# Patient Record
Sex: MALE | Race: ASIAN | NOT HISPANIC OR LATINO | Employment: FULL TIME | URBAN - METROPOLITAN AREA
[De-identification: names, ages, dates, MRNs, and addresses within clinical notes are randomized per-mention and may not be internally consistent; named-entity substitution may affect disease eponyms.]

---

## 2019-02-21 ENCOUNTER — OFFICE VISIT (OUTPATIENT)
Dept: INTERNAL MEDICINE | Facility: CLINIC | Age: 54
End: 2019-02-21
Payer: COMMERCIAL

## 2019-02-21 VITALS
DIASTOLIC BLOOD PRESSURE: 73 MMHG | HEIGHT: 66 IN | WEIGHT: 177.94 LBS | BODY MASS INDEX: 28.6 KG/M2 | HEART RATE: 86 BPM | SYSTOLIC BLOOD PRESSURE: 107 MMHG | TEMPERATURE: 98 F

## 2019-02-21 DIAGNOSIS — Z12.5 PROSTATE CANCER SCREENING: ICD-10-CM

## 2019-02-21 DIAGNOSIS — L40.9 PSORIASIS: ICD-10-CM

## 2019-02-21 DIAGNOSIS — Z23 NEED FOR 23-POLYVALENT PNEUMOCOCCAL POLYSACCHARIDE VACCINE: ICD-10-CM

## 2019-02-21 DIAGNOSIS — Z12.11 COLON CANCER SCREENING: ICD-10-CM

## 2019-02-21 DIAGNOSIS — Z11.59 NEED FOR HEPATITIS C SCREENING TEST: ICD-10-CM

## 2019-02-21 DIAGNOSIS — Z23 NEED FOR IMMUNIZATION AGAINST VACCINIA VIRUS INFECTION: ICD-10-CM

## 2019-02-21 DIAGNOSIS — Z00.00 WELL ADULT EXAM: Primary | ICD-10-CM

## 2019-02-21 DIAGNOSIS — F17.200 SMOKER: ICD-10-CM

## 2019-02-21 DIAGNOSIS — Z83.3 FAMILY HISTORY OF DIABETES MELLITUS: ICD-10-CM

## 2019-02-21 PROCEDURE — 90732 PNEUMOCOCCAL POLYSACCHARIDE VACCINE 23-VALENT =>2YO SQ IM: ICD-10-PCS | Mod: S$GLB,,, | Performed by: FAMILY MEDICINE

## 2019-02-21 PROCEDURE — 99999 PR PBB SHADOW E&M-NEW PATIENT-LVL III: CPT | Mod: PBBFAC,,, | Performed by: FAMILY MEDICINE

## 2019-02-21 PROCEDURE — 90686 FLU VACCINE (QUAD) GREATER THAN OR EQUAL TO 3YO PRESERVATIVE FREE IM: ICD-10-PCS | Mod: S$GLB,,, | Performed by: FAMILY MEDICINE

## 2019-02-21 PROCEDURE — 90686 IIV4 VACC NO PRSV 0.5 ML IM: CPT | Mod: S$GLB,,, | Performed by: FAMILY MEDICINE

## 2019-02-21 PROCEDURE — 90472 IMMUNIZATION ADMIN EACH ADD: CPT | Mod: S$GLB,,, | Performed by: FAMILY MEDICINE

## 2019-02-21 PROCEDURE — 99386 PREV VISIT NEW AGE 40-64: CPT | Mod: 25,S$GLB,, | Performed by: FAMILY MEDICINE

## 2019-02-21 PROCEDURE — 90471 IMMUNIZATION ADMIN: CPT | Mod: S$GLB,,, | Performed by: FAMILY MEDICINE

## 2019-02-21 PROCEDURE — 99999 PR PBB SHADOW E&M-NEW PATIENT-LVL III: ICD-10-PCS | Mod: PBBFAC,,, | Performed by: FAMILY MEDICINE

## 2019-02-21 PROCEDURE — 90472 PNEUMOCOCCAL POLYSACCHARIDE VACCINE 23-VALENT =>2YO SQ IM: ICD-10-PCS | Mod: S$GLB,,, | Performed by: FAMILY MEDICINE

## 2019-02-21 PROCEDURE — 99386 PR PREVENTIVE VISIT,NEW,40-64: ICD-10-PCS | Mod: 25,S$GLB,, | Performed by: FAMILY MEDICINE

## 2019-02-21 PROCEDURE — 90471 FLU VACCINE (QUAD) GREATER THAN OR EQUAL TO 3YO PRESERVATIVE FREE IM: ICD-10-PCS | Mod: S$GLB,,, | Performed by: FAMILY MEDICINE

## 2019-02-21 PROCEDURE — 90732 PPSV23 VACC 2 YRS+ SUBQ/IM: CPT | Mod: S$GLB,,, | Performed by: FAMILY MEDICINE

## 2019-02-21 RX ORDER — IBUPROFEN 800 MG/1
TABLET ORAL
COMMUNITY
Start: 2019-02-20 | End: 2022-06-10

## 2019-02-21 RX ORDER — AMOXICILLIN 500 MG/1
TABLET, FILM COATED ORAL
COMMUNITY
Start: 2019-02-20 | End: 2020-04-08

## 2019-02-21 RX ORDER — HYDROCODONE BITARTRATE AND ACETAMINOPHEN 7.5; 325 MG/1; MG/1
TABLET ORAL
COMMUNITY
Start: 2019-02-20 | End: 2020-04-08

## 2019-02-21 NOTE — PROGRESS NOTES
Subjective:       Patient ID: Jerry Turner is a 53 y.o. male.    Chief Complaint: Annual Exam and Establish Care    HPI 53-year-old German male presents to clinic today to establish care.  He has been followed by Dermatology in the past but has been inconsistent with follow-up for treatment of psoriasis.  At this time he reports using over-the-counter moisturizers without relief.  He reports a past surgical history of dental bone graft.  He reports a family history of his mother having breast cancer and diabetes.  He is not up-to-date with pneumonia vaccine or flu vaccine.  Both have been discussed and will be given today.  Colonoscopy has been discussed and will be scheduled.  Review of Systems   Constitutional: Negative for appetite change, chills, fatigue and fever.   HENT: Negative for congestion, ear pain, hearing loss, postnasal drip, rhinorrhea, sinus pressure, sore throat and tinnitus.    Eyes: Negative for redness, itching and visual disturbance.   Respiratory: Negative for cough, chest tightness and shortness of breath.    Cardiovascular: Negative for chest pain and palpitations.   Gastrointestinal: Negative for abdominal pain, constipation, diarrhea, nausea and vomiting.   Genitourinary: Negative for decreased urine volume, difficulty urinating, dysuria, frequency, hematuria and urgency.   Musculoskeletal: Negative for back pain, myalgias, neck pain and neck stiffness.   Skin: Negative for rash.   Neurological: Negative for dizziness, light-headedness and headaches.   Psychiatric/Behavioral: Negative.        Objective:      Physical Exam   Constitutional: He is oriented to person, place, and time. He appears well-developed and well-nourished. No distress.   HENT:   Head: Normocephalic and atraumatic.   Right Ear: External ear normal.   Left Ear: External ear normal.   Nose: Nose normal.   Mouth/Throat: Oropharynx is clear and moist. No oropharyngeal exudate.   Eyes: Conjunctivae and EOM are normal.  Pupils are equal, round, and reactive to light. Right eye exhibits no discharge. Left eye exhibits no discharge. No scleral icterus.   Neck: Normal range of motion. Neck supple. No JVD present. No tracheal deviation present. No thyromegaly present.   Cardiovascular: Normal rate, regular rhythm, normal heart sounds and intact distal pulses. Exam reveals no gallop and no friction rub.   No murmur heard.  Pulmonary/Chest: Effort normal and breath sounds normal. No stridor. No respiratory distress. He has no wheezes. He has no rales.   Abdominal: Soft. Bowel sounds are normal. He exhibits no distension and no mass. There is no tenderness. There is no rebound and no guarding.   Musculoskeletal: Normal range of motion. He exhibits no edema or tenderness.   Lymphadenopathy:     He has no cervical adenopathy.   Neurological: He is alert and oriented to person, place, and time.   Skin: Skin is warm and dry. No rash noted. He is not diaphoretic. No erythema. No pallor.   Psoriatic plaques to the bilateral arms   Psychiatric: He has a normal mood and affect. His behavior is normal. Judgment and thought content normal.   Nursing note and vitals reviewed.      Assessment:       1. Well adult exam    2. Psoriasis    3. Family history of diabetes mellitus    4. Need for immunization against vaccinia virus infection    5. Need for 23-polyvalent pneumococcal polysaccharide vaccine    6. Colon cancer screening    7. Need for hepatitis C screening test    8. Prostate cancer screening    9. Smoker        Plan:     1.  CBC, CMP, UA, TSH, free T4, fasting lipids, vitamin-D level, hemoglobin A1c, PSA, and hepatitis-C antibody screen.  2.  Refer to Dermatology for further evaluation and treatment of psoriasis.  3.  Pneumococcal vaccine given.  4.  Flu vaccine given.  5.  Encourage smoking cessation.  6.  Return to clinic as needed or in 1 year for annual exam.

## 2019-02-26 ENCOUNTER — LAB VISIT (OUTPATIENT)
Dept: LAB | Facility: HOSPITAL | Age: 54
End: 2019-02-26
Attending: FAMILY MEDICINE
Payer: COMMERCIAL

## 2019-02-26 DIAGNOSIS — Z00.00 WELL ADULT EXAM: ICD-10-CM

## 2019-02-26 DIAGNOSIS — Z11.59 NEED FOR HEPATITIS C SCREENING TEST: ICD-10-CM

## 2019-02-26 DIAGNOSIS — Z12.5 PROSTATE CANCER SCREENING: ICD-10-CM

## 2019-02-26 LAB
25(OH)D3+25(OH)D2 SERPL-MCNC: 22 NG/ML
ALBUMIN SERPL BCP-MCNC: 3.8 G/DL
ALP SERPL-CCNC: 58 U/L
ALT SERPL W/O P-5'-P-CCNC: 21 U/L
ANION GAP SERPL CALC-SCNC: 7 MMOL/L
AST SERPL-CCNC: 23 U/L
BASOPHILS # BLD AUTO: 0.06 K/UL
BASOPHILS NFR BLD: 1 %
BILIRUB SERPL-MCNC: 0.5 MG/DL
BUN SERPL-MCNC: 16 MG/DL
CALCIUM SERPL-MCNC: 9.3 MG/DL
CHLORIDE SERPL-SCNC: 104 MMOL/L
CHOLEST SERPL-MCNC: 204 MG/DL
CHOLEST/HDLC SERPL: 4.5 {RATIO}
CO2 SERPL-SCNC: 29 MMOL/L
COMPLEXED PSA SERPL-MCNC: 0.5 NG/ML
CREAT SERPL-MCNC: 1 MG/DL
DIFFERENTIAL METHOD: ABNORMAL
EOSINOPHIL # BLD AUTO: 0.5 K/UL
EOSINOPHIL NFR BLD: 7.8 %
ERYTHROCYTE [DISTWIDTH] IN BLOOD BY AUTOMATED COUNT: 13.2 %
EST. GFR  (AFRICAN AMERICAN): >60 ML/MIN/1.73 M^2
EST. GFR  (NON AFRICAN AMERICAN): >60 ML/MIN/1.73 M^2
ESTIMATED AVG GLUCOSE: 123 MG/DL
GLUCOSE SERPL-MCNC: 103 MG/DL
HBA1C MFR BLD HPLC: 5.9 %
HCT VFR BLD AUTO: 42.5 %
HCV AB SERPL QL IA: NEGATIVE
HDLC SERPL-MCNC: 45 MG/DL
HDLC SERPL: 22.1 %
HGB BLD-MCNC: 13.8 G/DL
IMM GRANULOCYTES # BLD AUTO: 0.03 K/UL
IMM GRANULOCYTES NFR BLD AUTO: 0.5 %
LDLC SERPL CALC-MCNC: 128 MG/DL
LYMPHOCYTES # BLD AUTO: 2.1 K/UL
LYMPHOCYTES NFR BLD: 33.7 %
MCH RBC QN AUTO: 32.5 PG
MCHC RBC AUTO-ENTMCNC: 32.5 G/DL
MCV RBC AUTO: 100 FL
MONOCYTES # BLD AUTO: 0.6 K/UL
MONOCYTES NFR BLD: 10 %
NEUTROPHILS # BLD AUTO: 3 K/UL
NEUTROPHILS NFR BLD: 47 %
NONHDLC SERPL-MCNC: 159 MG/DL
NRBC BLD-RTO: 0 /100 WBC
PLATELET # BLD AUTO: 242 K/UL
PMV BLD AUTO: 11.1 FL
POTASSIUM SERPL-SCNC: 4.5 MMOL/L
PROT SERPL-MCNC: 6.5 G/DL
RBC # BLD AUTO: 4.25 M/UL
SODIUM SERPL-SCNC: 140 MMOL/L
T4 FREE SERPL-MCNC: 1.08 NG/DL
TRIGL SERPL-MCNC: 155 MG/DL
TSH SERPL DL<=0.005 MIU/L-ACNC: 2.03 UIU/ML
WBC # BLD AUTO: 6.3 K/UL

## 2019-02-26 PROCEDURE — 84443 ASSAY THYROID STIM HORMONE: CPT

## 2019-02-26 PROCEDURE — 80053 COMPREHEN METABOLIC PANEL: CPT

## 2019-02-26 PROCEDURE — 86803 HEPATITIS C AB TEST: CPT

## 2019-02-26 PROCEDURE — 80061 LIPID PANEL: CPT

## 2019-02-26 PROCEDURE — 36415 COLL VENOUS BLD VENIPUNCTURE: CPT | Mod: PO

## 2019-02-26 PROCEDURE — 84439 ASSAY OF FREE THYROXINE: CPT

## 2019-02-26 PROCEDURE — 85025 COMPLETE CBC W/AUTO DIFF WBC: CPT

## 2019-02-26 PROCEDURE — 83036 HEMOGLOBIN GLYCOSYLATED A1C: CPT

## 2019-02-26 PROCEDURE — 82306 VITAMIN D 25 HYDROXY: CPT

## 2019-02-26 PROCEDURE — 84153 ASSAY OF PSA TOTAL: CPT

## 2019-02-27 ENCOUNTER — TELEPHONE (OUTPATIENT)
Dept: INTERNAL MEDICINE | Facility: CLINIC | Age: 54
End: 2019-02-27

## 2019-02-28 ENCOUNTER — LAB VISIT (OUTPATIENT)
Dept: LAB | Facility: HOSPITAL | Age: 54
End: 2019-02-28
Attending: DERMATOLOGY
Payer: COMMERCIAL

## 2019-02-28 ENCOUNTER — INITIAL CONSULT (OUTPATIENT)
Dept: DERMATOLOGY | Facility: CLINIC | Age: 54
End: 2019-02-28
Payer: COMMERCIAL

## 2019-02-28 DIAGNOSIS — D17.21 BENIGN LIPOMATOUS NEOPLASM OF SKIN, SUBCU OF RIGHT ARM: ICD-10-CM

## 2019-02-28 DIAGNOSIS — L40.9 PSORIASIS: Primary | ICD-10-CM

## 2019-02-28 DIAGNOSIS — L40.9 PSORIASIS: ICD-10-CM

## 2019-02-28 DIAGNOSIS — L81.4 LENTIGO: ICD-10-CM

## 2019-02-28 PROCEDURE — 99203 OFFICE O/P NEW LOW 30 MIN: CPT | Mod: S$GLB,,, | Performed by: DERMATOLOGY

## 2019-02-28 PROCEDURE — 36415 COLL VENOUS BLD VENIPUNCTURE: CPT

## 2019-02-28 PROCEDURE — 87340 HEPATITIS B SURFACE AG IA: CPT

## 2019-02-28 PROCEDURE — 99999 PR PBB SHADOW E&M-EST. PATIENT-LVL II: ICD-10-PCS | Mod: PBBFAC,,, | Performed by: DERMATOLOGY

## 2019-02-28 PROCEDURE — 99999 PR PBB SHADOW E&M-EST. PATIENT-LVL II: CPT | Mod: PBBFAC,,, | Performed by: DERMATOLOGY

## 2019-02-28 PROCEDURE — 86706 HEP B SURFACE ANTIBODY: CPT

## 2019-02-28 PROCEDURE — 99203 PR OFFICE/OUTPT VISIT, NEW, LEVL III, 30-44 MIN: ICD-10-PCS | Mod: S$GLB,,, | Performed by: DERMATOLOGY

## 2019-02-28 NOTE — LETTER
February 28, 2019      Robert Babin MD  [address] [facility] [place] [address]           [facility]  [address]  [place] [address]  Phone: [phone]  Fax: [phone]          Patient: Jerry Turner   MR Number: [id number]   YOB: 1965   Date of Visit: 2/28/2019       Dear Dr. Robert Babin:    Thank you for referring Jerry Turner to me for evaluation. Attached you will find relevant portions of my assessment and plan of care.    If you have questions, please do not hesitate to call me. I look forward to following Jerry Turner along with you.    Sincerely,    Krystal Guerra MD    Enclosure  CC:  No Recipients    If you would like to receive this communication electronically, please contact [email or web address] or [phone] to request more information on [employer] Link access.    For providers and/or their staff who would like to refer a patient to [facility], please contact us through our one-stop-shop provider referral line, [facility], at [phone].    If you feel you have received this communication in error or would no longer like to receive these types of communications, please e-mail [email or web address]

## 2019-02-28 NOTE — PROGRESS NOTES
"  Subjective:       Patient ID:  Jerry Turner is a 53 y.o. male who presents for   Chief Complaint   Patient presents with    Psoriasis     Psoriasis  - Initial  Affected locations: diffuse  Duration: 10 years  Signs / symptoms: itching  Severity: moderate to severe  Timing: constant  Aggravated by: nothing  Treatments tried: seen by derm in [place] last year -- treated with "light tx" and pills in a pack. also used TAC cream/oint.  Improvement on treatment: no relief        Review of Systems   Constitutional: Negative for fever.   HENT: Negative for sore throat.    Gastrointestinal: Negative for diarrhea.   Musculoskeletal: Positive for arthralgias (fingers/hands).   Skin: Positive for itching and rash.        Objective:    Physical Exam   Constitutional: He appears well-developed and well-nourished. No distress.   Genitourinary:         Neurological: He is alert and oriented to person, place, and time. He is not disoriented.   Psychiatric: He has a normal mood and affect.   Skin:   Areas Examined (abnormalities noted in diagram):   Scalp / Hair Palpated and Inspected  Head / Face Inspection Performed  Neck Inspection Performed  Chest / Axilla Inspection Performed  Abdomen Inspection Performed  Genitals / Buttocks / Groin Inspection Performed  Back Inspection Performed  RUE Inspected  LUE Inspection Performed  RLE Inspected  LLE Inspection Performed  Nails and Digits Inspection Performed              Diagram Legend     Erythematous scaling macule/papule c/w actinic keratosis       Vascular papule c/w angioma      Pigmented verrucoid papule/plaque c/w seborrheic keratosis      Yellow umbilicated papule c/w sebaceous hyperplasia      Irregularly shaped tan macule c/w lentigo     1-2 mm smooth white papules consistent with Milia      Movable subcutaneous cyst with punctum c/w epidermal inclusion cyst      Subcutaneous movable cyst c/w pilar cyst      Firm pink to brown papule c/w dermatofibroma      Pedunculated fleshy " papule(s) c/w skin tag(s)      Evenly pigmented macule c/w junctional nevus     Mildly variegated pigmented, slightly irregular-bordered macule c/w mildly atypical nevus      Flesh colored to evenly pigmented papule c/w intradermal nevus       Pink pearly papule/plaque c/w basal cell carcinoma      Erythematous hyperkeratotic cursted plaque c/w SCC      Surgical scar with no sign of skin cancer recurrence      Open and closed comedones      Inflammatory papules and pustules      Verrucoid papule consistent consistent with wart     Erythematous eczematous patches and plaques     Dystrophic onycholytic nail with subungual debris c/w onychomycosis     Umbilicated papule    Erythematous-base heme-crusted tan verrucoid plaque consistent with inflamed seborrheic keratosis     Erythematous Silvery Scaling Plaque c/w Psoriasis     See annotation    Lab Results   Component Value Date    WBC 6.30 02/26/2019    HGB 13.8 (L) 02/26/2019    HCT 42.5 02/26/2019     (H) 02/26/2019     02/26/2019     CMP  Sodium   Date Value Ref Range Status   02/26/2019 140 136 - 145 mmol/L Final     Potassium   Date Value Ref Range Status   02/26/2019 4.5 3.5 - 5.1 mmol/L Final     Chloride   Date Value Ref Range Status   02/26/2019 104 95 - 110 mmol/L Final     CO2   Date Value Ref Range Status   02/26/2019 29 23 - 29 mmol/L Final     Glucose   Date Value Ref Range Status   02/26/2019 103 70 - 110 mg/dL Final     BUN, Bld   Date Value Ref Range Status   02/26/2019 16 6 - 20 mg/dL Final     Creatinine   Date Value Ref Range Status   02/26/2019 1.0 0.5 - 1.4 mg/dL Final     Calcium   Date Value Ref Range Status   02/26/2019 9.3 8.7 - 10.5 mg/dL Final     Total Protein   Date Value Ref Range Status   02/26/2019 6.5 6.0 - 8.4 g/dL Final     Albumin   Date Value Ref Range Status   02/26/2019 3.8 3.5 - 5.2 g/dL Final     Total Bilirubin   Date Value Ref Range Status   02/26/2019 0.5 0.1 - 1.0 mg/dL Final     Comment:     For infants and  newborns, interpretation of results should be based  on gestational age, weight and in agreement with clinical  observations.  Premature Infant recommended reference ranges:  Up to 24 hours.............<8.0 mg/dL  Up to 48 hours............<12.0 mg/dL  3-5 days..................<15.0 mg/dL  6-29 days.................<15.0 mg/dL       Alkaline Phosphatase   Date Value Ref Range Status   02/26/2019 58 55 - 135 U/L Final     AST   Date Value Ref Range Status   02/26/2019 23 10 - 40 U/L Final     ALT   Date Value Ref Range Status   02/26/2019 21 10 - 44 U/L Final     Anion Gap   Date Value Ref Range Status   02/26/2019 7 (L) 8 - 16 mmol/L Final     eGFR if    Date Value Ref Range Status   02/26/2019 >60.0 >60 mL/min/1.73 m^2 Final     eGFR if non    Date Value Ref Range Status   02/26/2019 >60.0 >60 mL/min/1.73 m^2 Final     Comment:     Calculation used to obtain the estimated glomerular filtration  rate (eGFR) is the CKD-EPI equation.        Lab Results   Component Value Date    HEPCAB Negative 02/26/2019       Assessment / Plan:        Psoriasis  15% BSA including scrotum  Discussed  benefits and risks of Taltz including but not limited to injection site reactions, increased risk of infection beverley candida/tinea, and decreased tumor surveillance. Patient informed to discontinue Taltz and notify our office if an infection develops.  Patient counseled to avoid live vaccines.  Need baseline quant gold and Hep B  Start Taltz at 160mg SQ on day 1 then 80mg SQ day 14 and qoweek to week 12 (# 8 vials total) then 80mg SQ qmonth    Will need CBC q 3 - 6 months. Will need Quantiferon gold annually.      Lentigo  This is a benign hyperpigmented sun induced lesion. Daily sun protection will reduce the number of new lesions. Treatment of these benign lesions are considered cosmetic.      Benign lipomatous neoplasm of skin, subcu of right arm  Reassurance.              Follow-up if symptoms worsen or  fail to improve.

## 2019-03-01 LAB
HBV SURFACE AB SER-ACNC: POSITIVE M[IU]/ML
HBV SURFACE AG SERPL QL IA: NEGATIVE

## 2019-03-26 ENCOUNTER — OFFICE VISIT (OUTPATIENT)
Dept: INFECTIOUS DISEASES | Facility: CLINIC | Age: 54
End: 2019-03-26
Payer: COMMERCIAL

## 2019-03-26 ENCOUNTER — CLINICAL SUPPORT (OUTPATIENT)
Dept: INFECTIOUS DISEASES | Facility: CLINIC | Age: 54
End: 2019-03-26
Payer: COMMERCIAL

## 2019-03-26 ENCOUNTER — HOSPITAL ENCOUNTER (OUTPATIENT)
Dept: RADIOLOGY | Facility: HOSPITAL | Age: 54
Discharge: HOME | End: 2019-03-26
Attending: INTERNAL MEDICINE
Payer: COMMERCIAL

## 2019-03-26 VITALS
WEIGHT: 171.31 LBS | BODY MASS INDEX: 27.53 KG/M2 | DIASTOLIC BLOOD PRESSURE: 85 MMHG | SYSTOLIC BLOOD PRESSURE: 137 MMHG | HEART RATE: 76 BPM | HEIGHT: 66 IN | TEMPERATURE: 98 F

## 2019-03-26 DIAGNOSIS — Z23 NEED FOR DIPHTHERIA-TETANUS-PERTUSSIS (TDAP) VACCINE: ICD-10-CM

## 2019-03-26 DIAGNOSIS — D84.9 IMMUNOSUPPRESSION: ICD-10-CM

## 2019-03-26 DIAGNOSIS — R76.12 POSITIVE QUANTIFERON-TB GOLD TEST: Primary | ICD-10-CM

## 2019-03-26 DIAGNOSIS — R76.12 POSITIVE QUANTIFERON-TB GOLD TEST: ICD-10-CM

## 2019-03-26 DIAGNOSIS — L40.9 PSORIASIS: ICD-10-CM

## 2019-03-26 DIAGNOSIS — Z71.85 VACCINE COUNSELING: ICD-10-CM

## 2019-03-26 PROCEDURE — 90715 TDAP VACCINE GREATER THAN OR EQUAL TO 7YO IM: ICD-10-PCS | Mod: S$GLB,,, | Performed by: INTERNAL MEDICINE

## 2019-03-26 PROCEDURE — 90715 TDAP VACCINE 7 YRS/> IM: CPT | Mod: S$GLB,,, | Performed by: INTERNAL MEDICINE

## 2019-03-26 PROCEDURE — 90471 TDAP VACCINE GREATER THAN OR EQUAL TO 7YO IM: ICD-10-PCS | Mod: S$GLB,,, | Performed by: INTERNAL MEDICINE

## 2019-03-26 PROCEDURE — 71046 X-RAY EXAM CHEST 2 VIEWS: CPT | Mod: 26,,, | Performed by: RADIOLOGY

## 2019-03-26 PROCEDURE — 90471 IMMUNIZATION ADMIN: CPT | Mod: S$GLB,,, | Performed by: INTERNAL MEDICINE

## 2019-03-26 PROCEDURE — 3008F PR BODY MASS INDEX (BMI) DOCUMENTED: ICD-10-PCS | Mod: CPTII,S$GLB,, | Performed by: INTERNAL MEDICINE

## 2019-03-26 PROCEDURE — 71046 XR CHEST PA AND LATERAL: ICD-10-PCS | Mod: 26,,, | Performed by: RADIOLOGY

## 2019-03-26 PROCEDURE — 71046 X-RAY EXAM CHEST 2 VIEWS: CPT | Mod: TC

## 2019-03-26 PROCEDURE — 99999 PR PBB SHADOW E&M-EST. PATIENT-LVL III: ICD-10-PCS | Mod: PBBFAC,,, | Performed by: INTERNAL MEDICINE

## 2019-03-26 PROCEDURE — 99204 OFFICE O/P NEW MOD 45 MIN: CPT | Mod: S$GLB,,, | Performed by: INTERNAL MEDICINE

## 2019-03-26 PROCEDURE — 99999 PR PBB SHADOW E&M-EST. PATIENT-LVL III: CPT | Mod: PBBFAC,,, | Performed by: INTERNAL MEDICINE

## 2019-03-26 PROCEDURE — 3008F BODY MASS INDEX DOCD: CPT | Mod: CPTII,S$GLB,, | Performed by: INTERNAL MEDICINE

## 2019-03-26 PROCEDURE — 99204 PR OFFICE/OUTPT VISIT, NEW, LEVL IV, 45-59 MIN: ICD-10-PCS | Mod: S$GLB,,, | Performed by: INTERNAL MEDICINE

## 2019-03-26 RX ORDER — RIFAMPIN 300 MG/1
600 CAPSULE ORAL DAILY
Qty: 60 CAPSULE | Refills: 3 | Status: SHIPPED | OUTPATIENT
Start: 2019-03-26 | End: 2020-04-08

## 2019-03-26 NOTE — LETTER
March 26, 2019      Krystal Guerra MD  [address]  [facility] [address]           [facility]  [address]  [facility] [address]  Phone: [phone]  Fax: [phone]          Patient: Jerry Turner   MR Number: [id number]   YOB: 1965   Date of Visit: 3/26/2019       Dear Dr. Krystal Guerra:    Thank you for referring Jerry Turner to me for evaluation. Attached you will find relevant portions of my assessment and plan of care.    If you have questions, please do not hesitate to call me. I look forward to following Jerry Turner along with you.    Sincerely,    Radha Chilel MD    Enclosure  CC:  No Recipients    If you would like to receive this communication electronically, please contact [email or web address] or [phone] to request more information on [employer] Link access.    For providers and/or their staff who would like to refer a patient to [facility], please contact us through our one-stop-shop provider referral line, [facility], at [phone].    If you feel you have received this communication in error or would no longer like to receive these types of communications, please e-mail [email or web address]

## 2019-03-27 ENCOUNTER — TELEPHONE (OUTPATIENT)
Dept: INFECTIOUS DISEASES | Facility: CLINIC | Age: 54
End: 2019-03-27

## 2019-03-27 NOTE — TELEPHONE ENCOUNTER
----- Message from Radha Chilel MD sent at 3/26/2019  5:41 PM CDT -----  Can you please call him to schedule labs - I put in HIV, RPR, FTA and HepB. Thanks

## 2019-03-27 NOTE — TELEPHONE ENCOUNTER
----- Message from Radha Chilel MD sent at 3/26/2019  5:01 PM CDT -----  Please let him know his Chest Xray was normal.

## 2019-04-29 ENCOUNTER — LAB VISIT (OUTPATIENT)
Dept: LAB | Facility: HOSPITAL | Age: 54
End: 2019-04-29
Attending: INTERNAL MEDICINE
Payer: COMMERCIAL

## 2019-04-29 ENCOUNTER — OFFICE VISIT (OUTPATIENT)
Dept: INFECTIOUS DISEASES | Facility: CLINIC | Age: 54
End: 2019-04-29
Payer: COMMERCIAL

## 2019-04-29 VITALS
WEIGHT: 174.38 LBS | HEART RATE: 87 BPM | HEIGHT: 66 IN | TEMPERATURE: 98 F | SYSTOLIC BLOOD PRESSURE: 138 MMHG | BODY MASS INDEX: 28.03 KG/M2 | DIASTOLIC BLOOD PRESSURE: 89 MMHG

## 2019-04-29 DIAGNOSIS — L40.9 PSORIASIS: ICD-10-CM

## 2019-04-29 DIAGNOSIS — D84.9 IMMUNOSUPPRESSION: ICD-10-CM

## 2019-04-29 DIAGNOSIS — Z79.2 LONG TERM (CURRENT) USE OF ANTIBIOTICS: ICD-10-CM

## 2019-04-29 DIAGNOSIS — R76.12 POSITIVE QUANTIFERON-TB GOLD TEST: Primary | ICD-10-CM

## 2019-04-29 DIAGNOSIS — R76.12 POSITIVE QUANTIFERON-TB GOLD TEST: ICD-10-CM

## 2019-04-29 LAB
ALBUMIN SERPL BCP-MCNC: 3.9 G/DL (ref 3.5–5.2)
ALP SERPL-CCNC: 67 U/L (ref 55–135)
ALT SERPL W/O P-5'-P-CCNC: 41 U/L (ref 10–44)
ANION GAP SERPL CALC-SCNC: 8 MMOL/L (ref 8–16)
AST SERPL-CCNC: 24 U/L (ref 10–40)
BASOPHILS # BLD AUTO: 0.03 K/UL (ref 0–0.2)
BASOPHILS NFR BLD: 0.4 % (ref 0–1.9)
BILIRUB SERPL-MCNC: 0.3 MG/DL (ref 0.1–1)
BUN SERPL-MCNC: 12 MG/DL (ref 6–20)
CALCIUM SERPL-MCNC: 9.5 MG/DL (ref 8.7–10.5)
CHLORIDE SERPL-SCNC: 110 MMOL/L (ref 95–110)
CO2 SERPL-SCNC: 24 MMOL/L (ref 23–29)
CREAT SERPL-MCNC: 0.9 MG/DL (ref 0.5–1.4)
DIFFERENTIAL METHOD: ABNORMAL
EOSINOPHIL # BLD AUTO: 0.2 K/UL (ref 0–0.5)
EOSINOPHIL NFR BLD: 3.1 % (ref 0–8)
ERYTHROCYTE [DISTWIDTH] IN BLOOD BY AUTOMATED COUNT: 12.9 % (ref 11.5–14.5)
EST. GFR  (AFRICAN AMERICAN): >60 ML/MIN/1.73 M^2
EST. GFR  (NON AFRICAN AMERICAN): >60 ML/MIN/1.73 M^2
GLUCOSE SERPL-MCNC: 136 MG/DL (ref 70–110)
HCT VFR BLD AUTO: 40.5 % (ref 40–54)
HGB BLD-MCNC: 13.4 G/DL (ref 14–18)
IMM GRANULOCYTES # BLD AUTO: 0.02 K/UL (ref 0–0.04)
IMM GRANULOCYTES NFR BLD AUTO: 0.3 % (ref 0–0.5)
LYMPHOCYTES # BLD AUTO: 1.8 K/UL (ref 1–4.8)
LYMPHOCYTES NFR BLD: 26 % (ref 18–48)
MCH RBC QN AUTO: 32.1 PG (ref 27–31)
MCHC RBC AUTO-ENTMCNC: 33.1 G/DL (ref 32–36)
MCV RBC AUTO: 97 FL (ref 82–98)
MONOCYTES # BLD AUTO: 0.8 K/UL (ref 0.3–1)
MONOCYTES NFR BLD: 10.7 % (ref 4–15)
NEUTROPHILS # BLD AUTO: 4.2 K/UL (ref 1.8–7.7)
NEUTROPHILS NFR BLD: 59.5 % (ref 38–73)
NRBC BLD-RTO: 0 /100 WBC
PLATELET # BLD AUTO: 225 K/UL (ref 150–350)
PMV BLD AUTO: 10.2 FL (ref 9.2–12.9)
POTASSIUM SERPL-SCNC: 3.7 MMOL/L (ref 3.5–5.1)
PROT SERPL-MCNC: 7 G/DL (ref 6–8.4)
RBC # BLD AUTO: 4.17 M/UL (ref 4.6–6.2)
SODIUM SERPL-SCNC: 142 MMOL/L (ref 136–145)
WBC # BLD AUTO: 7.07 K/UL (ref 3.9–12.7)

## 2019-04-29 PROCEDURE — 99999 PR PBB SHADOW E&M-EST. PATIENT-LVL III: CPT | Mod: PBBFAC,,, | Performed by: INTERNAL MEDICINE

## 2019-04-29 PROCEDURE — 80053 COMPREHEN METABOLIC PANEL: CPT

## 2019-04-29 PROCEDURE — 99999 PR PBB SHADOW E&M-EST. PATIENT-LVL III: ICD-10-PCS | Mod: PBBFAC,,, | Performed by: INTERNAL MEDICINE

## 2019-04-29 PROCEDURE — 86592 SYPHILIS TEST NON-TREP QUAL: CPT

## 2019-04-29 PROCEDURE — 86704 HEP B CORE ANTIBODY TOTAL: CPT

## 2019-04-29 PROCEDURE — 36415 COLL VENOUS BLD VENIPUNCTURE: CPT

## 2019-04-29 PROCEDURE — 3008F BODY MASS INDEX DOCD: CPT | Mod: CPTII,S$GLB,, | Performed by: INTERNAL MEDICINE

## 2019-04-29 PROCEDURE — 86703 HIV-1/HIV-2 1 RESULT ANTBDY: CPT

## 2019-04-29 PROCEDURE — 3008F PR BODY MASS INDEX (BMI) DOCUMENTED: ICD-10-PCS | Mod: CPTII,S$GLB,, | Performed by: INTERNAL MEDICINE

## 2019-04-29 PROCEDURE — 99213 PR OFFICE/OUTPT VISIT, EST, LEVL III, 20-29 MIN: ICD-10-PCS | Mod: S$GLB,,, | Performed by: INTERNAL MEDICINE

## 2019-04-29 PROCEDURE — 85025 COMPLETE CBC W/AUTO DIFF WBC: CPT

## 2019-04-29 PROCEDURE — 99213 OFFICE O/P EST LOW 20 MIN: CPT | Mod: S$GLB,,, | Performed by: INTERNAL MEDICINE

## 2019-04-29 NOTE — PROGRESS NOTES
Subjective:      Patient ID: Jerry Turner is a 53 y.o. male.    Chief Complaint: Positive quantiferon gold    History of Present Illness  53-year-old male with history of Psoriasis presents with positive quantiferon gold.      Summary of illness:  Patient seen by Dermatology for Psoriasis, plans to start ixeuzumab.  Pre-treatment labs notable for positive quantiferon gold and HepBsAb.  [place] moved to [place] from [place] in [place].  Patient works as an .  He is not aware of being exposed to anyone with TB in the past.  He denied any fevers, chills, SOB, CP, n/v/d, abdominal pain, night sweats, weight loss.  He does drink 1-2 beers per night, sometimes 6 per evening on the weekends.    Subjective:  Patient reports having pain in his shoulder that migrated to his leg, down to his ankle.  Reports never had this sensation before.  Denied any fevers, chills, sweats, n/v/d, abdominal pain, cough, SOB, CP.  Notes his psoriasis appears to be improving, still with salmon plaque but no overlying scale.    Review of Systems   Constitution: Positive for malaise/fatigue. Negative for chills, decreased appetite, fever, night sweats, weight gain and weight loss.   HENT: Negative for congestion, ear pain, hearing loss, hoarse voice, sore throat and tinnitus.    Eyes: Negative for blurred vision, redness and visual disturbance.   Cardiovascular: Negative for chest pain, leg swelling and palpitations.   Respiratory: Negative for cough, hemoptysis, shortness of breath and sputum production.    Hematologic/Lymphatic: Negative for adenopathy. Does not bruise/bleed easily.   Skin: Negative for dry skin, itching, rash and suspicious lesions.   Musculoskeletal: Positive for back pain, joint pain and myalgias. Negative for neck pain.   Gastrointestinal: Negative for abdominal pain, constipation, diarrhea, heartburn, nausea and vomiting.   Genitourinary: Negative for dysuria, flank pain, frequency, hematuria, hesitancy and urgency.    Neurological: Negative for dizziness, headaches, numbness, paresthesias and weakness.   Psychiatric/Behavioral: Negative for depression and memory loss. The patient does not have insomnia and is not nervous/anxious.      Objective:   Physical Exam   Constitutional: He is oriented to person, place, and time. He appears well-developed and well-nourished. No distress.   HENT:   Head: Normocephalic and atraumatic.   Eyes: Conjunctivae and EOM are normal.   Neck: Normal range of motion. Neck supple.   Pulmonary/Chest: Effort normal. No respiratory distress.   Abdominal: Soft. He exhibits no distension.   Musculoskeletal: Normal range of motion. He exhibits no edema.   Left heel with no redness, swelling, deformity   Neurological: He is alert and oriented to person, place, and time.   Skin: Skin is warm and dry. No rash noted. He is not diaphoretic. No erythema.   Multiple salmon plaques on bilateral arms   Psychiatric: He has a normal mood and affect. His behavior is normal.   Vitals reviewed.    Significant labs reviewed:  Quant gold positive  HepBsAb positive  HepBsAg negative    Assessment:   53-year-old male  - Psoriasis with plans to start ixekuzumab  - Latent tuberculosis    Plan:   - Continue rifampin 600 mg PO qdaily x 4 months, started 3/26/2019  - Advised patient not to drink alcohol while on rifampin  - HIV, HepBc Ab, RPR today  - CBC with diff, CMP today, qmonthly    RTC 3 months    Radha Chilel MD MPH  Infectious Diseases [facility]

## 2019-04-30 LAB
HBV CORE AB SERPL QL IA: POSITIVE
HIV 1+2 AB+HIV1 P24 AG SERPL QL IA: NEGATIVE
RPR SER QL: NORMAL

## 2019-05-01 ENCOUNTER — TELEPHONE (OUTPATIENT)
Dept: DERMATOLOGY | Facility: CLINIC | Age: 54
End: 2019-05-01

## 2019-05-01 NOTE — TELEPHONE ENCOUNTER
----- Message from Krystal Guerra MD sent at 4/30/2019  6:10 PM CDT -----  Need to send paperwork for starting Taltz as pt is cleared by ID. Ok for pt to come get samples (3) to get started while we wait for him to get mailed drug. JAM    ----- Message -----  From: Radha Chilel MD  Sent: 4/29/2019   2:15 PM  To: Krystal Guerra MD    Mr. Turner has started rifampin for latent TB treatment - he can start taltz whenever he needs to.  Thanks

## 2019-05-01 NOTE — TELEPHONE ENCOUNTER
Spoke to pt.pt will come in on 5/3 to sign consent forms and  samples (3 boxes TALTZ) per verbal consent-Dr. Guerra, pt has been clear from ID dept/MD.to start biologic/TALTZ.

## 2019-05-03 ENCOUNTER — DOCUMENTATION ONLY (OUTPATIENT)
Dept: DERMATOLOGY | Facility: CLINIC | Age: 54
End: 2019-05-03

## 2019-05-03 NOTE — PROGRESS NOTES
Patient given and instructed: TALTZ DOSING SHEET.  3 Boxes/TALTZ LOT# [id number] EX: 01/2020  -WEEK 0/ 160mg (2x 80mg inj SubQ)  -WEEK 2/  80mg SubQ every two weeks    Paperwork/consents signed by pt and MD.

## 2019-05-21 ENCOUNTER — TELEPHONE (OUTPATIENT)
Dept: DERMATOLOGY | Facility: CLINIC | Age: 54
End: 2019-05-21

## 2019-05-21 NOTE — TELEPHONE ENCOUNTER
----- Message from Krystal Guerra MD sent at 5/21/2019  4:08 PM CDT -----  Contact: [facility] pharmacy/Nick  Yes. ID is treating him and gave ok for him to start taltz. JAM    ----- Message -----  From: Leda Dubose LPN  Sent: 5/21/2019   3:51 PM  To: Krystal Guerra MD    Pharmacy called and wanted to make sure we knew that patient was being treated for latent TB before they ship the Taltz to him.  Is it ok to ship medication to patient?  ----- Message -----  From: Leda Higuera  Sent: 5/21/2019   3:34 PM  To: [facility] Staff    [phone]-[facility] has question about above patient medication waiting on a call back thanks

## 2019-05-21 NOTE — TELEPHONE ENCOUNTER
Spoke with pharmacist Nicole and notified her per Dr. Guerra that patient is ok to receive Taltz.

## 2019-06-03 ENCOUNTER — LAB VISIT (OUTPATIENT)
Dept: LAB | Facility: HOSPITAL | Age: 54
End: 2019-06-03
Attending: INTERNAL MEDICINE
Payer: COMMERCIAL

## 2019-06-03 DIAGNOSIS — R76.12 POSITIVE QUANTIFERON-TB GOLD TEST: ICD-10-CM

## 2019-06-03 LAB
BASOPHILS # BLD AUTO: 0.05 K/UL (ref 0–0.2)
BASOPHILS NFR BLD: 0.8 % (ref 0–1.9)
DIFFERENTIAL METHOD: ABNORMAL
EOSINOPHIL # BLD AUTO: 0.2 K/UL (ref 0–0.5)
EOSINOPHIL NFR BLD: 4 % (ref 0–8)
ERYTHROCYTE [DISTWIDTH] IN BLOOD BY AUTOMATED COUNT: 13.3 % (ref 11.5–14.5)
HCT VFR BLD AUTO: 41.1 % (ref 40–54)
HGB BLD-MCNC: 13.6 G/DL (ref 14–18)
IMM GRANULOCYTES # BLD AUTO: 0.02 K/UL (ref 0–0.04)
IMM GRANULOCYTES NFR BLD AUTO: 0.3 % (ref 0–0.5)
LYMPHOCYTES # BLD AUTO: 2.1 K/UL (ref 1–4.8)
LYMPHOCYTES NFR BLD: 35.4 % (ref 18–48)
MCH RBC QN AUTO: 32 PG (ref 27–31)
MCHC RBC AUTO-ENTMCNC: 33.1 G/DL (ref 32–36)
MCV RBC AUTO: 97 FL (ref 82–98)
MONOCYTES # BLD AUTO: 0.4 K/UL (ref 0.3–1)
MONOCYTES NFR BLD: 7 % (ref 4–15)
NEUTROPHILS # BLD AUTO: 3.1 K/UL (ref 1.8–7.7)
NEUTROPHILS NFR BLD: 52.5 % (ref 38–73)
NRBC BLD-RTO: 0 /100 WBC
PLATELET # BLD AUTO: 176 K/UL (ref 150–350)
PMV BLD AUTO: 10.8 FL (ref 9.2–12.9)
RBC # BLD AUTO: 4.25 M/UL (ref 4.6–6.2)
WBC # BLD AUTO: 5.96 K/UL (ref 3.9–12.7)

## 2019-06-03 PROCEDURE — 85025 COMPLETE CBC W/AUTO DIFF WBC: CPT

## 2019-06-03 PROCEDURE — 36415 COLL VENOUS BLD VENIPUNCTURE: CPT

## 2019-06-07 ENCOUNTER — TELEPHONE (OUTPATIENT)
Dept: INFECTIOUS DISEASES | Facility: CLINIC | Age: 54
End: 2019-06-07

## 2019-06-07 NOTE — TELEPHONE ENCOUNTER
----- Message from Radha Chilel MD sent at 6/3/2019  5:11 PM CDT -----  Let him know his blood cell counts are normal, but we need to check his CMP.

## 2019-06-10 ENCOUNTER — LAB VISIT (OUTPATIENT)
Dept: LAB | Facility: HOSPITAL | Age: 54
End: 2019-06-10
Attending: INTERNAL MEDICINE
Payer: COMMERCIAL

## 2019-06-10 DIAGNOSIS — R76.12 POSITIVE QUANTIFERON-TB GOLD TEST: ICD-10-CM

## 2019-06-10 LAB
ALBUMIN SERPL BCP-MCNC: 3.6 G/DL (ref 3.5–5.2)
ALP SERPL-CCNC: 68 U/L (ref 55–135)
ALT SERPL W/O P-5'-P-CCNC: 25 U/L (ref 10–44)
ANION GAP SERPL CALC-SCNC: 7 MMOL/L (ref 8–16)
AST SERPL-CCNC: 20 U/L (ref 10–40)
BASOPHILS # BLD AUTO: 0.04 K/UL (ref 0–0.2)
BASOPHILS NFR BLD: 0.7 % (ref 0–1.9)
BILIRUB SERPL-MCNC: 0.2 MG/DL (ref 0.1–1)
BUN SERPL-MCNC: 18 MG/DL (ref 6–20)
CALCIUM SERPL-MCNC: 9.1 MG/DL (ref 8.7–10.5)
CHLORIDE SERPL-SCNC: 108 MMOL/L (ref 95–110)
CO2 SERPL-SCNC: 25 MMOL/L (ref 23–29)
CREAT SERPL-MCNC: 1 MG/DL (ref 0.5–1.4)
DIFFERENTIAL METHOD: ABNORMAL
EOSINOPHIL # BLD AUTO: 0.3 K/UL (ref 0–0.5)
EOSINOPHIL NFR BLD: 4.7 % (ref 0–8)
ERYTHROCYTE [DISTWIDTH] IN BLOOD BY AUTOMATED COUNT: 13.3 % (ref 11.5–14.5)
EST. GFR  (AFRICAN AMERICAN): >60 ML/MIN/1.73 M^2
EST. GFR  (NON AFRICAN AMERICAN): >60 ML/MIN/1.73 M^2
GLUCOSE SERPL-MCNC: 93 MG/DL (ref 70–110)
HCT VFR BLD AUTO: 39.8 % (ref 40–54)
HGB BLD-MCNC: 13 G/DL (ref 14–18)
IMM GRANULOCYTES # BLD AUTO: 0.02 K/UL (ref 0–0.04)
IMM GRANULOCYTES NFR BLD AUTO: 0.4 % (ref 0–0.5)
LYMPHOCYTES # BLD AUTO: 2 K/UL (ref 1–4.8)
LYMPHOCYTES NFR BLD: 35.8 % (ref 18–48)
MCH RBC QN AUTO: 32.7 PG (ref 27–31)
MCHC RBC AUTO-ENTMCNC: 32.7 G/DL (ref 32–36)
MCV RBC AUTO: 100 FL (ref 82–98)
MONOCYTES # BLD AUTO: 0.5 K/UL (ref 0.3–1)
MONOCYTES NFR BLD: 8.2 % (ref 4–15)
NEUTROPHILS # BLD AUTO: 2.8 K/UL (ref 1.8–7.7)
NEUTROPHILS NFR BLD: 50.2 % (ref 38–73)
NRBC BLD-RTO: 0 /100 WBC
PLATELET # BLD AUTO: 169 K/UL (ref 150–350)
PMV BLD AUTO: 11 FL (ref 9.2–12.9)
POTASSIUM SERPL-SCNC: 3.8 MMOL/L (ref 3.5–5.1)
PROT SERPL-MCNC: 6.5 G/DL (ref 6–8.4)
RBC # BLD AUTO: 3.98 M/UL (ref 4.6–6.2)
SODIUM SERPL-SCNC: 140 MMOL/L (ref 136–145)
WBC # BLD AUTO: 5.59 K/UL (ref 3.9–12.7)

## 2019-06-10 PROCEDURE — 36415 COLL VENOUS BLD VENIPUNCTURE: CPT

## 2019-06-10 PROCEDURE — 80053 COMPREHEN METABOLIC PANEL: CPT

## 2019-06-10 PROCEDURE — 85025 COMPLETE CBC W/AUTO DIFF WBC: CPT

## 2019-07-18 ENCOUNTER — TELEPHONE (OUTPATIENT)
Dept: ENDOSCOPY | Facility: HOSPITAL | Age: 54
End: 2019-07-18

## 2019-07-25 DIAGNOSIS — R76.12 POSITIVE QUANTIFERON-TB GOLD TEST: ICD-10-CM

## 2019-07-25 RX ORDER — RIFAMPIN 300 MG/1
CAPSULE ORAL
Qty: 60 CAPSULE | Refills: 0 | OUTPATIENT
Start: 2019-07-25

## 2019-07-29 ENCOUNTER — LAB VISIT (OUTPATIENT)
Dept: LAB | Facility: HOSPITAL | Age: 54
End: 2019-07-29
Attending: INTERNAL MEDICINE
Payer: COMMERCIAL

## 2019-07-29 DIAGNOSIS — R76.12 POSITIVE QUANTIFERON-TB GOLD TEST: ICD-10-CM

## 2019-07-29 LAB
ALBUMIN SERPL BCP-MCNC: 4 G/DL (ref 3.5–5.2)
ALP SERPL-CCNC: 73 U/L (ref 55–135)
ALT SERPL W/O P-5'-P-CCNC: 34 U/L (ref 10–44)
ANION GAP SERPL CALC-SCNC: 9 MMOL/L (ref 8–16)
AST SERPL-CCNC: 19 U/L (ref 10–40)
BILIRUB SERPL-MCNC: 0.3 MG/DL (ref 0.1–1)
BUN SERPL-MCNC: 12 MG/DL (ref 6–20)
CALCIUM SERPL-MCNC: 9.8 MG/DL (ref 8.7–10.5)
CHLORIDE SERPL-SCNC: 105 MMOL/L (ref 95–110)
CO2 SERPL-SCNC: 30 MMOL/L (ref 23–29)
CREAT SERPL-MCNC: 1.1 MG/DL (ref 0.5–1.4)
EST. GFR  (AFRICAN AMERICAN): >60 ML/MIN/1.73 M^2
EST. GFR  (NON AFRICAN AMERICAN): >60 ML/MIN/1.73 M^2
GLUCOSE SERPL-MCNC: 169 MG/DL (ref 70–110)
POTASSIUM SERPL-SCNC: 4 MMOL/L (ref 3.5–5.1)
PROT SERPL-MCNC: 7 G/DL (ref 6–8.4)
SODIUM SERPL-SCNC: 144 MMOL/L (ref 136–145)

## 2019-07-29 PROCEDURE — 80053 COMPREHEN METABOLIC PANEL: CPT

## 2019-07-29 PROCEDURE — 36415 COLL VENOUS BLD VENIPUNCTURE: CPT

## 2019-08-21 ENCOUNTER — TELEPHONE (OUTPATIENT)
Dept: DERMATOLOGY | Facility: CLINIC | Age: 54
End: 2019-08-21

## 2019-08-21 NOTE — TELEPHONE ENCOUNTER
LVM for pt to return call in ref to scheduling f/u appt for NIDIA Graff.(important needs f/u appt)

## 2019-09-23 DIAGNOSIS — L40.9 PSORIASIS: Primary | ICD-10-CM

## 2019-09-23 RX ORDER — IXEKIZUMAB 80 MG/ML
INJECTION, SOLUTION SUBCUTANEOUS
Qty: 1 SYRINGE | Refills: 0 | Status: SHIPPED | OUTPATIENT
Start: 2019-09-23 | End: 2019-10-25 | Stop reason: SDUPTHER

## 2019-09-27 DIAGNOSIS — Z79.899 ENCOUNTER FOR LONG-TERM (CURRENT) USE OF HIGH-RISK MEDICATION: Primary | ICD-10-CM

## 2019-10-24 ENCOUNTER — LAB VISIT (OUTPATIENT)
Dept: LAB | Facility: HOSPITAL | Age: 54
End: 2019-10-24
Attending: DERMATOLOGY
Payer: COMMERCIAL

## 2019-10-24 DIAGNOSIS — Z79.899 ENCOUNTER FOR LONG-TERM (CURRENT) USE OF HIGH-RISK MEDICATION: ICD-10-CM

## 2019-10-24 LAB
BASOPHILS # BLD AUTO: 0.04 K/UL (ref 0–0.2)
BASOPHILS NFR BLD: 0.6 % (ref 0–1.9)
DIFFERENTIAL METHOD: ABNORMAL
EOSINOPHIL # BLD AUTO: 0.2 K/UL (ref 0–0.5)
EOSINOPHIL NFR BLD: 2.4 % (ref 0–8)
ERYTHROCYTE [DISTWIDTH] IN BLOOD BY AUTOMATED COUNT: 13.9 % (ref 11.5–14.5)
HCT VFR BLD AUTO: 43.7 % (ref 40–54)
HGB BLD-MCNC: 14.8 G/DL (ref 14–18)
IMM GRANULOCYTES # BLD AUTO: 0.02 K/UL (ref 0–0.04)
IMM GRANULOCYTES NFR BLD AUTO: 0.3 % (ref 0–0.5)
LYMPHOCYTES # BLD AUTO: 1.4 K/UL (ref 1–4.8)
LYMPHOCYTES NFR BLD: 22.1 % (ref 18–48)
MCH RBC QN AUTO: 32.6 PG (ref 27–31)
MCHC RBC AUTO-ENTMCNC: 33.9 G/DL (ref 32–36)
MCV RBC AUTO: 96 FL (ref 82–98)
MONOCYTES # BLD AUTO: 0.5 K/UL (ref 0.3–1)
MONOCYTES NFR BLD: 7.8 % (ref 4–15)
NEUTROPHILS # BLD AUTO: 4.3 K/UL (ref 1.8–7.7)
NEUTROPHILS NFR BLD: 66.8 % (ref 38–73)
NRBC BLD-RTO: 0 /100 WBC
PLATELET # BLD AUTO: 167 K/UL (ref 150–350)
PMV BLD AUTO: 11.3 FL (ref 9.2–12.9)
RBC # BLD AUTO: 4.54 M/UL (ref 4.6–6.2)
WBC # BLD AUTO: 6.38 K/UL (ref 3.9–12.7)

## 2019-10-24 PROCEDURE — 85025 COMPLETE CBC W/AUTO DIFF WBC: CPT

## 2019-10-24 PROCEDURE — 36415 COLL VENOUS BLD VENIPUNCTURE: CPT

## 2019-10-25 ENCOUNTER — OFFICE VISIT (OUTPATIENT)
Dept: DERMATOLOGY | Facility: CLINIC | Age: 54
End: 2019-10-25
Payer: COMMERCIAL

## 2019-10-25 DIAGNOSIS — L40.9 PSORIASIS: ICD-10-CM

## 2019-10-25 DIAGNOSIS — Z79.899 ENCOUNTER FOR LONG-TERM (CURRENT) USE OF HIGH-RISK MEDICATION: ICD-10-CM

## 2019-10-25 DIAGNOSIS — L40.9 PSORIASIS: Primary | ICD-10-CM

## 2019-10-25 PROCEDURE — 99999 PR PBB SHADOW E&M-EST. PATIENT-LVL II: CPT | Mod: PBBFAC,,, | Performed by: DERMATOLOGY

## 2019-10-25 PROCEDURE — 99214 OFFICE O/P EST MOD 30 MIN: CPT | Mod: S$GLB,,, | Performed by: DERMATOLOGY

## 2019-10-25 PROCEDURE — 99214 PR OFFICE/OUTPT VISIT, EST, LEVL IV, 30-39 MIN: ICD-10-PCS | Mod: S$GLB,,, | Performed by: DERMATOLOGY

## 2019-10-25 PROCEDURE — 99999 PR PBB SHADOW E&M-EST. PATIENT-LVL II: ICD-10-PCS | Mod: PBBFAC,,, | Performed by: DERMATOLOGY

## 2019-10-25 NOTE — PROGRESS NOTES
Subjective:       Patient ID:  Jerry Turner is a 54 y.o. male who presents for   Chief Complaint   Patient presents with    Psoriasis     currently on TALTZ      Psoriasis  - Follow-up  Symptom course: resolved  Currently using: taltz 80mg qmonth - started may 2019.  Affected locations: currently clear  Signs / symptoms: asymptomatic        Review of Systems   Constitutional: Negative for weight loss and weight gain.   HENT: Negative for mouth sores (no tongue whiteness).    Respiratory: Negative for shortness of breath.    Gastrointestinal: Negative for nausea, vomiting, abdominal pain and diarrhea.   Musculoskeletal: Negative for arthralgias.   Skin: Negative for itching and rash ( ).        Injection site reaction - no     Psychiatric/Behavioral: Negative for depressed mood.        Objective:    Physical Exam   Constitutional: He appears well-developed and well-nourished. No distress.   Neurological: He is alert and oriented to person, place, and time. He is not disoriented.   Psychiatric: He has a normal mood and affect.   Skin:   Areas Examined (abnormalities noted in diagram):   Scalp / Hair Palpated and Inspected  Head / Face Inspection Performed  Neck Inspection Performed  Chest / Axilla Inspection Performed  Abdomen Inspection Performed  Genitals / Buttocks / Groin Inspection Performed  Back Inspection Performed  RUE Inspected  LUE Inspection Performed  RLE Inspected  LLE Inspection Performed  Nails and Digits Inspection Performed              Diagram Legend     Erythematous scaling macule/papule c/w actinic keratosis       Vascular papule c/w angioma      Pigmented verrucoid papule/plaque c/w seborrheic keratosis      Yellow umbilicated papule c/w sebaceous hyperplasia      Irregularly shaped tan macule c/w lentigo     1-2 mm smooth white papules consistent with Milia      Movable subcutaneous cyst with punctum c/w epidermal inclusion cyst      Subcutaneous movable cyst c/w pilar cyst      Firm pink to  brown papule c/w dermatofibroma      Pedunculated fleshy papule(s) c/w skin tag(s)      Evenly pigmented macule c/w junctional nevus     Mildly variegated pigmented, slightly irregular-bordered macule c/w mildly atypical nevus      Flesh colored to evenly pigmented papule c/w intradermal nevus       Pink pearly papule/plaque c/w basal cell carcinoma      Erythematous hyperkeratotic cursted plaque c/w SCC      Surgical scar with no sign of skin cancer recurrence      Open and closed comedones      Inflammatory papules and pustules      Verrucoid papule consistent consistent with wart     Erythematous eczematous patches and plaques     Dystrophic onycholytic nail with subungual debris c/w onychomycosis     Umbilicated papule    Erythematous-base heme-crusted tan verrucoid plaque consistent with inflamed seborrheic keratosis     Erythematous Silvery Scaling Plaque c/w Psoriasis     See annotation    Lab Results   Component Value Date    WBC 6.38 10/24/2019    HGB 14.8 10/24/2019    HCT 43.7 10/24/2019    MCV 96 10/24/2019     10/24/2019         Assessment / Plan:        Psoriasis    Encounter for long-term (current) use of high-risk medication  -     CBC auto differential; Future; Expected date: 10/25/2019  -     X-Ray Chest PA And Lateral; Future; Expected date: 10/25/2019      Psoriasis  Today's Plan:      Cont taltz at 80mg SQ q month    Not using topicals  D/c smoking    F/u with me in 6 months with cbc and cxr.      Follow up in about 6 months (around 4/25/2020) for with labs.

## 2019-10-25 NOTE — ASSESSMENT & PLAN NOTE
Today's Plan:      Cont taltz at 80mg SQ q month    Not using topicals  D/c smoking    F/u with me in 6 months with cbc and cxr.

## 2020-01-29 ENCOUNTER — OFFICE VISIT (OUTPATIENT)
Dept: INTERNAL MEDICINE | Facility: CLINIC | Age: 55
End: 2020-01-29
Payer: COMMERCIAL

## 2020-01-29 VITALS
HEIGHT: 66 IN | TEMPERATURE: 98 F | RESPIRATION RATE: 16 BRPM | BODY MASS INDEX: 28.38 KG/M2 | DIASTOLIC BLOOD PRESSURE: 80 MMHG | HEART RATE: 76 BPM | WEIGHT: 176.56 LBS | SYSTOLIC BLOOD PRESSURE: 120 MMHG

## 2020-01-29 DIAGNOSIS — Z83.3 FAMILY HISTORY OF DIABETES MELLITUS: ICD-10-CM

## 2020-01-29 DIAGNOSIS — Z00.00 WELL ADULT EXAM: Primary | ICD-10-CM

## 2020-01-29 DIAGNOSIS — Z12.11 COLON CANCER SCREENING: ICD-10-CM

## 2020-01-29 DIAGNOSIS — Z12.5 PROSTATE CANCER SCREENING: ICD-10-CM

## 2020-01-29 DIAGNOSIS — L40.9 PSORIASIS: ICD-10-CM

## 2020-01-29 DIAGNOSIS — Z23 NEED FOR IMMUNIZATION AGAINST VACCINIA VIRUS INFECTION: ICD-10-CM

## 2020-01-29 DIAGNOSIS — F17.200 SMOKER: ICD-10-CM

## 2020-01-29 DIAGNOSIS — E78.5 HYPERLIPIDEMIA, UNSPECIFIED HYPERLIPIDEMIA TYPE: Primary | ICD-10-CM

## 2020-01-29 PROCEDURE — 99213 PR OFFICE/OUTPT VISIT, EST, LEVL III, 20-29 MIN: ICD-10-PCS | Mod: 25,S$GLB,, | Performed by: FAMILY MEDICINE

## 2020-01-29 PROCEDURE — 90471 IMMUNIZATION ADMIN: CPT | Mod: S$GLB,,, | Performed by: FAMILY MEDICINE

## 2020-01-29 PROCEDURE — 3008F BODY MASS INDEX DOCD: CPT | Mod: CPTII,S$GLB,, | Performed by: FAMILY MEDICINE

## 2020-01-29 PROCEDURE — 99213 OFFICE O/P EST LOW 20 MIN: CPT | Mod: 25,S$GLB,, | Performed by: FAMILY MEDICINE

## 2020-01-29 PROCEDURE — 99999 PR PBB SHADOW E&M-EST. PATIENT-LVL III: ICD-10-PCS | Mod: PBBFAC,,, | Performed by: FAMILY MEDICINE

## 2020-01-29 PROCEDURE — 3008F PR BODY MASS INDEX (BMI) DOCUMENTED: ICD-10-PCS | Mod: CPTII,S$GLB,, | Performed by: FAMILY MEDICINE

## 2020-01-29 PROCEDURE — 90471 FLU VACCINE (QUAD) GREATER THAN OR EQUAL TO 3YO PRESERVATIVE FREE IM: ICD-10-PCS | Mod: S$GLB,,, | Performed by: FAMILY MEDICINE

## 2020-01-29 PROCEDURE — 99999 PR PBB SHADOW E&M-EST. PATIENT-LVL III: CPT | Mod: PBBFAC,,, | Performed by: FAMILY MEDICINE

## 2020-01-29 PROCEDURE — 90686 FLU VACCINE (QUAD) GREATER THAN OR EQUAL TO 3YO PRESERVATIVE FREE IM: ICD-10-PCS | Mod: S$GLB,,, | Performed by: FAMILY MEDICINE

## 2020-01-29 PROCEDURE — 90686 IIV4 VACC NO PRSV 0.5 ML IM: CPT | Mod: S$GLB,,, | Performed by: FAMILY MEDICINE

## 2020-01-29 NOTE — PROGRESS NOTES
Subjective:       Patient ID: Jerry Turner is a 54 y.o. male.    Chief Complaint: Follow-up (cholesterol)    HPI 54-year-old Indonesian male presents to clinic today secondary to follow-up of a lipid panel performed by his work on Saturday.  He reports that he was fasting for the blood test but had celebrated the Chinese new year the night prior and had drank excessively.  Total cholesterol level returned extremely elevated as did LDL level.  Levels are falsely elevated secondary to alcohol intake.  The patient is due for physical exam next month at which time I recommend repeat labs.  I have informed the patient to fast for 10 hr and no alcohol intake for 24 hr.  Secondarily, the patient is due for a colonoscopy and influenza vaccine.  Review of Systems   Constitutional: Negative for appetite change, chills, fatigue and fever.   HENT: Negative for congestion, ear pain, hearing loss, postnasal drip, rhinorrhea, sinus pressure, sore throat and tinnitus.    Eyes: Negative for redness, itching and visual disturbance.   Respiratory: Negative for cough, chest tightness and shortness of breath.    Cardiovascular: Negative for chest pain and palpitations.   Gastrointestinal: Negative for abdominal pain, constipation, diarrhea, nausea and vomiting.   Genitourinary: Negative for decreased urine volume, difficulty urinating, dysuria, frequency, hematuria and urgency.   Musculoskeletal: Negative for back pain, myalgias, neck pain and neck stiffness.   Skin: Negative for rash.   Neurological: Negative for dizziness, light-headedness and headaches.   Psychiatric/Behavioral: Negative.        Objective:      Physical Exam   Constitutional: He is oriented to person, place, and time. He appears well-developed and well-nourished. No distress.   HENT:   Head: Normocephalic and atraumatic.   Right Ear: External ear normal.   Left Ear: External ear normal.   Nose: Nose normal.   Mouth/Throat: Oropharynx is clear and moist. No oropharyngeal  exudate.   Eyes: Pupils are equal, round, and reactive to light. Conjunctivae and EOM are normal. Right eye exhibits no discharge. Left eye exhibits no discharge. No scleral icterus.   Neck: Normal range of motion. Neck supple. No JVD present. No tracheal deviation present. No thyromegaly present.   Cardiovascular: Normal rate, regular rhythm, normal heart sounds and intact distal pulses. Exam reveals no gallop and no friction rub.   No murmur heard.  Pulmonary/Chest: Effort normal and breath sounds normal. No stridor. No respiratory distress. He has no wheezes. He has no rales.   Abdominal: Soft. Bowel sounds are normal. He exhibits no distension and no mass. There is no tenderness. There is no rebound and no guarding.   Musculoskeletal: Normal range of motion. He exhibits no edema or tenderness.   Lymphadenopathy:     He has no cervical adenopathy.   Neurological: He is alert and oriented to person, place, and time.   Skin: Skin is warm and dry. No rash noted. He is not diaphoretic. No erythema. No pallor.   Psychiatric: He has a normal mood and affect. His behavior is normal. Judgment and thought content normal.   Nursing note and vitals reviewed.      Assessment:       1. Hyperlipidemia, unspecified hyperlipidemia type    2. Colon cancer screening    3. Need for immunization against vaccinia virus infection        Plan:       1.  Lipid panel is falsely elevated secondary to the patient having alcohol the night prior.  Will repeat panel with annual fasting labs.  I have explained to the patient that he needs to fast for 10 hr and have no alcohol intake for 24 hr prior.  2.  Screening colonoscopy.  3.  Flu vaccine given.  4.  Return to clinic as needed or in 1 month for annual exam.

## 2020-04-07 ENCOUNTER — TELEPHONE (OUTPATIENT)
Dept: DERMATOLOGY | Facility: CLINIC | Age: 55
End: 2020-04-07

## 2020-04-08 ENCOUNTER — PATIENT MESSAGE (OUTPATIENT)
Dept: DERMATOLOGY | Facility: CLINIC | Age: 55
End: 2020-04-08

## 2020-04-08 ENCOUNTER — TELEPHONE (OUTPATIENT)
Dept: DERMATOLOGY | Facility: CLINIC | Age: 55
End: 2020-04-08

## 2020-04-08 NOTE — TELEPHONE ENCOUNTER
----- Message from Gunner Castellanos sent at 4/8/2020 10:59 AM CDT -----  Contact: pt  Pt is returning call regarding canceled appt . Please give pt a call back at [phone] .

## 2020-04-09 ENCOUNTER — PATIENT OUTREACH (OUTPATIENT)
Dept: ADMINISTRATIVE | Facility: OTHER | Age: 55
End: 2020-04-09

## 2020-04-10 NOTE — PROGRESS NOTES
Chart reviewed.   Immunizations: updated  Orders placed: n/a  Upcoming appts to satisfy MARTÍNEZ topics: n/a  Open case request for Colonoscopy.

## 2020-04-13 ENCOUNTER — PATIENT MESSAGE (OUTPATIENT)
Dept: DERMATOLOGY | Facility: CLINIC | Age: 55
End: 2020-04-13

## 2020-04-14 DIAGNOSIS — Z79.899 ENCOUNTER FOR LONG-TERM (CURRENT) USE OF HIGH-RISK MEDICATION: Primary | ICD-10-CM

## 2020-04-15 ENCOUNTER — TELEPHONE (OUTPATIENT)
Dept: DERMATOLOGY | Facility: CLINIC | Age: 55
End: 2020-04-15

## 2020-04-15 NOTE — TELEPHONE ENCOUNTER
----- Message from Gwendolyn Coronel sent at 4/15/2020 12:44 PM CDT -----  Contact: Ana/ at [phone]  [facility] pt-Pts appt needs to be moved to Friday April 17 at 1100 per Jam.  But the nurse needs to call the  and she has to get on the phone with the pt and all together change the apopt if the pt can come on Friday.  Please call Ana.  Will need to get the [place]  on the line with you guys.

## 2020-04-16 ENCOUNTER — PATIENT OUTREACH (OUTPATIENT)
Dept: ADMINISTRATIVE | Facility: OTHER | Age: 55
End: 2020-04-16

## 2020-04-16 NOTE — PROGRESS NOTES
Chart reviewed.   Immunizations: updated  Orders placed: n/  Upcoming appts to satisfy MARTÍNEZ topics: n/a

## 2020-04-17 ENCOUNTER — TELEPHONE (OUTPATIENT)
Dept: INTERNAL MEDICINE | Facility: CLINIC | Age: 55
End: 2020-04-17

## 2020-04-17 ENCOUNTER — OFFICE VISIT (OUTPATIENT)
Dept: DERMATOLOGY | Facility: CLINIC | Age: 55
End: 2020-04-17
Payer: COMMERCIAL

## 2020-04-17 DIAGNOSIS — Z79.899 ENCOUNTER FOR LONG-TERM (CURRENT) USE OF HIGH-RISK MEDICATION: ICD-10-CM

## 2020-04-17 DIAGNOSIS — L40.9 PSORIASIS: Primary | ICD-10-CM

## 2020-04-17 PROCEDURE — 99213 PR OFFICE/OUTPT VISIT, EST, LEVL III, 20-29 MIN: ICD-10-PCS | Mod: 95,,, | Performed by: DERMATOLOGY

## 2020-04-17 PROCEDURE — 99213 OFFICE O/P EST LOW 20 MIN: CPT | Mod: 95,,, | Performed by: DERMATOLOGY

## 2020-04-17 NOTE — ASSESSMENT & PLAN NOTE
Today's Plan:      Cont taltz at 80mg SQ qmonth - needs CXR and cbc -- will wait until things improve post covid    F/u 6 months -- will need cbc then too

## 2020-04-17 NOTE — TELEPHONE ENCOUNTER
Noted.  This visit will require a telephone .   Noted in appointment comments.  Pt is scheduled for 5/1/20 for a physical.  Fyi.

## 2020-04-17 NOTE — PROGRESS NOTES
Subjective:       Patient ID:  Jerry Turner is a 54 y.o. male who presents for No chief complaint on file.    Psoriasis  - Follow-up  Symptom course: resolved  Currently using: taltz 80mg qmonth - started may 2019; no topicals.  Affected locations: right arm, left elbow and left knee  Signs / symptoms: itching (occ)        Review of Systems   Constitutional: Negative for fever, weight loss and weight gain.   HENT: Negative for mouth sores (no tongue whiteness).    Respiratory: Negative for cough and shortness of breath.         + smokes 1/2 ppd   Gastrointestinal: Negative for nausea, vomiting, abdominal pain and diarrhea.   Musculoskeletal: Positive for arthralgias (gout).   Skin: Positive for rash ( left knee and left elbow and right forearm). Negative for itching.        Injection site reaction - yes - gets bruise           Objective:    Physical Exam   Constitutional: He appears well-developed and well-nourished. No distress.   Neurological: He is alert and oriented to person, place, and time. He is not disoriented.   Psychiatric: He has a normal mood and affect.   Skin:   Areas Examined (abnormalities noted in diagram):   RUE Inspected  LUE Inspection Performed  RLE Inspected  LLE Inspection Performed              Diagram Legend     Erythematous scaling macule/papule c/w actinic keratosis       Vascular papule c/w angioma      Pigmented verrucoid papule/plaque c/w seborrheic keratosis      Yellow umbilicated papule c/w sebaceous hyperplasia      Irregularly shaped tan macule c/w lentigo     1-2 mm smooth white papules consistent with Milia      Movable subcutaneous cyst with punctum c/w epidermal inclusion cyst      Subcutaneous movable cyst c/w pilar cyst      Firm pink to brown papule c/w dermatofibroma      Pedunculated fleshy papule(s) c/w skin tag(s)      Evenly pigmented macule c/w junctional nevus     Mildly variegated pigmented, slightly irregular-bordered macule c/w mildly atypical nevus      Flesh  colored to evenly pigmented papule c/w intradermal nevus       Pink pearly papule/plaque c/w basal cell carcinoma      Erythematous hyperkeratotic cursted plaque c/w SCC      Surgical scar with no sign of skin cancer recurrence      Open and closed comedones      Inflammatory papules and pustules      Verrucoid papule consistent consistent with wart     Erythematous eczematous patches and plaques     Dystrophic onycholytic nail with subungual debris c/w onychomycosis     Umbilicated papule    Erythematous-base heme-crusted tan verrucoid plaque consistent with inflamed seborrheic keratosis     Erythematous Silvery Scaling Plaque c/w Psoriasis     See annotation                Lab Results   Component Value Date    WBC 6.38 10/24/2019    HGB 14.8 10/24/2019    HCT 43.7 10/24/2019    MCV 96 10/24/2019     10/24/2019         Assessment / Plan:        Psoriasis    Encounter for long-term (current) use of high-risk medication  -     CBC auto differential; Future; Expected date: 04/17/2020  -     X-Ray Chest PA And Lateral; Future; Expected date: 04/17/2020      Psoriasis  Today's Plan:      Cont taltz at 80mg SQ qmonth - needs CXR and cbc -- will wait until things improve post covid    F/u 6 months -- will need cbc then too  The patient location is: work  The chief complaint leading to consultation is: psoriasis/biologic f/u  Visit type: audiovisual ( on 3 party call)  Total time spent with patient: 9 minutes  Each patient to whom he or she provides medical services by telemedicine is:  (1) informed of the relationship between the physician and patient and the respective role of any other health care provider with respect to management of the patient; and (2) notified that he or she may decline to receive medical services by telemedicine and may withdraw from such care at any time.      No follow-ups on file.

## 2020-04-17 NOTE — TELEPHONE ENCOUNTER
----- Message from Sarita Mchugh sent at 4/8/2020 12:31 PM CDT -----  Contact: [phone]   Department called to let Dr. 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 They can do phone  at [phone]

## 2020-04-20 ENCOUNTER — HOSPITAL ENCOUNTER (OUTPATIENT)
Dept: RADIOLOGY | Facility: HOSPITAL | Age: 55
Discharge: HOME | End: 2020-04-20
Attending: DERMATOLOGY
Payer: COMMERCIAL

## 2020-04-20 DIAGNOSIS — Z79.899 ENCOUNTER FOR LONG-TERM (CURRENT) USE OF HIGH-RISK MEDICATION: ICD-10-CM

## 2020-04-20 PROCEDURE — 71046 X-RAY EXAM CHEST 2 VIEWS: CPT | Mod: 26,,, | Performed by: RADIOLOGY

## 2020-04-20 PROCEDURE — 71046 XR CHEST PA AND LATERAL: ICD-10-PCS | Mod: 26,,, | Performed by: RADIOLOGY

## 2020-04-20 PROCEDURE — 71046 X-RAY EXAM CHEST 2 VIEWS: CPT | Mod: TC,FY

## 2020-04-21 DIAGNOSIS — L40.9 PSORIASIS: ICD-10-CM

## 2020-04-27 ENCOUNTER — LAB VISIT (OUTPATIENT)
Dept: LAB | Facility: HOSPITAL | Age: 55
End: 2020-04-27
Attending: FAMILY MEDICINE
Payer: COMMERCIAL

## 2020-04-27 ENCOUNTER — TELEPHONE (OUTPATIENT)
Dept: INTERNAL MEDICINE | Facility: CLINIC | Age: 55
End: 2020-04-27

## 2020-04-27 DIAGNOSIS — Z12.5 PROSTATE CANCER SCREENING: ICD-10-CM

## 2020-04-27 DIAGNOSIS — Z00.00 WELL ADULT EXAM: ICD-10-CM

## 2020-04-27 LAB
25(OH)D3+25(OH)D2 SERPL-MCNC: 26 NG/ML (ref 30–96)
ALBUMIN SERPL BCP-MCNC: 4 G/DL (ref 3.5–5.2)
ALP SERPL-CCNC: 57 U/L (ref 55–135)
ALT SERPL W/O P-5'-P-CCNC: 19 U/L (ref 10–44)
ANION GAP SERPL CALC-SCNC: 8 MMOL/L (ref 8–16)
AST SERPL-CCNC: 21 U/L (ref 10–40)
BASOPHILS # BLD AUTO: 0.05 K/UL (ref 0–0.2)
BASOPHILS NFR BLD: 0.8 % (ref 0–1.9)
BILIRUB SERPL-MCNC: 0.4 MG/DL (ref 0.1–1)
BUN SERPL-MCNC: 21 MG/DL (ref 6–20)
CALCIUM SERPL-MCNC: 9.3 MG/DL (ref 8.7–10.5)
CHLORIDE SERPL-SCNC: 104 MMOL/L (ref 95–110)
CHOLEST SERPL-MCNC: 262 MG/DL (ref 120–199)
CHOLEST/HDLC SERPL: 4.7 {RATIO} (ref 2–5)
CO2 SERPL-SCNC: 25 MMOL/L (ref 23–29)
COMPLEXED PSA SERPL-MCNC: 0.54 NG/ML (ref 0–4)
CREAT SERPL-MCNC: 0.8 MG/DL (ref 0.5–1.4)
DIFFERENTIAL METHOD: ABNORMAL
EOSINOPHIL # BLD AUTO: 0.4 K/UL (ref 0–0.5)
EOSINOPHIL NFR BLD: 6.3 % (ref 0–8)
ERYTHROCYTE [DISTWIDTH] IN BLOOD BY AUTOMATED COUNT: 13.6 % (ref 11.5–14.5)
EST. GFR  (AFRICAN AMERICAN): >60 ML/MIN/1.73 M^2
EST. GFR  (NON AFRICAN AMERICAN): >60 ML/MIN/1.73 M^2
ESTIMATED AVG GLUCOSE: 120 MG/DL (ref 68–131)
GLUCOSE SERPL-MCNC: 104 MG/DL (ref 70–110)
HBA1C MFR BLD HPLC: 5.8 % (ref 4–5.6)
HCT VFR BLD AUTO: 46 % (ref 40–54)
HDLC SERPL-MCNC: 56 MG/DL (ref 40–75)
HDLC SERPL: 21.4 % (ref 20–50)
HGB BLD-MCNC: 14.5 G/DL (ref 14–18)
IMM GRANULOCYTES # BLD AUTO: 0.03 K/UL (ref 0–0.04)
IMM GRANULOCYTES NFR BLD AUTO: 0.5 % (ref 0–0.5)
LDLC SERPL CALC-MCNC: 171.6 MG/DL (ref 63–159)
LYMPHOCYTES # BLD AUTO: 3.1 K/UL (ref 1–4.8)
LYMPHOCYTES NFR BLD: 46.8 % (ref 18–48)
MCH RBC QN AUTO: 32.6 PG (ref 27–31)
MCHC RBC AUTO-ENTMCNC: 31.5 G/DL (ref 32–36)
MCV RBC AUTO: 103 FL (ref 82–98)
MONOCYTES # BLD AUTO: 0.6 K/UL (ref 0.3–1)
MONOCYTES NFR BLD: 8.4 % (ref 4–15)
NEUTROPHILS # BLD AUTO: 2.5 K/UL (ref 1.8–7.7)
NEUTROPHILS NFR BLD: 37.2 % (ref 38–73)
NONHDLC SERPL-MCNC: 206 MG/DL
NRBC BLD-RTO: 0 /100 WBC
PLATELET # BLD AUTO: 199 K/UL (ref 150–350)
PMV BLD AUTO: 10.9 FL (ref 9.2–12.9)
POTASSIUM SERPL-SCNC: 4.2 MMOL/L (ref 3.5–5.1)
PROT SERPL-MCNC: 7.2 G/DL (ref 6–8.4)
RBC # BLD AUTO: 4.45 M/UL (ref 4.6–6.2)
SODIUM SERPL-SCNC: 137 MMOL/L (ref 136–145)
T4 FREE SERPL-MCNC: 0.86 NG/DL (ref 0.71–1.51)
TRIGL SERPL-MCNC: 172 MG/DL (ref 30–150)
TSH SERPL DL<=0.005 MIU/L-ACNC: 1.9 UIU/ML (ref 0.4–4)
WBC # BLD AUTO: 6.56 K/UL (ref 3.9–12.7)

## 2020-04-27 PROCEDURE — 85025 COMPLETE CBC W/AUTO DIFF WBC: CPT

## 2020-04-27 PROCEDURE — 36415 COLL VENOUS BLD VENIPUNCTURE: CPT | Mod: PO

## 2020-04-27 PROCEDURE — 82306 VITAMIN D 25 HYDROXY: CPT

## 2020-04-27 PROCEDURE — 80061 LIPID PANEL: CPT

## 2020-04-27 PROCEDURE — 84443 ASSAY THYROID STIM HORMONE: CPT

## 2020-04-27 PROCEDURE — 83036 HEMOGLOBIN GLYCOSYLATED A1C: CPT

## 2020-04-27 PROCEDURE — 84153 ASSAY OF PSA TOTAL: CPT

## 2020-04-27 PROCEDURE — 80053 COMPREHEN METABOLIC PANEL: CPT

## 2020-04-27 PROCEDURE — 84439 ASSAY OF FREE THYROXINE: CPT

## 2020-04-27 NOTE — TELEPHONE ENCOUNTER
I called the  line & spoke with Madison. She called & LMOR for pt re: changing his appt from Friday May 1st to Tuesday May 5th @ 4pm.   I changed the appt in the computer. I mailed an appt time letter with the change & the reason for the change of appt to the pt.

## 2020-05-15 DIAGNOSIS — L40.9 PSORIASIS: ICD-10-CM

## 2020-05-26 ENCOUNTER — PATIENT OUTREACH (OUTPATIENT)
Dept: ADMINISTRATIVE | Facility: HOSPITAL | Age: 55
End: 2020-05-26

## 2020-06-09 ENCOUNTER — OFFICE VISIT (OUTPATIENT)
Dept: INTERNAL MEDICINE | Facility: CLINIC | Age: 55
End: 2020-06-09
Payer: COMMERCIAL

## 2020-06-09 VITALS
SYSTOLIC BLOOD PRESSURE: 135 MMHG | WEIGHT: 170 LBS | HEART RATE: 76 BPM | DIASTOLIC BLOOD PRESSURE: 82 MMHG | BODY MASS INDEX: 27.32 KG/M2 | HEIGHT: 66 IN | TEMPERATURE: 99 F

## 2020-06-09 DIAGNOSIS — E78.5 HYPERLIPIDEMIA, UNSPECIFIED HYPERLIPIDEMIA TYPE: ICD-10-CM

## 2020-06-09 DIAGNOSIS — L40.9 PSORIASIS: ICD-10-CM

## 2020-06-09 DIAGNOSIS — Z12.11 COLON CANCER SCREENING: ICD-10-CM

## 2020-06-09 DIAGNOSIS — Z00.00 WELL ADULT EXAM: Primary | ICD-10-CM

## 2020-06-09 PROCEDURE — 99396 PREV VISIT EST AGE 40-64: CPT | Mod: S$GLB,,, | Performed by: FAMILY MEDICINE

## 2020-06-09 PROCEDURE — 99396 PR PREVENTIVE VISIT,EST,40-64: ICD-10-PCS | Mod: S$GLB,,, | Performed by: FAMILY MEDICINE

## 2020-06-09 PROCEDURE — 99999 PR PBB SHADOW E&M-EST. PATIENT-LVL III: ICD-10-PCS | Mod: PBBFAC,,, | Performed by: FAMILY MEDICINE

## 2020-06-09 PROCEDURE — 99999 PR PBB SHADOW E&M-EST. PATIENT-LVL III: CPT | Mod: PBBFAC,,, | Performed by: FAMILY MEDICINE

## 2020-06-09 NOTE — PATIENT INSTRUCTIONS
Hydrate with water and Gatorade daily at least 64 ounces total.  Limit alcohol use to one drink per day.

## 2020-06-09 NOTE — PROGRESS NOTES
Subjective:       Patient ID: Jerry Turner is a 54 y.o. male.    Chief Complaint: Annual Exam    HPI 54-year-old [place] male presents to clinic today for annual physical exam.  He continues to be followed by dermatology for treatment of psoriasis which remains stable on Taltz.  He continues to have hyperlipidemia but reports frequent alcohol use with as many as 4 beers daily.  I have discussed alcohol use with the patient and recommend that he decrease his alcohol intake to no more than 2 drinks daily.  Patient expresses understanding.  He reports a past surgical history of dental implants.  He reports a family history of his mother having breast cancer and diabetes.  He is up-to-date with all vaccinations.  Colonoscopy has been discussed and ordered.  Review of Systems   Constitutional: Negative for appetite change, chills, fatigue and fever.   HENT: Negative for congestion, ear pain, hearing loss, postnasal drip, rhinorrhea, sinus pressure, sore throat and tinnitus.    Eyes: Negative for redness, itching and visual disturbance.   Respiratory: Negative for cough, chest tightness and shortness of breath.    Cardiovascular: Negative for chest pain and palpitations.   Gastrointestinal: Negative for abdominal pain, constipation, diarrhea, nausea and vomiting.   Genitourinary: Negative for decreased urine volume, difficulty urinating, dysuria, frequency, hematuria and urgency.   Musculoskeletal: Positive for arthralgias. Negative for back pain, myalgias, neck pain and neck stiffness.   Skin: Negative for rash.   Neurological: Negative for dizziness, light-headedness and headaches.   Psychiatric/Behavioral: Negative.        Objective:      Physical Exam   Constitutional: He is oriented to person, place, and time. He appears well-developed and well-nourished. No distress.   HENT:   Head: Normocephalic and atraumatic.   Right Ear: External ear normal.   Left Ear: External ear normal.   Nose: Nose normal.   Mouth/Throat:  Oropharynx is clear and moist. No oropharyngeal exudate.   Eyes: Pupils are equal, round, and reactive to light. Conjunctivae and EOM are normal. Right eye exhibits no discharge. Left eye exhibits no discharge. No scleral icterus.   Neck: Normal range of motion. Neck supple. No JVD present. No tracheal deviation present. No thyromegaly present.   Cardiovascular: Normal rate, regular rhythm, normal heart sounds and intact distal pulses. Exam reveals no gallop and no friction rub.   No murmur heard.  Pulmonary/Chest: Effort normal and breath sounds normal. No stridor. No respiratory distress. He has no wheezes. He has no rales.   Abdominal: Soft. Bowel sounds are normal. He exhibits no distension and no mass. There is no tenderness. There is no rebound and no guarding.   Musculoskeletal: Normal range of motion. He exhibits no edema or tenderness.   Lymphadenopathy:     He has no cervical adenopathy.   Neurological: He is alert and oriented to person, place, and time.   Skin: Skin is warm and dry. No rash noted. He is not diaphoretic. No erythema. No pallor.   Psychiatric: He has a normal mood and affect. His behavior is normal. Judgment and thought content normal.   Nursing note and vitals reviewed.      Assessment:       1. Well adult exam    2. Psoriasis    3. Hyperlipidemia, unspecified hyperlipidemia type    4. Colon cancer screening        Plan:       1.  Labs have been reviewed and are overall within normal limits except for hyperlipidemia.  2.  Continue follow-up with Dermatology in [facility] as prescribed.  Psoriasis is stable.  3.  Encourage decreased alcohol use.  4.  Screening colonoscopy.  5.  Return to clinic as needed or in 1 year for annual exam.

## 2020-10-13 ENCOUNTER — PATIENT MESSAGE (OUTPATIENT)
Dept: DERMATOLOGY | Facility: CLINIC | Age: 55
End: 2020-10-13

## 2020-10-30 ENCOUNTER — PATIENT MESSAGE (OUTPATIENT)
Dept: ADMINISTRATIVE | Facility: HOSPITAL | Age: 55
End: 2020-10-30

## 2021-01-19 ENCOUNTER — PATIENT MESSAGE (OUTPATIENT)
Dept: DERMATOLOGY | Facility: CLINIC | Age: 56
End: 2021-01-19

## 2021-01-28 ENCOUNTER — PATIENT OUTREACH (OUTPATIENT)
Dept: ADMINISTRATIVE | Facility: OTHER | Age: 56
End: 2021-01-28

## 2021-01-29 ENCOUNTER — LAB VISIT (OUTPATIENT)
Dept: LAB | Facility: HOSPITAL | Age: 56
End: 2021-01-29
Attending: DERMATOLOGY
Payer: COMMERCIAL

## 2021-01-29 ENCOUNTER — OFFICE VISIT (OUTPATIENT)
Dept: DERMATOLOGY | Facility: CLINIC | Age: 56
End: 2021-01-29
Payer: COMMERCIAL

## 2021-01-29 DIAGNOSIS — Z79.899 ENCOUNTER FOR LONG-TERM (CURRENT) USE OF HIGH-RISK MEDICATION: ICD-10-CM

## 2021-01-29 DIAGNOSIS — L40.9 PSORIASIS: Primary | ICD-10-CM

## 2021-01-29 LAB
ANISOCYTOSIS BLD QL SMEAR: SLIGHT
BASOPHILS # BLD AUTO: 0.04 K/UL (ref 0–0.2)
BASOPHILS NFR BLD: 0.6 % (ref 0–1.9)
DIFFERENTIAL METHOD: ABNORMAL
EOSINOPHIL # BLD AUTO: 0.3 K/UL (ref 0–0.5)
EOSINOPHIL NFR BLD: 4.5 % (ref 0–8)
ERYTHROCYTE [DISTWIDTH] IN BLOOD BY AUTOMATED COUNT: 13.3 % (ref 11.5–14.5)
HCT VFR BLD AUTO: 44.6 % (ref 40–54)
HGB BLD-MCNC: 14.5 G/DL (ref 14–18)
IMM GRANULOCYTES # BLD AUTO: 0.02 K/UL (ref 0–0.04)
IMM GRANULOCYTES NFR BLD AUTO: 0.3 % (ref 0–0.5)
LYMPHOCYTES # BLD AUTO: 3 K/UL (ref 1–4.8)
LYMPHOCYTES NFR BLD: 46.2 % (ref 18–48)
MCH RBC QN AUTO: 33.1 PG (ref 27–31)
MCHC RBC AUTO-ENTMCNC: 32.5 G/DL (ref 32–36)
MCV RBC AUTO: 102 FL (ref 82–98)
MONOCYTES # BLD AUTO: 0.6 K/UL (ref 0.3–1)
MONOCYTES NFR BLD: 9.5 % (ref 4–15)
NEUTROPHILS # BLD AUTO: 2.5 K/UL (ref 1.8–7.7)
NEUTROPHILS NFR BLD: 38.9 % (ref 38–73)
NRBC BLD-RTO: 0 /100 WBC
PLATELET # BLD AUTO: 199 K/UL (ref 150–350)
PLATELET BLD QL SMEAR: ABNORMAL
PMV BLD AUTO: 11.1 FL (ref 9.2–12.9)
RBC # BLD AUTO: 4.38 M/UL (ref 4.6–6.2)
WBC # BLD AUTO: 6.41 K/UL (ref 3.9–12.7)

## 2021-01-29 PROCEDURE — 99999 PR PBB SHADOW E&M-EST. PATIENT-LVL II: ICD-10-PCS | Mod: PBBFAC,,, | Performed by: DERMATOLOGY

## 2021-01-29 PROCEDURE — 85025 COMPLETE CBC W/AUTO DIFF WBC: CPT

## 2021-01-29 PROCEDURE — 36415 COLL VENOUS BLD VENIPUNCTURE: CPT

## 2021-01-29 PROCEDURE — 99214 OFFICE O/P EST MOD 30 MIN: CPT | Mod: 25,S$GLB,, | Performed by: DERMATOLOGY

## 2021-01-29 PROCEDURE — 1126F PR PAIN SEVERITY QUANTIFIED, NO PAIN PRESENT: ICD-10-PCS | Mod: S$GLB,,, | Performed by: DERMATOLOGY

## 2021-01-29 PROCEDURE — 11900 PR INJECTION INTO SKIN LESIONS, UP TO 7: ICD-10-PCS | Mod: S$GLB,,, | Performed by: DERMATOLOGY

## 2021-01-29 PROCEDURE — 99999 PR PBB SHADOW E&M-EST. PATIENT-LVL II: CPT | Mod: PBBFAC,,, | Performed by: DERMATOLOGY

## 2021-01-29 PROCEDURE — 11900 INJECT SKIN LESIONS </W 7: CPT | Mod: S$GLB,,, | Performed by: DERMATOLOGY

## 2021-01-29 PROCEDURE — 1126F AMNT PAIN NOTED NONE PRSNT: CPT | Mod: S$GLB,,, | Performed by: DERMATOLOGY

## 2021-01-29 PROCEDURE — 99214 PR OFFICE/OUTPT VISIT, EST, LEVL IV, 30-39 MIN: ICD-10-PCS | Mod: 25,S$GLB,, | Performed by: DERMATOLOGY

## 2021-01-29 RX ORDER — HYDROCORTISONE BUTYRATE 1 MG/G
OINTMENT TOPICAL
Qty: 60 G | Refills: 3 | Status: SHIPPED | OUTPATIENT
Start: 2021-01-29 | End: 2021-11-05 | Stop reason: SDUPTHER

## 2021-02-18 ENCOUNTER — PATIENT MESSAGE (OUTPATIENT)
Dept: ADMINISTRATIVE | Facility: HOSPITAL | Age: 56
End: 2021-02-18

## 2021-04-13 ENCOUNTER — PATIENT MESSAGE (OUTPATIENT)
Dept: DERMATOLOGY | Facility: CLINIC | Age: 56
End: 2021-04-13

## 2021-04-14 ENCOUNTER — HOSPITAL ENCOUNTER (OUTPATIENT)
Dept: RADIOLOGY | Facility: HOSPITAL | Age: 56
Discharge: HOME | End: 2021-04-14
Attending: DERMATOLOGY
Payer: COMMERCIAL

## 2021-04-14 DIAGNOSIS — Z79.899 ENCOUNTER FOR LONG-TERM (CURRENT) USE OF HIGH-RISK MEDICATION: ICD-10-CM

## 2021-04-14 PROCEDURE — 71046 X-RAY EXAM CHEST 2 VIEWS: CPT | Mod: 26,,, | Performed by: RADIOLOGY

## 2021-04-14 PROCEDURE — 71046 X-RAY EXAM CHEST 2 VIEWS: CPT | Mod: TC,FY

## 2021-04-14 PROCEDURE — 71046 XR CHEST PA AND LATERAL: ICD-10-PCS | Mod: 26,,, | Performed by: RADIOLOGY

## 2021-04-16 ENCOUNTER — PATIENT MESSAGE (OUTPATIENT)
Dept: RESEARCH | Facility: HOSPITAL | Age: 56
End: 2021-04-16

## 2021-04-21 ENCOUNTER — DOCUMENTATION ONLY (OUTPATIENT)
Dept: DERMATOLOGY | Facility: CLINIC | Age: 56
End: 2021-04-21

## 2021-05-28 DIAGNOSIS — L40.9 PSORIASIS: ICD-10-CM

## 2021-05-28 RX ORDER — IXEKIZUMAB 80 MG/ML
INJECTION, SOLUTION SUBCUTANEOUS
Qty: 1 SYRINGE | Refills: 3 | Status: SHIPPED | OUTPATIENT
Start: 2021-05-28 | End: 2022-08-18

## 2021-07-06 ENCOUNTER — PATIENT MESSAGE (OUTPATIENT)
Dept: ADMINISTRATIVE | Facility: HOSPITAL | Age: 56
End: 2021-07-06

## 2021-10-04 ENCOUNTER — PATIENT MESSAGE (OUTPATIENT)
Dept: ADMINISTRATIVE | Facility: HOSPITAL | Age: 56
End: 2021-10-04

## 2021-11-04 ENCOUNTER — PATIENT OUTREACH (OUTPATIENT)
Dept: ADMINISTRATIVE | Facility: OTHER | Age: 56
End: 2021-11-04
Payer: COMMERCIAL

## 2021-11-05 ENCOUNTER — LAB VISIT (OUTPATIENT)
Dept: LAB | Facility: HOSPITAL | Age: 56
End: 2021-11-05
Attending: DERMATOLOGY
Payer: COMMERCIAL

## 2021-11-05 ENCOUNTER — OFFICE VISIT (OUTPATIENT)
Dept: DERMATOLOGY | Facility: CLINIC | Age: 56
End: 2021-11-05
Payer: COMMERCIAL

## 2021-11-05 DIAGNOSIS — L40.9 PSORIASIS: ICD-10-CM

## 2021-11-05 LAB
BASOPHILS # BLD AUTO: 0.06 K/UL (ref 0–0.2)
BASOPHILS NFR BLD: 0.7 % (ref 0–1.9)
DIFFERENTIAL METHOD: ABNORMAL
EOSINOPHIL # BLD AUTO: 0.3 K/UL (ref 0–0.5)
EOSINOPHIL NFR BLD: 4 % (ref 0–8)
ERYTHROCYTE [DISTWIDTH] IN BLOOD BY AUTOMATED COUNT: 13.2 % (ref 11.5–14.5)
HCT VFR BLD AUTO: 44.5 % (ref 40–54)
HGB BLD-MCNC: 14.9 G/DL (ref 14–18)
IMM GRANULOCYTES # BLD AUTO: 0.01 K/UL (ref 0–0.04)
IMM GRANULOCYTES NFR BLD AUTO: 0.1 % (ref 0–0.5)
LYMPHOCYTES # BLD AUTO: 2.5 K/UL (ref 1–4.8)
LYMPHOCYTES NFR BLD: 31.4 % (ref 18–48)
MCH RBC QN AUTO: 33.4 PG (ref 27–31)
MCHC RBC AUTO-ENTMCNC: 33.5 G/DL (ref 32–36)
MCV RBC AUTO: 100 FL (ref 82–98)
MONOCYTES # BLD AUTO: 0.9 K/UL (ref 0.3–1)
MONOCYTES NFR BLD: 10.5 % (ref 4–15)
NEUTROPHILS # BLD AUTO: 4.3 K/UL (ref 1.8–7.7)
NEUTROPHILS NFR BLD: 53.3 % (ref 38–73)
NRBC BLD-RTO: 0 /100 WBC
PLATELET # BLD AUTO: 176 K/UL (ref 150–450)
PMV BLD AUTO: 11.2 FL (ref 9.2–12.9)
RBC # BLD AUTO: 4.46 M/UL (ref 4.6–6.2)
WBC # BLD AUTO: 8.06 K/UL (ref 3.9–12.7)

## 2021-11-05 PROCEDURE — 1160F PR REVIEW ALL MEDS BY PRESCRIBER/CLIN PHARMACIST DOCUMENTED: ICD-10-PCS | Mod: CPTII,S$GLB,, | Performed by: DERMATOLOGY

## 2021-11-05 PROCEDURE — 1160F RVW MEDS BY RX/DR IN RCRD: CPT | Mod: CPTII,S$GLB,, | Performed by: DERMATOLOGY

## 2021-11-05 PROCEDURE — 85025 COMPLETE CBC W/AUTO DIFF WBC: CPT | Performed by: DERMATOLOGY

## 2021-11-05 PROCEDURE — 99999 PR PBB SHADOW E&M-EST. PATIENT-LVL II: ICD-10-PCS | Mod: PBBFAC,,, | Performed by: DERMATOLOGY

## 2021-11-05 PROCEDURE — 1159F PR MEDICATION LIST DOCUMENTED IN MEDICAL RECORD: ICD-10-PCS | Mod: CPTII,S$GLB,, | Performed by: DERMATOLOGY

## 2021-11-05 PROCEDURE — 99999 PR PBB SHADOW E&M-EST. PATIENT-LVL II: CPT | Mod: PBBFAC,,, | Performed by: DERMATOLOGY

## 2021-11-05 PROCEDURE — 1159F MED LIST DOCD IN RCRD: CPT | Mod: CPTII,S$GLB,, | Performed by: DERMATOLOGY

## 2021-11-05 PROCEDURE — 99214 OFFICE O/P EST MOD 30 MIN: CPT | Mod: S$GLB,,, | Performed by: DERMATOLOGY

## 2021-11-05 PROCEDURE — 99214 PR OFFICE/OUTPT VISIT, EST, LEVL IV, 30-39 MIN: ICD-10-PCS | Mod: S$GLB,,, | Performed by: DERMATOLOGY

## 2021-11-05 PROCEDURE — 36415 COLL VENOUS BLD VENIPUNCTURE: CPT | Performed by: DERMATOLOGY

## 2021-11-05 RX ORDER — HYDROCORTISONE BUTYRATE 1 MG/G
OINTMENT TOPICAL
Qty: 60 G | Refills: 3 | Status: SHIPPED | OUTPATIENT
Start: 2021-11-05 | End: 2022-06-10 | Stop reason: ALTCHOICE

## 2021-11-15 ENCOUNTER — TELEPHONE (OUTPATIENT)
Dept: PHARMACY | Facility: CLINIC | Age: 56
End: 2021-11-15
Payer: COMMERCIAL

## 2021-11-24 ENCOUNTER — TELEPHONE (OUTPATIENT)
Dept: PHARMACY | Facility: CLINIC | Age: 56
End: 2021-11-24
Payer: COMMERCIAL

## 2021-12-16 ENCOUNTER — DOCUMENTATION ONLY (OUTPATIENT)
Dept: DERMATOLOGY | Facility: CLINIC | Age: 56
End: 2021-12-16
Payer: COMMERCIAL

## 2022-01-14 ENCOUNTER — PATIENT MESSAGE (OUTPATIENT)
Dept: DERMATOLOGY | Facility: CLINIC | Age: 57
End: 2022-01-14
Payer: COMMERCIAL

## 2022-01-21 DIAGNOSIS — L40.9 PSORIASIS: ICD-10-CM

## 2022-01-25 ENCOUNTER — PATIENT MESSAGE (OUTPATIENT)
Dept: ADMINISTRATIVE | Facility: HOSPITAL | Age: 57
End: 2022-01-25
Payer: COMMERCIAL

## 2022-02-02 ENCOUNTER — PATIENT OUTREACH (OUTPATIENT)
Dept: ADMINISTRATIVE | Facility: HOSPITAL | Age: 57
End: 2022-02-02
Payer: COMMERCIAL

## 2022-02-04 ENCOUNTER — TELEPHONE (OUTPATIENT)
Dept: DERMATOLOGY | Facility: CLINIC | Age: 57
End: 2022-02-04
Payer: COMMERCIAL

## 2022-02-04 NOTE — TELEPHONE ENCOUNTER
----- Message from Leda Dubose LPN sent at 2/3/2022 11:11 AM CST -----  Regarding: FW: Tammie Blank    ----- Message -----  From: Minoo Vazquez  Sent: 2/3/2022  11:09 AM CST  To: Charlie Rice Staff  Subject: Tammie Blank                                  JAM - pt - pharmacy is calling to speak with the nurse to the pts prescription for Taltz the nurse needs to call [facility] pharmacy phone number [phone] to provide them with a new prescription for Taltz can you please call Tammie at [phone].    IVY

## 2022-02-09 DIAGNOSIS — L40.9 PSORIASIS: Primary | ICD-10-CM

## 2022-02-09 RX ORDER — IXEKIZUMAB 80 MG/ML
INJECTION, SOLUTION SUBCUTANEOUS
Qty: 8 ML | Refills: 0 | Status: SHIPPED | OUTPATIENT
Start: 2022-02-09 | End: 2022-08-18

## 2022-02-10 ENCOUNTER — TELEPHONE (OUTPATIENT)
Dept: DERMATOLOGY | Facility: CLINIC | Age: 57
End: 2022-02-10
Payer: COMMERCIAL

## 2022-02-10 NOTE — TELEPHONE ENCOUNTER
----- Message from Nicole Zamora MA sent at 2/10/2022  3:11 PM CST -----    ----- Message -----  From: Magy Pugh  Sent: 2/10/2022  12:21 PM CST  To: Charlie Rice Staff    Pharmacy Calling to Clarify an RX    Name of Caller:Kalani    [facility] Name: [facility]    Prescription Name:TALTZ AUTOINJECTOR, 3 PACK, 80 mg/mL AtIn    What do they need to clarify?: need to clarify directions    Best Call Back Number:  [facility] - [address] [place] [address]   Phone:[phone]  Fax: [phone]

## 2022-03-03 ENCOUNTER — TELEPHONE (OUTPATIENT)
Dept: DERMATOLOGY | Facility: CLINIC | Age: 57
End: 2022-03-03
Payer: COMMERCIAL

## 2022-03-03 NOTE — TELEPHONE ENCOUNTER
Spoke with lesley with [facility], provided with insurance inform needed for the patient can receive medication. maintenance inform was already proved 2/10 . Once medication is ready they will contact pt     ----- Message from Delmy Polanco LPN sent at 3/2/2022  1:37 PM CST -----  Contact: [phone]    ----- Message -----  From: Amy Kerns  Sent: 3/2/2022   1:33 PM CST  To: Charlie Rice Staff    Tima Altamirano with [facility] specialty pharmacy needing a verbal for the TALTZ AUTOINJECTOR, 3 PACK, 80 mg/mL AtIn. Pt still has not gotten this medication. [facility] needs some clarification for the medication and maintenance. Tima would like a call back.    [phone]

## 2022-03-10 ENCOUNTER — TELEPHONE (OUTPATIENT)
Dept: DERMATOLOGY | Facility: CLINIC | Age: 57
End: 2022-03-10
Payer: COMMERCIAL

## 2022-03-10 NOTE — TELEPHONE ENCOUNTER
Spoke with matt with [facility] and they need verbally give her the quantity for (1) and maintenance dose with 2 refil    ----- Message from Leda Dubose LPN sent at 3/10/2022  8:18 AM CST -----  Regarding: FW: Prescription Inquiry  Is this on his order?  Sometimes they miss it.  ----- Message -----  From: Patricia Mosqueda MA  Sent: 3/9/2022   4:41 PM CST  To: Leda Dubose LPN  Subject: FW: Prescription Inquiry                           ----- Message -----  From: Elaine Barbosa  Sent: 3/9/2022   3:40 PM CST  To: Charlie Rice Staff  Subject: Prescription Inquiry                             Regarding:Deanne called about TALTZ AUTOINJECTOR, 3 PACK, 80 mg/mL AtIn they need to obtain quantity for week 12 and the maintenance  dose.       Requesting Call back number: [phone] invoice [id number]

## 2022-03-16 ENCOUNTER — PATIENT MESSAGE (OUTPATIENT)
Dept: ADMINISTRATIVE | Facility: HOSPITAL | Age: 57
End: 2022-03-16
Payer: COMMERCIAL

## 2022-03-21 ENCOUNTER — TELEPHONE (OUTPATIENT)
Dept: DERMATOLOGY | Facility: CLINIC | Age: 57
End: 2022-03-21
Payer: COMMERCIAL

## 2022-03-21 NOTE — TELEPHONE ENCOUNTER
Spoke with josé miguel                                                                                                                                                                                                                                                                                                                                                                                                                                                                                                                                                        ----- Message from Leda Dubose LPN sent at 3/21/2022  9:51 AM CDT -----  Contact: Nicole ([facility]) [phone]    ----- Message -----  From: Piter Marvin  Sent: 3/21/2022   9:48 AM CDT  To: Charlie Rice Staff    Caller requesting a call back regarding prior authorization for (TALTZ AUTOINJECTOR, 3 PACK, 80 mg/mL AtIn).  Please call to discuss further.  [phone] to call if no prior authorization is on file.      [address] [address]  Phone: [phone] Fax: [phone]

## 2022-03-23 ENCOUNTER — TELEPHONE (OUTPATIENT)
Dept: DERMATOLOGY | Facility: CLINIC | Age: 57
End: 2022-03-23
Payer: COMMERCIAL

## 2022-03-23 NOTE — TELEPHONE ENCOUNTER
Spoke with pt, pt stated that he talked with Taltz and they are sending over RX.    Spoke with [facility] via fax from 3/21 that pt PA that was sent by me on 3/21 has not been processed because coverage review is handled by another organization. I was then transferred over to RX benefits ([phone])  and was told that the new PA form will be faxed     ----- Message from Delmy Polanco LPN sent at 3/22/2022  5:26 PM CDT -----  Contact: Patient    ----- Message -----  From: Katie Weldon  Sent: 3/22/2022   2:41 PM CDT  To: Charlie Rice Staff    Patient calling in regards to RX: TALTZ AUTOINJECTOR, 3 PACK, 80 mg/mL AtIn. Patient stated that an prior authorization is needed. Requesting call back.      Patient  @[phone] (home)

## 2022-03-25 ENCOUNTER — TELEPHONE (OUTPATIENT)
Dept: DERMATOLOGY | Facility: CLINIC | Age: 57
End: 2022-03-25
Payer: COMMERCIAL

## 2022-03-25 NOTE — TELEPHONE ENCOUNTER
Spoke with [facility] stated that the pt was approved 3/23/22-4/23/23 and that nothing was needed and they will updat patient chart       ----- Message from Delmy Polanco LPN sent at 3/25/2022 12:28 PM CDT -----    ----- Message -----  From: Nicho Buitrago  Sent: 3/25/2022  10:16 AM CDT  To: Charlie Rice Staff    Parul w/ [facility] called to speak w/ someone in regards to needing a prior authorization resubmitted due to his prescription being submitted over 48 business hours ago.    [facility] - [address] [address]  Phone: [phone] Fax: [phone]

## 2022-04-12 ENCOUNTER — TELEPHONE (OUTPATIENT)
Dept: DERMATOLOGY | Facility: CLINIC | Age: 57
End: 2022-04-12
Payer: COMMERCIAL

## 2022-04-12 NOTE — TELEPHONE ENCOUNTER
"Spoke with [facility], stated that "plan limit exceeded and can not do anything and for me to call  [phone] to see whats going on    Spoke with mookie with , inform her with "plan limit exceeded" cost exceed amount , RX was more . PA need for for cost exceed. [email or web address]  Patient member id# [id number]  done    ----- Message from Ldea Dubose LPN sent at 4/11/2022  1:40 PM CDT -----  Contact: Nadege ([facility])    ----- Message -----  From: Samanta Manley  Sent: 4/11/2022  12:03 PM CDT  To: Charlie Rice Staff    Nadege from [facility] calling to inform staff of plan limitations, per pt's ins plan for Rx below. Pls contact [facility] using number provider below re: Rx below.    ixekizumab (TALTZ AUTOINJECTOR) 80 mg/mL AtIn [750313]    Reference Number:[id number]    [employer] [phone]    Confirmed contact info below:  Contact Name: Nadege   Phone Number: [phone] opt 3                   "

## 2022-04-26 ENCOUNTER — TELEPHONE (OUTPATIENT)
Dept: DERMATOLOGY | Facility: CLINIC | Age: 57
End: 2022-04-26
Payer: COMMERCIAL

## 2022-04-26 NOTE — TELEPHONE ENCOUNTER
Spoke to [facility] verbal Rx request for pt-pt is still on Induction dose-C 80mg/mL TALTZ auto injector, will be shipped for week 4,6,8,10- to inject 80mg/mL every two weeks. Separate Rx will be for 1 inj to inject on week 12-80mg/mL injection.  Then provider will e-prescribe maintenance dose for 80mg/mL every 4 weeks afterwards.

## 2022-05-02 ENCOUNTER — DOCUMENTATION ONLY (OUTPATIENT)
Dept: DERMATOLOGY | Facility: CLINIC | Age: 57
End: 2022-05-02
Payer: COMMERCIAL

## 2022-05-09 ENCOUNTER — TELEPHONE (OUTPATIENT)
Dept: INTERNAL MEDICINE | Facility: CLINIC | Age: 57
End: 2022-05-09
Payer: COMMERCIAL

## 2022-05-09 NOTE — TELEPHONE ENCOUNTER
I spoke to pt, his last  annual appt was 6-9-2020.  Annual visit scheduled 6-10-22 at 8 am.  I will mail out an appt reminder. Pt verbalized understanding

## 2022-05-09 NOTE — TELEPHONE ENCOUNTER
----- Message from Poncho Tolbert sent at 5/9/2022  3:05 PM CDT -----  Good Afternoon,  Patient: MERT SANCHEZ [[id number]] is requesting a call please. (Pt is requesting a referral for a colonoscopy). Thank you

## 2022-05-10 ENCOUNTER — TELEPHONE (OUTPATIENT)
Dept: INTERNAL MEDICINE | Facility: CLINIC | Age: 57
End: 2022-05-10
Payer: COMMERCIAL

## 2022-05-10 DIAGNOSIS — Z00.00 WELL ADULT EXAM: Primary | ICD-10-CM

## 2022-05-10 DIAGNOSIS — Z12.5 PROSTATE CANCER SCREENING: ICD-10-CM

## 2022-05-10 DIAGNOSIS — L40.9 PSORIASIS: ICD-10-CM

## 2022-05-10 DIAGNOSIS — E78.5 HYPERLIPIDEMIA, UNSPECIFIED HYPERLIPIDEMIA TYPE: ICD-10-CM

## 2022-05-18 DIAGNOSIS — Z79.899 ENCOUNTER FOR LONG-TERM (CURRENT) USE OF HIGH-RISK MEDICATION: Primary | ICD-10-CM

## 2022-05-23 ENCOUNTER — HOSPITAL ENCOUNTER (OUTPATIENT)
Dept: RADIOLOGY | Facility: HOSPITAL | Age: 57
Discharge: HOME | End: 2022-05-23
Attending: DERMATOLOGY
Payer: COMMERCIAL

## 2022-05-23 DIAGNOSIS — Z79.899 ENCOUNTER FOR LONG-TERM (CURRENT) USE OF HIGH-RISK MEDICATION: ICD-10-CM

## 2022-05-23 PROCEDURE — 71046 X-RAY EXAM CHEST 2 VIEWS: CPT | Mod: TC,FY,PO

## 2022-05-23 PROCEDURE — 71046 XR CHEST PA AND LATERAL: ICD-10-PCS | Mod: 26,,, | Performed by: RADIOLOGY

## 2022-05-23 PROCEDURE — 71046 X-RAY EXAM CHEST 2 VIEWS: CPT | Mod: 26,,, | Performed by: RADIOLOGY

## 2022-06-01 ENCOUNTER — LAB VISIT (OUTPATIENT)
Dept: LAB | Facility: HOSPITAL | Age: 57
End: 2022-06-01
Attending: FAMILY MEDICINE
Payer: COMMERCIAL

## 2022-06-01 DIAGNOSIS — Z00.00 WELL ADULT EXAM: ICD-10-CM

## 2022-06-01 DIAGNOSIS — Z12.5 PROSTATE CANCER SCREENING: ICD-10-CM

## 2022-06-01 DIAGNOSIS — E78.5 HYPERLIPIDEMIA, UNSPECIFIED HYPERLIPIDEMIA TYPE: ICD-10-CM

## 2022-06-01 LAB
25(OH)D3+25(OH)D2 SERPL-MCNC: 28 NG/ML (ref 30–96)
ALBUMIN SERPL BCP-MCNC: 4.1 G/DL (ref 3.5–5.2)
ALP SERPL-CCNC: 56 U/L (ref 55–135)
ALT SERPL W/O P-5'-P-CCNC: 22 U/L (ref 10–44)
ANION GAP SERPL CALC-SCNC: 7 MMOL/L (ref 8–16)
AST SERPL-CCNC: 22 U/L (ref 10–40)
BASOPHILS # BLD AUTO: 0.06 K/UL (ref 0–0.2)
BASOPHILS NFR BLD: 1 % (ref 0–1.9)
BILIRUB SERPL-MCNC: 0.5 MG/DL (ref 0.1–1)
BUN SERPL-MCNC: 17 MG/DL (ref 6–20)
CALCIUM SERPL-MCNC: 9 MG/DL (ref 8.7–10.5)
CHLORIDE SERPL-SCNC: 108 MMOL/L (ref 95–110)
CHOLEST SERPL-MCNC: 232 MG/DL (ref 120–199)
CHOLEST/HDLC SERPL: 3.9 {RATIO} (ref 2–5)
CO2 SERPL-SCNC: 24 MMOL/L (ref 23–29)
COMPLEXED PSA SERPL-MCNC: 0.71 NG/ML (ref 0–4)
CREAT SERPL-MCNC: 0.8 MG/DL (ref 0.5–1.4)
DIFFERENTIAL METHOD: ABNORMAL
EOSINOPHIL # BLD AUTO: 0.4 K/UL (ref 0–0.5)
EOSINOPHIL NFR BLD: 6.6 % (ref 0–8)
ERYTHROCYTE [DISTWIDTH] IN BLOOD BY AUTOMATED COUNT: 13.4 % (ref 11.5–14.5)
EST. GFR  (AFRICAN AMERICAN): >60 ML/MIN/1.73 M^2
EST. GFR  (NON AFRICAN AMERICAN): >60 ML/MIN/1.73 M^2
ESTIMATED AVG GLUCOSE: 117 MG/DL (ref 68–131)
GLUCOSE SERPL-MCNC: 110 MG/DL (ref 70–110)
HBA1C MFR BLD: 5.7 % (ref 4–5.6)
HCT VFR BLD AUTO: 45.1 % (ref 40–54)
HDLC SERPL-MCNC: 60 MG/DL (ref 40–75)
HDLC SERPL: 25.9 % (ref 20–50)
HGB BLD-MCNC: 14.8 G/DL (ref 14–18)
IMM GRANULOCYTES # BLD AUTO: 0.01 K/UL (ref 0–0.04)
IMM GRANULOCYTES NFR BLD AUTO: 0.2 % (ref 0–0.5)
LDLC SERPL CALC-MCNC: 142.4 MG/DL (ref 63–159)
LYMPHOCYTES # BLD AUTO: 2.4 K/UL (ref 1–4.8)
LYMPHOCYTES NFR BLD: 38.2 % (ref 18–48)
MCH RBC QN AUTO: 33.6 PG (ref 27–31)
MCHC RBC AUTO-ENTMCNC: 32.8 G/DL (ref 32–36)
MCV RBC AUTO: 103 FL (ref 82–98)
MONOCYTES # BLD AUTO: 0.5 K/UL (ref 0.3–1)
MONOCYTES NFR BLD: 8.3 % (ref 4–15)
NEUTROPHILS # BLD AUTO: 2.9 K/UL (ref 1.8–7.7)
NEUTROPHILS NFR BLD: 45.7 % (ref 38–73)
NONHDLC SERPL-MCNC: 172 MG/DL
NRBC BLD-RTO: 0 /100 WBC
PLATELET # BLD AUTO: 187 K/UL (ref 150–450)
PMV BLD AUTO: 11.4 FL (ref 9.2–12.9)
POTASSIUM SERPL-SCNC: 4.5 MMOL/L (ref 3.5–5.1)
PROT SERPL-MCNC: 6.8 G/DL (ref 6–8.4)
RBC # BLD AUTO: 4.4 M/UL (ref 4.6–6.2)
SODIUM SERPL-SCNC: 139 MMOL/L (ref 136–145)
T4 FREE SERPL-MCNC: 0.88 NG/DL (ref 0.71–1.51)
TRIGL SERPL-MCNC: 148 MG/DL (ref 30–150)
TSH SERPL DL<=0.005 MIU/L-ACNC: 1.7 UIU/ML (ref 0.4–4)
WBC # BLD AUTO: 6.23 K/UL (ref 3.9–12.7)

## 2022-06-01 PROCEDURE — 82306 VITAMIN D 25 HYDROXY: CPT | Performed by: FAMILY MEDICINE

## 2022-06-01 PROCEDURE — 84153 ASSAY OF PSA TOTAL: CPT | Performed by: FAMILY MEDICINE

## 2022-06-01 PROCEDURE — 84443 ASSAY THYROID STIM HORMONE: CPT | Performed by: FAMILY MEDICINE

## 2022-06-01 PROCEDURE — 85025 COMPLETE CBC W/AUTO DIFF WBC: CPT | Performed by: FAMILY MEDICINE

## 2022-06-01 PROCEDURE — 83036 HEMOGLOBIN GLYCOSYLATED A1C: CPT | Performed by: FAMILY MEDICINE

## 2022-06-01 PROCEDURE — 84439 ASSAY OF FREE THYROXINE: CPT | Performed by: FAMILY MEDICINE

## 2022-06-01 PROCEDURE — 80053 COMPREHEN METABOLIC PANEL: CPT | Performed by: FAMILY MEDICINE

## 2022-06-01 PROCEDURE — 80061 LIPID PANEL: CPT | Performed by: FAMILY MEDICINE

## 2022-06-01 PROCEDURE — 36415 COLL VENOUS BLD VENIPUNCTURE: CPT | Mod: PO | Performed by: FAMILY MEDICINE

## 2022-06-10 ENCOUNTER — OFFICE VISIT (OUTPATIENT)
Dept: INTERNAL MEDICINE | Facility: CLINIC | Age: 57
End: 2022-06-10
Payer: COMMERCIAL

## 2022-06-10 VITALS
HEART RATE: 60 BPM | OXYGEN SATURATION: 99 % | BODY MASS INDEX: 27.53 KG/M2 | HEIGHT: 66 IN | WEIGHT: 171.31 LBS | RESPIRATION RATE: 17 BRPM | DIASTOLIC BLOOD PRESSURE: 85 MMHG | SYSTOLIC BLOOD PRESSURE: 135 MMHG | TEMPERATURE: 98 F

## 2022-06-10 DIAGNOSIS — Z00.00 WELL ADULT EXAM: Primary | ICD-10-CM

## 2022-06-10 DIAGNOSIS — Z23 NEED FOR PNEUMOCOCCAL VACCINATION: ICD-10-CM

## 2022-06-10 DIAGNOSIS — Z12.11 COLON CANCER SCREENING: ICD-10-CM

## 2022-06-10 DIAGNOSIS — F17.200 SMOKER: ICD-10-CM

## 2022-06-10 DIAGNOSIS — L40.9 PSORIASIS: ICD-10-CM

## 2022-06-10 PROCEDURE — 99999 PR PBB SHADOW E&M-EST. PATIENT-LVL IV: CPT | Mod: PBBFAC,,, | Performed by: FAMILY MEDICINE

## 2022-06-10 PROCEDURE — 1160F RVW MEDS BY RX/DR IN RCRD: CPT | Mod: CPTII,S$GLB,, | Performed by: FAMILY MEDICINE

## 2022-06-10 PROCEDURE — 90677 PNEUMOCOCCAL CONJUGATE VACCINE 20-VALENT: ICD-10-PCS | Mod: S$GLB,,, | Performed by: FAMILY MEDICINE

## 2022-06-10 PROCEDURE — 3075F PR MOST RECENT SYSTOLIC BLOOD PRESS GE 130-139MM HG: ICD-10-PCS | Mod: CPTII,S$GLB,, | Performed by: FAMILY MEDICINE

## 2022-06-10 PROCEDURE — 1159F MED LIST DOCD IN RCRD: CPT | Mod: CPTII,S$GLB,, | Performed by: FAMILY MEDICINE

## 2022-06-10 PROCEDURE — 1159F PR MEDICATION LIST DOCUMENTED IN MEDICAL RECORD: ICD-10-PCS | Mod: CPTII,S$GLB,, | Performed by: FAMILY MEDICINE

## 2022-06-10 PROCEDURE — 3079F DIAST BP 80-89 MM HG: CPT | Mod: CPTII,S$GLB,, | Performed by: FAMILY MEDICINE

## 2022-06-10 PROCEDURE — 3044F PR MOST RECENT HEMOGLOBIN A1C LEVEL <7.0%: ICD-10-PCS | Mod: CPTII,S$GLB,, | Performed by: FAMILY MEDICINE

## 2022-06-10 PROCEDURE — 1160F PR REVIEW ALL MEDS BY PRESCRIBER/CLIN PHARMACIST DOCUMENTED: ICD-10-PCS | Mod: CPTII,S$GLB,, | Performed by: FAMILY MEDICINE

## 2022-06-10 PROCEDURE — 3075F SYST BP GE 130 - 139MM HG: CPT | Mod: CPTII,S$GLB,, | Performed by: FAMILY MEDICINE

## 2022-06-10 PROCEDURE — 90471 IMMUNIZATION ADMIN: CPT | Mod: S$GLB,,, | Performed by: FAMILY MEDICINE

## 2022-06-10 PROCEDURE — 3008F PR BODY MASS INDEX (BMI) DOCUMENTED: ICD-10-PCS | Mod: CPTII,S$GLB,, | Performed by: FAMILY MEDICINE

## 2022-06-10 PROCEDURE — 90471 PNEUMOCOCCAL CONJUGATE VACCINE 20-VALENT: ICD-10-PCS | Mod: S$GLB,,, | Performed by: FAMILY MEDICINE

## 2022-06-10 PROCEDURE — 99396 PREV VISIT EST AGE 40-64: CPT | Mod: 25,S$GLB,, | Performed by: FAMILY MEDICINE

## 2022-06-10 PROCEDURE — 3079F PR MOST RECENT DIASTOLIC BLOOD PRESSURE 80-89 MM HG: ICD-10-PCS | Mod: CPTII,S$GLB,, | Performed by: FAMILY MEDICINE

## 2022-06-10 PROCEDURE — 99999 PR PBB SHADOW E&M-EST. PATIENT-LVL IV: ICD-10-PCS | Mod: PBBFAC,,, | Performed by: FAMILY MEDICINE

## 2022-06-10 PROCEDURE — 99396 PR PREVENTIVE VISIT,EST,40-64: ICD-10-PCS | Mod: 25,S$GLB,, | Performed by: FAMILY MEDICINE

## 2022-06-10 PROCEDURE — 3008F BODY MASS INDEX DOCD: CPT | Mod: CPTII,S$GLB,, | Performed by: FAMILY MEDICINE

## 2022-06-10 PROCEDURE — 90677 PCV20 VACCINE IM: CPT | Mod: S$GLB,,, | Performed by: FAMILY MEDICINE

## 2022-06-10 PROCEDURE — 3044F HG A1C LEVEL LT 7.0%: CPT | Mod: CPTII,S$GLB,, | Performed by: FAMILY MEDICINE

## 2022-06-10 NOTE — PROGRESS NOTES
Subjective:       Patient ID: Jerry Turner is a 56 y.o. male.    Chief Complaint: Annual Exam    HPI 56-year-old Sami male presents to clinic today for annual physical exam.  He continues to be followed by dermatology for treatment of psoriasis which remains stable on Taltz.  He reports a past surgical history of dental implant and spine surgery.  He reports a family history of his mother having breast cancer and diabetes.  Pneumonia vaccination has been discussed and Prevnar 20 given.  Colonoscopy has been ordered.  Review of Systems   Constitutional: Negative for appetite change, chills, fatigue and fever.   HENT: Negative for nasal congestion, ear pain, hearing loss, postnasal drip, rhinorrhea, sinus pressure/congestion, sore throat and tinnitus.    Eyes: Negative for redness, itching and visual disturbance.   Respiratory: Negative for cough, chest tightness and shortness of breath.    Cardiovascular: Negative for chest pain and palpitations.   Gastrointestinal: Negative for abdominal pain, constipation, diarrhea, nausea and vomiting.   Genitourinary: Negative for decreased urine volume, difficulty urinating, dysuria, frequency, hematuria and urgency.   Musculoskeletal: Negative for back pain, myalgias, neck pain and neck stiffness.   Integumentary:  Negative for rash.   Neurological: Negative for dizziness, light-headedness and headaches.   Psychiatric/Behavioral: Negative.          Objective:      Physical Exam  Vitals and nursing note reviewed.   Constitutional:       General: He is not in acute distress.     Appearance: He is well-developed. He is not diaphoretic.   HENT:      Head: Normocephalic and atraumatic.      Right Ear: External ear normal.      Left Ear: External ear normal.      Nose: Nose normal.      Mouth/Throat:      Pharynx: No oropharyngeal exudate.   Eyes:      General: No scleral icterus.        Right eye: No discharge.         Left eye: No discharge.      Conjunctiva/sclera: Conjunctivae  normal.      Pupils: Pupils are equal, round, and reactive to light.   Neck:      Thyroid: No thyromegaly.      Vascular: No JVD.      Trachea: No tracheal deviation.   Cardiovascular:      Rate and Rhythm: Normal rate and regular rhythm.      Heart sounds: Normal heart sounds. No murmur heard.    No friction rub. No gallop.   Pulmonary:      Effort: Pulmonary effort is normal. No respiratory distress.      Breath sounds: Normal breath sounds. No stridor. No wheezing or rales.   Abdominal:      General: Bowel sounds are normal. There is no distension.      Palpations: Abdomen is soft. There is no mass.      Tenderness: There is no abdominal tenderness. There is no guarding or rebound.   Musculoskeletal:         General: No tenderness. Normal range of motion.      Cervical back: Normal range of motion and neck supple.   Lymphadenopathy:      Cervical: No cervical adenopathy.   Skin:     General: Skin is warm and dry.      Coloration: Skin is not pale.      Findings: No erythema or rash.   Neurological:      Mental Status: He is alert and oriented to person, place, and time.   Psychiatric:         Behavior: Behavior normal.         Thought Content: Thought content normal.         Judgment: Judgment normal.         Assessment:       Problem List Items Addressed This Visit     Psoriasis    Smoker      Other Visit Diagnoses     Well adult exam    -  Primary    Need for pneumococcal vaccination        Relevant Orders    (In Office Administered) Pneumococcal Conjugate Vaccine (20 Valent) (IM)    Colon cancer screening        Relevant Orders    Case Request Endoscopy: COLONOSCOPY (Completed)          Plan:       1. Labs have been reviewed and are overall within normal limits.  2. Continue Taltz as prescribed and continue follow-up with dermatology as scheduled.  Psoriasis is stable.  3. Smoking cessation discussed.  4. Prevnar 20 given.  5. Screening colonoscopy.  6. Return to clinic as needed or in 1 year for annual  physical exam.

## 2022-06-20 DIAGNOSIS — Z12.11 SPECIAL SCREENING FOR MALIGNANT NEOPLASMS, COLON: Primary | ICD-10-CM

## 2022-06-20 RX ORDER — POLYETHYLENE GLYCOL 3350, SODIUM SULFATE ANHYDROUS, SODIUM BICARBONATE, SODIUM CHLORIDE, POTASSIUM CHLORIDE 236; 22.74; 6.74; 5.86; 2.97 G/4L; G/4L; G/4L; G/4L; G/4L
4 POWDER, FOR SOLUTION ORAL ONCE
Qty: 4000 ML | Refills: 0 | Status: SHIPPED | OUTPATIENT
Start: 2022-06-20 | End: 2022-06-20

## 2022-07-25 ENCOUNTER — TELEPHONE (OUTPATIENT)
Dept: DERMATOLOGY | Facility: CLINIC | Age: 57
End: 2022-07-25

## 2022-07-25 ENCOUNTER — OFFICE VISIT (OUTPATIENT)
Dept: DERMATOLOGY | Facility: CLINIC | Age: 57
End: 2022-07-25
Payer: COMMERCIAL

## 2022-07-25 DIAGNOSIS — L40.9 PSORIASIS: ICD-10-CM

## 2022-07-25 PROCEDURE — 1160F PR REVIEW ALL MEDS BY PRESCRIBER/CLIN PHARMACIST DOCUMENTED: ICD-10-PCS | Mod: CPTII,S$GLB,, | Performed by: DERMATOLOGY

## 2022-07-25 PROCEDURE — 99214 OFFICE O/P EST MOD 30 MIN: CPT | Mod: S$GLB,,, | Performed by: DERMATOLOGY

## 2022-07-25 PROCEDURE — 1160F RVW MEDS BY RX/DR IN RCRD: CPT | Mod: CPTII,S$GLB,, | Performed by: DERMATOLOGY

## 2022-07-25 PROCEDURE — 99999 PR PBB SHADOW E&M-EST. PATIENT-LVL II: ICD-10-PCS | Mod: PBBFAC,,, | Performed by: DERMATOLOGY

## 2022-07-25 PROCEDURE — 1159F PR MEDICATION LIST DOCUMENTED IN MEDICAL RECORD: ICD-10-PCS | Mod: CPTII,S$GLB,, | Performed by: DERMATOLOGY

## 2022-07-25 PROCEDURE — 3044F PR MOST RECENT HEMOGLOBIN A1C LEVEL <7.0%: ICD-10-PCS | Mod: CPTII,S$GLB,, | Performed by: DERMATOLOGY

## 2022-07-25 PROCEDURE — 1159F MED LIST DOCD IN RCRD: CPT | Mod: CPTII,S$GLB,, | Performed by: DERMATOLOGY

## 2022-07-25 PROCEDURE — 99999 PR PBB SHADOW E&M-EST. PATIENT-LVL II: CPT | Mod: PBBFAC,,, | Performed by: DERMATOLOGY

## 2022-07-25 PROCEDURE — 3044F HG A1C LEVEL LT 7.0%: CPT | Mod: CPTII,S$GLB,, | Performed by: DERMATOLOGY

## 2022-07-25 PROCEDURE — 99214 PR OFFICE/OUTPT VISIT, EST, LEVL IV, 30-39 MIN: ICD-10-PCS | Mod: S$GLB,,, | Performed by: DERMATOLOGY

## 2022-07-25 RX ORDER — TRIAMCINOLONE ACETONIDE 1 MG/G
CREAM TOPICAL
Qty: 45 G | Refills: 0 | Status: SHIPPED | OUTPATIENT
Start: 2022-07-25 | End: 2023-12-07

## 2022-07-25 NOTE — ASSESSMENT & PLAN NOTE
Today's Plan:        Cont Taltz at 80mg SQ q month  Intralesional Kenalog 5mg/cc (1.0 cc total) injected into 1 lesions on the left scrotum today after obtaining verbal consent including risk of surrounding hypopigmentation. Patient tolerated procedure well.    Units: 1  NDC for Kenalog 10mg/cc:  0490-1215-81  TAC cream to scrotum area bid x 1 month consecutively then break

## 2022-07-25 NOTE — PROGRESS NOTES
Subjective:       Patient ID:  Jerry Turner is a 56 y.o. male who presents for   Chief Complaint   Patient presents with    Psoriasis     Psoriasis - Follow-up  Symptom course: improving  Currently using: last taltz injection 7/12/22 and has been using child's TAC cream to scrotum which is no longer itching   Affected locations: groin  Signs / symptoms: itching and redness      Review of Systems   Constitutional: Negative for fever, weight loss and weight gain.   HENT: Negative for mouth sores (no tongue whiteness).    Respiratory: Negative for cough and shortness of breath.         + smokes 1/2 ppd   Gastrointestinal: Negative for nausea, vomiting, abdominal pain and diarrhea.   Musculoskeletal: Positive for arthralgias (gout).   Skin: Positive for rash. Negative for itching.           no ISR        Objective:    Physical Exam   Constitutional: He appears well-developed and well-nourished. No distress.   Genitourinary:         Neurological: He is alert and oriented to person, place, and time. He is not disoriented.   Psychiatric: He has a normal mood and affect.   Skin:   Areas Examined (abnormalities noted in diagram):   Genitals / Buttocks / Groin Inspection Performed  Nails and Digits Inspection Performed             Diagram Legend     Erythematous scaling macule/papule c/w actinic keratosis       Vascular papule c/w angioma      Pigmented verrucoid papule/plaque c/w seborrheic keratosis      Yellow umbilicated papule c/w sebaceous hyperplasia      Irregularly shaped tan macule c/w lentigo     1-2 mm smooth white papules consistent with Milia      Movable subcutaneous cyst with punctum c/w epidermal inclusion cyst      Subcutaneous movable cyst c/w pilar cyst      Firm pink to brown papule c/w dermatofibroma      Pedunculated fleshy papule(s) c/w skin tag(s)      Evenly pigmented macule c/w junctional nevus     Mildly variegated pigmented, slightly irregular-bordered macule c/w mildly atypical nevus      Flesh  colored to evenly pigmented papule c/w intradermal nevus       Pink pearly papule/plaque c/w basal cell carcinoma      Erythematous hyperkeratotic cursted plaque c/w SCC      Surgical scar with no sign of skin cancer recurrence      Open and closed comedones      Inflammatory papules and pustules      Verrucoid papule consistent consistent with wart     Erythematous eczematous patches and plaques     Dystrophic onycholytic nail with subungual debris c/w onychomycosis     Umbilicated papule    Erythematous-base heme-crusted tan verrucoid plaque consistent with inflamed seborrheic keratosis     Erythematous Silvery Scaling Plaque c/w Psoriasis     See annotation  Lab Results   Component Value Date    WBC 6.23 06/01/2022    HGB 14.8 06/01/2022    HCT 45.1 06/01/2022     (H) 06/01/2022     06/01/2022 5/22 CXR nl    Assessment / Plan:        Psoriasis  -     triamcinolone acetonide 0.1% (KENALOG) 0.1 % cream; AAAscrotum bid x no more than 1 month consecutively  Dispense: 45 g; Refill: 0      Psoriasis  Today's Plan:        Cont Taltz at 80mg SQ q month  Intralesional Kenalog 5mg/cc (1.0 cc total) injected into 1 lesions on the left scrotum today after obtaining verbal consent including risk of surrounding hypopigmentation. Patient tolerated procedure well.    Units: 1  NDC for Kenalog 10mg/cc:  9696-5898-23  TAC cream to scrotum area bid x 1 month consecutively then break        No follow-ups on file.

## 2022-07-25 NOTE — TELEPHONE ENCOUNTER
Patient was seen today in  clinic, Dr. jurado ask me to look off into why pt was getting charged the 8,000$ copay the past aayush months.    Pt was due for renewal 5/2022. Call kathy with taltz and explained what all was going on with pt. Stated that she will try to call [facility] and take care of it for pt. [facility] should be [facility] already  But because HIPPA that's all she was inform , I if pt to do see lebron soon, he will have to call [facility] and speak with pharmisit and aske them to call [employer] [phone] to lebron.    All this information was given to pt and wife

## 2022-08-19 ENCOUNTER — ANESTHESIA EVENT (OUTPATIENT)
Dept: ENDOSCOPY | Facility: HOSPITAL | Age: 57
End: 2022-08-19
Payer: COMMERCIAL

## 2022-08-19 ENCOUNTER — HOSPITAL ENCOUNTER (OUTPATIENT)
Facility: HOSPITAL | Age: 57
Discharge: HOME | End: 2022-08-19
Attending: STUDENT IN AN ORGANIZED HEALTH CARE EDUCATION/TRAINING PROGRAM | Admitting: STUDENT IN AN ORGANIZED HEALTH CARE EDUCATION/TRAINING PROGRAM
Payer: COMMERCIAL

## 2022-08-19 ENCOUNTER — ANESTHESIA (OUTPATIENT)
Dept: ENDOSCOPY | Facility: HOSPITAL | Age: 57
End: 2022-08-19
Payer: COMMERCIAL

## 2022-08-19 VITALS
SYSTOLIC BLOOD PRESSURE: 167 MMHG | OXYGEN SATURATION: 96 % | DIASTOLIC BLOOD PRESSURE: 89 MMHG | WEIGHT: 170 LBS | HEART RATE: 65 BPM | RESPIRATION RATE: 18 BRPM | BODY MASS INDEX: 27.32 KG/M2 | TEMPERATURE: 98 F | HEIGHT: 66 IN

## 2022-08-19 DIAGNOSIS — Z12.11 COLON CANCER SCREENING: Primary | ICD-10-CM

## 2022-08-19 PROCEDURE — 45385 PR COLONOSCOPY,REMV LESN,SNARE: ICD-10-PCS | Mod: 33,,, | Performed by: STUDENT IN AN ORGANIZED HEALTH CARE EDUCATION/TRAINING PROGRAM

## 2022-08-19 PROCEDURE — 37000009 HC ANESTHESIA EA ADD 15 MINS: Performed by: STUDENT IN AN ORGANIZED HEALTH CARE EDUCATION/TRAINING PROGRAM

## 2022-08-19 PROCEDURE — 88305 TISSUE EXAM BY PATHOLOGIST: ICD-10-PCS | Mod: 26,,, | Performed by: PATHOLOGY

## 2022-08-19 PROCEDURE — 88305 TISSUE EXAM BY PATHOLOGIST: CPT | Performed by: PATHOLOGY

## 2022-08-19 PROCEDURE — 63600175 PHARM REV CODE 636 W HCPCS: Performed by: NURSE ANESTHETIST, CERTIFIED REGISTERED

## 2022-08-19 PROCEDURE — 27201089 HC SNARE, DISP (ANY): Performed by: STUDENT IN AN ORGANIZED HEALTH CARE EDUCATION/TRAINING PROGRAM

## 2022-08-19 PROCEDURE — 25000003 PHARM REV CODE 250: Performed by: NURSE ANESTHETIST, CERTIFIED REGISTERED

## 2022-08-19 PROCEDURE — 37000008 HC ANESTHESIA 1ST 15 MINUTES: Performed by: STUDENT IN AN ORGANIZED HEALTH CARE EDUCATION/TRAINING PROGRAM

## 2022-08-19 PROCEDURE — 88305 TISSUE EXAM BY PATHOLOGIST: CPT | Mod: 26,,, | Performed by: PATHOLOGY

## 2022-08-19 PROCEDURE — 27202298: Performed by: STUDENT IN AN ORGANIZED HEALTH CARE EDUCATION/TRAINING PROGRAM

## 2022-08-19 PROCEDURE — 27201012 HC FORCEPS, HOT/COLD, DISP: Performed by: STUDENT IN AN ORGANIZED HEALTH CARE EDUCATION/TRAINING PROGRAM

## 2022-08-19 PROCEDURE — 45385 COLONOSCOPY W/LESION REMOVAL: CPT | Mod: 33,,, | Performed by: STUDENT IN AN ORGANIZED HEALTH CARE EDUCATION/TRAINING PROGRAM

## 2022-08-19 PROCEDURE — 45385 COLONOSCOPY W/LESION REMOVAL: CPT | Mod: PT | Performed by: STUDENT IN AN ORGANIZED HEALTH CARE EDUCATION/TRAINING PROGRAM

## 2022-08-19 RX ORDER — PROPOFOL 10 MG/ML
VIAL (ML) INTRAVENOUS CONTINUOUS PRN
Status: DISCONTINUED | OUTPATIENT
Start: 2022-08-19 | End: 2022-08-19

## 2022-08-19 RX ORDER — PROPOFOL 10 MG/ML
VIAL (ML) INTRAVENOUS
Status: DISCONTINUED | OUTPATIENT
Start: 2022-08-19 | End: 2022-08-19

## 2022-08-19 RX ORDER — LIDOCAINE HYDROCHLORIDE 20 MG/ML
INJECTION, SOLUTION EPIDURAL; INFILTRATION; INTRACAUDAL; PERINEURAL
Status: DISCONTINUED | OUTPATIENT
Start: 2022-08-19 | End: 2022-08-19

## 2022-08-19 RX ORDER — SODIUM CHLORIDE 9 MG/ML
INJECTION, SOLUTION INTRAVENOUS CONTINUOUS
Status: DISCONTINUED | OUTPATIENT
Start: 2022-08-19 | End: 2022-08-19 | Stop reason: HOSPADM

## 2022-08-19 RX ADMIN — Medication 175 MCG/KG/MIN: at 08:08

## 2022-08-19 RX ADMIN — SODIUM CHLORIDE: 0.9 INJECTION, SOLUTION INTRAVENOUS at 08:08

## 2022-08-19 RX ADMIN — PROPOFOL 100 MG: 10 INJECTION, EMULSION INTRAVENOUS at 08:08

## 2022-08-19 RX ADMIN — GLYCOPYRROLATE 0.2 MG: 0.2 INJECTION, SOLUTION INTRAMUSCULAR; INTRAVITREAL at 08:08

## 2022-08-19 RX ADMIN — LIDOCAINE HYDROCHLORIDE 50 MG: 20 INJECTION, SOLUTION EPIDURAL; INFILTRATION; INTRACAUDAL; PERINEURAL at 08:08

## 2022-08-19 NOTE — TRANSFER OF CARE
"Anesthesia Transfer of Care Note    Patient: Jerry Turner    Procedure(s) Performed: Procedure(s) (LRB):  COLONOSCOPY (N/A)    Patient location: PACU    Anesthesia Type: general    Transport from OR: Transported from OR on room air with adequate spontaneous ventilation    Post pain: adequate analgesia    Post assessment: no apparent anesthetic complications    Post vital signs: stable    Level of consciousness: awake    Nausea/Vomiting: no nausea/vomiting    Complications: none    Transfer of care protocol was followed      Last vitals:   Visit Vitals  /70(BP Location: Left arm, Patient Position: Lying)   Pulse (!) 53   Temp 36.5 °C (97.7 °F) (Temporal)   Resp 18   Ht 5' 6" (1.676 m)   Wt 77.1 kg (170 lb)   SpO2 98%   BMI 27.44 kg/m²     "

## 2022-08-19 NOTE — H&P
Short Stay Endoscopy History and Physical    PCP - Robert Babin MD  Referring Physician - Robert Babin MD  [address] [place] [address]    Procedure - Colonoscopy  ASA - per anesthesia  Mallampati - per anesthesia  History of Anesthesia problems - no  Family history Anesthesia problems -  no   Plan of anesthesia - General    HPI  57 y.o. male  Reason for procedure:   Screening [Z13.9]        ROS:  Constitutional: No fevers, chills, No weight loss  CV: No chest pain  Pulm: No cough, No shortness of breath  GI: see HPI    Medical History:  has a past medical history of Psoriasis.    Surgical History:  has a past surgical history that includes Dental graft and Spine surgery.    Family History: family history includes Cancer in his mother; Diabetes in his mother..    Social History:  reports that he has been smoking cigarettes. He has been smoking about 0.25 packs per day. He has never used smokeless tobacco. He reports current alcohol use of about 3.0 - 4.0 standard drinks of alcohol per week. He reports that he does not use drugs.    Review of patient's allergies indicates:  No Known Allergies    Medications:   Facility-Administered Medications Prior to Admission   Medication Dose Route Frequency Provider Last Rate Last Admin    triamcinolone acetonide injection 10 mg  10 mg Intradermal 1 time in Clinic/HOD Krystal Guerra MD         Medications Prior to Admission   Medication Sig Dispense Refill Last Dose    TALTZ AUTOINJECTOR 80 mg/mL AtIn INJECT 80MG UNDER THE SKIN WEEK 12 AND EVERY 4 WEEKS 1 mL 5     triamcinolone acetonide 0.1% (KENALOG) 0.1 % cream AAAscrotum bid x no more than 1 month consecutively 45 g 0        Physical Exam:    Vital Signs:   Vitals:    08/19/22 0738   BP: (!) 173/95   Pulse: (!) 53   Resp: 18   Temp: 97.7 °F (36.5 °C)       General Appearance: Well appearing in no acute distress  Abdomen: Soft, non tender, non distended with normal bowel sounds, no  masses    Labs:  Lab Results   Component Value Date    WBC 6.23 06/01/2022    HGB 14.8 06/01/2022    HCT 45.1 06/01/2022     06/01/2022    CHOL 232 (H) 06/01/2022    TRIG 148 06/01/2022    HDL 60 06/01/2022    ALT 22 06/01/2022    AST 22 06/01/2022     06/01/2022    K 4.5 06/01/2022     06/01/2022    CREATININE 0.8 06/01/2022    BUN 17 06/01/2022    CO2 24 06/01/2022    TSH 1.702 06/01/2022    PSA 0.71 06/01/2022    HGBA1C 5.7 (H) 06/01/2022       I have explained the risks and benefits of this endoscopic procedure to the patient including but not limited to bleeding, inflammation, infection, perforation, and death.    Assessment/Plan:     1. CRC Screening     - Proceed with colonoscopy       Ning Sam MD  Gastroenterology   [facility]

## 2022-08-19 NOTE — ANESTHESIA POSTPROCEDURE EVALUATION
Anesthesia Post Evaluation    Patient: Jerry Turner    Procedure(s) Performed: Procedure(s) (LRB):  COLONOSCOPY (N/A)    Final Anesthesia Type: general      Patient location during evaluation: PACU  Patient participation: Yes- Able to Participate  Level of consciousness: awake and alert and oriented  Pain management: adequate  Airway patency: patent    PONV status at discharge: No PONV  Anesthetic complications: no      Cardiovascular status: blood pressure returned to baseline and hemodynamically stable  Respiratory status: unassisted  Hydration status: euvolemic  Follow-up not needed.          Vitals Value Taken Time   /89 08/19/22 0923   Temp 36.4 °C (97.5 °F) 08/19/22 0852   Pulse 65 08/19/22 0923   Resp 18 08/19/22 0923   SpO2 96 % 08/19/22 0923         Event Time   Out of Recovery 09:30:19         Pain/Valencia Score: Valencia Score: 9 (8/19/2022  9:04 AM)

## 2022-08-19 NOTE — ANESTHESIA PREPROCEDURE EVALUATION
08/19/2022  Jerry Turner is a 57 y.o., male.  Past Medical History:   Diagnosis Date    Psoriasis      Past Surgical History:   Procedure Laterality Date    Dental graft      SPINE SURGERY       Patient Active Problem List   Diagnosis    Family history of diabetes mellitus    Psoriasis    Smoker         Pre-op Assessment    I have reviewed the Patient Summary Reports.     I have reviewed the Nursing Notes. I have reviewed the NPO Status.   I have reviewed the Medications.     Review of Systems  Anesthesia Hx:  No problems with previous Anesthesia        Physical Exam  General: Alert and Oriented    Airway:  Mallampati: I   Mouth Opening: Normal  TM Distance: Normal  Tongue: Normal  Neck ROM: Normal ROM    Dental:  Intact        Anesthesia Plan  Type of Anesthesia, risks & benefits discussed:    Anesthesia Type: Gen Natural Airway  Intra-op Monitoring Plan: Standard ASA Monitors  Induction:  IV  Airway Plan: Direct  Informed Consent: Informed consent signed with the Patient and all parties understand the risks and agree with anesthesia plan.  All questions answered.   ASA Score: 2    Ready For Surgery From Anesthesia Perspective.     .

## 2022-08-24 LAB
FINAL PATHOLOGIC DIAGNOSIS: NORMAL
GROSS: NORMAL
Lab: NORMAL

## 2023-01-18 ENCOUNTER — CLINICAL SUPPORT (OUTPATIENT)
Dept: OTHER | Facility: CLINIC | Age: 58
End: 2023-01-18

## 2023-01-18 DIAGNOSIS — Z00.8 ENCOUNTER FOR OTHER GENERAL EXAMINATION: ICD-10-CM

## 2023-01-20 VITALS
WEIGHT: 167 LBS | SYSTOLIC BLOOD PRESSURE: 146 MMHG | HEIGHT: 66 IN | BODY MASS INDEX: 26.84 KG/M2 | DIASTOLIC BLOOD PRESSURE: 92 MMHG

## 2023-01-20 LAB
HDLC SERPL-MCNC: 68 MG/DL
POC CHOLESTEROL, LDL (DOCK): 139 MG/DL
POC CHOLESTEROL, TOTAL: 227 MG/DL
POC GLUCOSE, FASTING: 102 MG/DL (ref 60–110)
TRIGL SERPL-MCNC: 116 MG/DL

## 2023-01-31 ENCOUNTER — PATIENT MESSAGE (OUTPATIENT)
Dept: DERMATOLOGY | Facility: CLINIC | Age: 58
End: 2023-01-31
Payer: COMMERCIAL

## 2023-02-10 DIAGNOSIS — L40.9 PSORIASIS: Primary | ICD-10-CM

## 2023-02-20 ENCOUNTER — HOSPITAL ENCOUNTER (OUTPATIENT)
Dept: RADIOLOGY | Facility: HOSPITAL | Age: 58
Discharge: HOME | End: 2023-02-20
Attending: FAMILY MEDICINE
Payer: COMMERCIAL

## 2023-02-20 ENCOUNTER — OFFICE VISIT (OUTPATIENT)
Dept: INTERNAL MEDICINE | Facility: CLINIC | Age: 58
End: 2023-02-20
Payer: COMMERCIAL

## 2023-02-20 VITALS
OXYGEN SATURATION: 97 % | TEMPERATURE: 98 F | BODY MASS INDEX: 27.35 KG/M2 | SYSTOLIC BLOOD PRESSURE: 138 MMHG | HEART RATE: 79 BPM | HEIGHT: 66 IN | WEIGHT: 170.19 LBS | RESPIRATION RATE: 18 BRPM | DIASTOLIC BLOOD PRESSURE: 88 MMHG

## 2023-02-20 DIAGNOSIS — R20.0 BILATERAL HAND NUMBNESS: ICD-10-CM

## 2023-02-20 DIAGNOSIS — M79.642 BILATERAL HAND PAIN: ICD-10-CM

## 2023-02-20 DIAGNOSIS — M79.642 BILATERAL HAND PAIN: Primary | ICD-10-CM

## 2023-02-20 DIAGNOSIS — M79.641 BILATERAL HAND PAIN: Primary | ICD-10-CM

## 2023-02-20 DIAGNOSIS — M79.641 BILATERAL HAND PAIN: ICD-10-CM

## 2023-02-20 PROCEDURE — 73130 X-RAY EXAM OF HAND: CPT | Mod: TC,50,PO

## 2023-02-20 PROCEDURE — 1160F PR REVIEW ALL MEDS BY PRESCRIBER/CLIN PHARMACIST DOCUMENTED: ICD-10-PCS | Mod: CPTII,S$GLB,, | Performed by: FAMILY MEDICINE

## 2023-02-20 PROCEDURE — 99999 PR PBB SHADOW E&M-EST. PATIENT-LVL V: ICD-10-PCS | Mod: PBBFAC,,, | Performed by: FAMILY MEDICINE

## 2023-02-20 PROCEDURE — 3079F PR MOST RECENT DIASTOLIC BLOOD PRESSURE 80-89 MM HG: ICD-10-PCS | Mod: CPTII,S$GLB,, | Performed by: FAMILY MEDICINE

## 2023-02-20 PROCEDURE — 3079F DIAST BP 80-89 MM HG: CPT | Mod: CPTII,S$GLB,, | Performed by: FAMILY MEDICINE

## 2023-02-20 PROCEDURE — 1159F PR MEDICATION LIST DOCUMENTED IN MEDICAL RECORD: ICD-10-PCS | Mod: CPTII,S$GLB,, | Performed by: FAMILY MEDICINE

## 2023-02-20 PROCEDURE — 3075F PR MOST RECENT SYSTOLIC BLOOD PRESS GE 130-139MM HG: ICD-10-PCS | Mod: CPTII,S$GLB,, | Performed by: FAMILY MEDICINE

## 2023-02-20 PROCEDURE — 99214 PR OFFICE/OUTPT VISIT, EST, LEVL IV, 30-39 MIN: ICD-10-PCS | Mod: S$GLB,,, | Performed by: FAMILY MEDICINE

## 2023-02-20 PROCEDURE — 73130 XR HAND COMPLETE 3 VIEWS BILATERAL: ICD-10-PCS | Mod: 26,,, | Performed by: RADIOLOGY

## 2023-02-20 PROCEDURE — 3075F SYST BP GE 130 - 139MM HG: CPT | Mod: CPTII,S$GLB,, | Performed by: FAMILY MEDICINE

## 2023-02-20 PROCEDURE — 3008F BODY MASS INDEX DOCD: CPT | Mod: CPTII,S$GLB,, | Performed by: FAMILY MEDICINE

## 2023-02-20 PROCEDURE — 73130 X-RAY EXAM OF HAND: CPT | Mod: 26,,, | Performed by: RADIOLOGY

## 2023-02-20 PROCEDURE — 1159F MED LIST DOCD IN RCRD: CPT | Mod: CPTII,S$GLB,, | Performed by: FAMILY MEDICINE

## 2023-02-20 PROCEDURE — 99214 OFFICE O/P EST MOD 30 MIN: CPT | Mod: S$GLB,,, | Performed by: FAMILY MEDICINE

## 2023-02-20 PROCEDURE — 1160F RVW MEDS BY RX/DR IN RCRD: CPT | Mod: CPTII,S$GLB,, | Performed by: FAMILY MEDICINE

## 2023-02-20 PROCEDURE — 3008F PR BODY MASS INDEX (BMI) DOCUMENTED: ICD-10-PCS | Mod: CPTII,S$GLB,, | Performed by: FAMILY MEDICINE

## 2023-02-20 PROCEDURE — 99999 PR PBB SHADOW E&M-EST. PATIENT-LVL V: CPT | Mod: PBBFAC,,, | Performed by: FAMILY MEDICINE

## 2023-02-20 RX ORDER — MELOXICAM 15 MG/1
TABLET ORAL
Qty: 30 TABLET | Refills: 0 | Status: SHIPPED | OUTPATIENT
Start: 2023-02-20 | End: 2023-03-20

## 2023-02-20 NOTE — PROGRESS NOTES
Subjective:       Patient ID: Jerry Turner is a 57 y.o. male.    Chief Complaint: Numbness (hands) and Hand Pain  57-year-old male presents to clinic today secondary to concerns of bilateral hand pain and numbness for the past 3 months.  He works as a .  He reports frequent bilateral hand pain that worsens with activity.  He reports that at the end of the day when he returns home his hands feel numb and frequently feel as if they are in hot water.  He has been using a hand brace and Tylenol with mild relief.  Hand Pain   Associated symptoms include numbness (bilateral hands). Pertinent negatives include no chest pain.   Review of Systems   Constitutional:  Negative for appetite change, chills, fatigue and fever.   HENT:  Negative for nasal congestion, ear pain, hearing loss, postnasal drip, rhinorrhea, sinus pressure/congestion, sore throat and tinnitus.    Eyes:  Negative for redness, itching and visual disturbance.   Respiratory:  Negative for cough, chest tightness and shortness of breath.    Cardiovascular:  Negative for chest pain and palpitations.   Gastrointestinal:  Negative for abdominal pain, constipation, diarrhea, nausea and vomiting.   Genitourinary:  Negative for decreased urine volume, difficulty urinating, dysuria, frequency, hematuria and urgency.   Musculoskeletal:  Positive for arthralgias (Bilateral hands). Negative for back pain, myalgias, neck pain and neck stiffness.   Integumentary:  Negative for rash.   Neurological:  Positive for numbness (bilateral hands). Negative for dizziness, light-headedness and headaches.   Psychiatric/Behavioral: Negative.         Objective:      Physical Exam  Vitals and nursing note reviewed.   Constitutional:       General: He is not in acute distress.     Appearance: Normal appearance. He is well-developed. He is not diaphoretic.   HENT:      Head: Normocephalic and atraumatic.      Right Ear: External ear normal.      Left Ear: External ear normal.       Nose: Nose normal.      Mouth/Throat:      Pharynx: No oropharyngeal exudate.   Eyes:      General: No scleral icterus.        Right eye: No discharge.         Left eye: No discharge.      Conjunctiva/sclera: Conjunctivae normal.      Pupils: Pupils are equal, round, and reactive to light.   Neck:      Thyroid: No thyromegaly.      Vascular: No JVD.      Trachea: No tracheal deviation.   Cardiovascular:      Rate and Rhythm: Normal rate and regular rhythm.      Heart sounds: Normal heart sounds. No murmur heard.    No friction rub. No gallop.   Pulmonary:      Effort: Pulmonary effort is normal. No respiratory distress.      Breath sounds: Normal breath sounds. No stridor. No wheezing, rhonchi or rales.   Chest:      Chest wall: No tenderness.   Abdominal:      General: Bowel sounds are normal. There is no distension.      Palpations: Abdomen is soft. There is no mass.      Tenderness: There is no abdominal tenderness. There is no guarding or rebound.   Musculoskeletal:         General: No tenderness. Normal range of motion.      Cervical back: Normal range of motion and neck supple.   Lymphadenopathy:      Cervical: No cervical adenopathy.   Skin:     General: Skin is warm and dry.      Coloration: Skin is not pale.      Findings: No erythema or rash.   Neurological:      Mental Status: He is alert and oriented to person, place, and time.   Psychiatric:         Mood and Affect: Mood normal.         Behavior: Behavior normal.         Thought Content: Thought content normal.         Judgment: Judgment normal.       Assessment:       Problem List Items Addressed This Visit    None  Visit Diagnoses       Bilateral hand pain    -  Primary    Relevant Medications    meloxicam (MOBIC) 15 MG tablet    Other Relevant Orders    X-Ray Hand 3 View Bilateral    Ambulatory referral/consult to Orthopedics    Bilateral hand numbness        Relevant Medications    meloxicam (MOBIC) 15 MG tablet    Other Relevant Orders    X-Ray  Hand 3 View Bilateral    Ambulatory referral/consult to Orthopedics              Plan:         1. Bilateral hand x-rays now.    2. Continue use of brace.    3. Meloxicam 15 mg daily with a meal for 10 days then as needed for pain.    4. Refer to orthopedics for further evaluation and treatment.    5. Return to clinic as needed or in 4 months for annual physical exam.

## 2023-03-01 ENCOUNTER — LAB VISIT (OUTPATIENT)
Dept: LAB | Facility: HOSPITAL | Age: 58
End: 2023-03-01
Attending: DERMATOLOGY
Payer: COMMERCIAL

## 2023-03-01 DIAGNOSIS — L40.9 PSORIASIS: ICD-10-CM

## 2023-03-01 LAB
BASOPHILS # BLD AUTO: 0.06 K/UL (ref 0–0.2)
BASOPHILS NFR BLD: 0.8 % (ref 0–1.9)
DIFFERENTIAL METHOD: ABNORMAL
EOSINOPHIL # BLD AUTO: 0.4 K/UL (ref 0–0.5)
EOSINOPHIL NFR BLD: 5.3 % (ref 0–8)
ERYTHROCYTE [DISTWIDTH] IN BLOOD BY AUTOMATED COUNT: 13.5 % (ref 11.5–14.5)
HCT VFR BLD AUTO: 41.8 % (ref 40–54)
HGB BLD-MCNC: 13.6 G/DL (ref 14–18)
IMM GRANULOCYTES # BLD AUTO: 0.02 K/UL (ref 0–0.04)
IMM GRANULOCYTES NFR BLD AUTO: 0.3 % (ref 0–0.5)
LYMPHOCYTES # BLD AUTO: 3.2 K/UL (ref 1–4.8)
LYMPHOCYTES NFR BLD: 40.1 % (ref 18–48)
MCH RBC QN AUTO: 32.9 PG (ref 27–31)
MCHC RBC AUTO-ENTMCNC: 32.5 G/DL (ref 32–36)
MCV RBC AUTO: 101 FL (ref 82–98)
MONOCYTES # BLD AUTO: 0.6 K/UL (ref 0.3–1)
MONOCYTES NFR BLD: 7 % (ref 4–15)
NEUTROPHILS # BLD AUTO: 3.7 K/UL (ref 1.8–7.7)
NEUTROPHILS NFR BLD: 46.5 % (ref 38–73)
NRBC BLD-RTO: 0 /100 WBC
PLATELET # BLD AUTO: 167 K/UL (ref 150–450)
PMV BLD AUTO: 11.3 FL (ref 9.2–12.9)
RBC # BLD AUTO: 4.14 M/UL (ref 4.6–6.2)
WBC # BLD AUTO: 7.88 K/UL (ref 3.9–12.7)

## 2023-03-01 PROCEDURE — 36415 COLL VENOUS BLD VENIPUNCTURE: CPT | Performed by: DERMATOLOGY

## 2023-03-01 PROCEDURE — 85025 COMPLETE CBC W/AUTO DIFF WBC: CPT | Performed by: DERMATOLOGY

## 2023-03-09 ENCOUNTER — TELEPHONE (OUTPATIENT)
Dept: DERMATOLOGY | Facility: CLINIC | Age: 58
End: 2023-03-09
Payer: COMMERCIAL

## 2023-03-09 NOTE — TELEPHONE ENCOUNTER
----- Message from Minnie Harris sent at 3/9/2023 11:34 AM CST -----  Regarding: appt access  Contact: pt @ [phone]  Pt is waiting in virtual waiting room asking if he will be seen today still. Please advise, thank you.

## 2023-03-20 ENCOUNTER — OFFICE VISIT (OUTPATIENT)
Dept: DERMATOLOGY | Facility: CLINIC | Age: 58
End: 2023-03-20
Payer: COMMERCIAL

## 2023-03-20 DIAGNOSIS — Z79.899 ENCOUNTER FOR LONG-TERM (CURRENT) USE OF HIGH-RISK MEDICATION: ICD-10-CM

## 2023-03-20 DIAGNOSIS — L40.9 PSORIASIS: Primary | ICD-10-CM

## 2023-03-20 PROCEDURE — 99999 PR PBB SHADOW E&M-EST. PATIENT-LVL III: ICD-10-PCS | Mod: PBBFAC,,, | Performed by: DERMATOLOGY

## 2023-03-20 PROCEDURE — 11900 INJECT SKIN LESIONS </W 7: CPT | Mod: S$GLB,,, | Performed by: DERMATOLOGY

## 2023-03-20 PROCEDURE — 1160F RVW MEDS BY RX/DR IN RCRD: CPT | Mod: CPTII,S$GLB,, | Performed by: DERMATOLOGY

## 2023-03-20 PROCEDURE — 11900 PR INJECTION INTO SKIN LESIONS, UP TO 7: ICD-10-PCS | Mod: S$GLB,,, | Performed by: DERMATOLOGY

## 2023-03-20 PROCEDURE — 99214 OFFICE O/P EST MOD 30 MIN: CPT | Mod: 25,S$GLB,, | Performed by: DERMATOLOGY

## 2023-03-20 PROCEDURE — 99999 PR PBB SHADOW E&M-EST. PATIENT-LVL III: CPT | Mod: PBBFAC,,, | Performed by: DERMATOLOGY

## 2023-03-20 PROCEDURE — 1160F PR REVIEW ALL MEDS BY PRESCRIBER/CLIN PHARMACIST DOCUMENTED: ICD-10-PCS | Mod: CPTII,S$GLB,, | Performed by: DERMATOLOGY

## 2023-03-20 PROCEDURE — 1159F PR MEDICATION LIST DOCUMENTED IN MEDICAL RECORD: ICD-10-PCS | Mod: CPTII,S$GLB,, | Performed by: DERMATOLOGY

## 2023-03-20 PROCEDURE — 1159F MED LIST DOCD IN RCRD: CPT | Mod: CPTII,S$GLB,, | Performed by: DERMATOLOGY

## 2023-03-20 PROCEDURE — 99214 PR OFFICE/OUTPT VISIT, EST, LEVL IV, 30-39 MIN: ICD-10-PCS | Mod: 25,S$GLB,, | Performed by: DERMATOLOGY

## 2023-03-20 NOTE — ASSESSMENT & PLAN NOTE
Today's Plan:      D/c smoking    Cont Taltz at 80mg SQ q month  Intralesional Kenalog 5mg/cc (1.0 cc total) injected into 1 lesions on the left scrotum today after obtaining verbal consent including risk of surrounding hypopigmentation. Patient tolerated procedure well.     Units: 1  NDC for Kenalog 10mg/cc:  0866-4165-32  TAC cream to scrotum area bid x 1 month consecutively then break

## 2023-03-20 NOTE — PROGRESS NOTES
Subjective:       Patient ID:  Lester Turner is a 57 y.o. male who presents for   Chief Complaint   Patient presents with    Follow-up     psoriasis     Psoriasis - Follow-up  Symptom course: improving  Currently using: last taltz injection 3/5/23 and occasionally using child's TAC cream to scrotum which is no longer itching   Affected locations: groin  Signs / symptoms: none      Review of Systems   Constitutional:  Negative for fever, weight loss and weight gain.   HENT:  Negative for mouth sores (no tongue whiteness).    Respiratory:  Negative for cough and shortness of breath.         + smokes 1/2 ppd   Gastrointestinal:  Negative for nausea, vomiting, abdominal pain and diarrhea.   Musculoskeletal:  Positive for arthralgias (gout).   Skin:  Positive for rash. Negative for itching.           no ISR      Objective:    Physical Exam   Constitutional: He appears well-developed and well-nourished. No distress.   Genitourinary:         Neurological: He is alert and oriented to person, place, and time. He is not disoriented.   Psychiatric: He has a normal mood and affect.   Skin:   Areas Examined (abnormalities noted in diagram):   Genitals / Buttocks / Groin Inspection Performed  RUE Inspected  LUE Inspection Performed  RLE Inspected  LLE Inspection Performed  Nails and Digits Inspection Performed                Diagram Legend     Erythematous scaling macule/papule c/w actinic keratosis       Vascular papule c/w angioma      Pigmented verrucoid papule/plaque c/w seborrheic keratosis      Yellow umbilicated papule c/w sebaceous hyperplasia      Irregularly shaped tan macule c/w lentigo     1-2 mm smooth white papules consistent with Milia      Movable subcutaneous cyst with punctum c/w epidermal inclusion cyst      Subcutaneous movable cyst c/w pilar cyst      Firm pink to brown papule c/w dermatofibroma      Pedunculated fleshy papule(s) c/w skin tag(s)      Evenly pigmented macule c/w junctional nevus     Mildly  variegated pigmented, slightly irregular-bordered macule c/w mildly atypical nevus      Flesh colored to evenly pigmented papule c/w intradermal nevus       Pink pearly papule/plaque c/w basal cell carcinoma      Erythematous hyperkeratotic cursted plaque c/w SCC      Surgical scar with no sign of skin cancer recurrence      Open and closed comedones      Inflammatory papules and pustules      Verrucoid papule consistent consistent with wart     Erythematous eczematous patches and plaques     Dystrophic onycholytic nail with subungual debris c/w onychomycosis     Umbilicated papule    Erythematous-base heme-crusted tan verrucoid plaque consistent with inflamed seborrheic keratosis     Erythematous Silvery Scaling Plaque c/w Psoriasis     See annotation    Lab Results   Component Value Date    WBC 7.88 03/01/2023    HGB 13.6 (L) 03/01/2023    HCT 41.8 03/01/2023     (H) 03/01/2023     03/01/2023         Assessment / Plan:        Psoriasis  -     triamcinolone acetonide injection 10 mg  -     NM [id number] ACETONIDE INJ PER 10MG  -     NM INJECTION INTO SKIN LESIONS, UP TO 7    Encounter for long-term (current) use of high-risk medication  -     X-Ray Chest PA And Lateral; Future; Expected date: 03/20/2023      Psoriasis  Today's Plan:      D/c smoking    Cont Taltz at 80mg SQ q month  Intralesional Kenalog 5mg/cc (1.0 cc total) injected into 1 lesions on the left scrotum today after obtaining verbal consent including risk of surrounding hypopigmentation. Patient tolerated procedure well.     Units: 1  NDC for Kenalog 10mg/cc:  4743-1460-73  TAC cream to scrotum area bid x 1 month consecutively then break         No follow-ups on file.

## 2023-03-21 NOTE — PROGRESS NOTES
Patient received sample medication per verbal order by . Medication Taltz  dose 80mg/ml.  Lot [id number], expiration 7/20/2024.

## 2023-03-22 ENCOUNTER — OFFICE VISIT (OUTPATIENT)
Dept: ORTHOPEDICS | Facility: CLINIC | Age: 58
End: 2023-03-22
Payer: COMMERCIAL

## 2023-03-22 DIAGNOSIS — R20.0 BILATERAL HAND NUMBNESS: ICD-10-CM

## 2023-03-22 DIAGNOSIS — G56.03 CARPAL TUNNEL SYNDROME, BILATERAL: Primary | ICD-10-CM

## 2023-03-22 DIAGNOSIS — M79.641 BILATERAL HAND PAIN: ICD-10-CM

## 2023-03-22 DIAGNOSIS — M65.312 TRIGGER FINGER OF LEFT THUMB: ICD-10-CM

## 2023-03-22 DIAGNOSIS — M79.642 BILATERAL HAND PAIN: ICD-10-CM

## 2023-03-22 PROCEDURE — 20550 TENDON SHEATH: ICD-10-PCS | Mod: LT,S$GLB,, | Performed by: ORTHOPAEDIC SURGERY

## 2023-03-22 PROCEDURE — 20550 NJX 1 TENDON SHEATH/LIGAMENT: CPT | Mod: LT,S$GLB,, | Performed by: ORTHOPAEDIC SURGERY

## 2023-03-22 PROCEDURE — 1159F PR MEDICATION LIST DOCUMENTED IN MEDICAL RECORD: ICD-10-PCS | Mod: CPTII,S$GLB,, | Performed by: ORTHOPAEDIC SURGERY

## 2023-03-22 PROCEDURE — 1159F MED LIST DOCD IN RCRD: CPT | Mod: CPTII,S$GLB,, | Performed by: ORTHOPAEDIC SURGERY

## 2023-03-22 PROCEDURE — 99204 OFFICE O/P NEW MOD 45 MIN: CPT | Mod: 25,S$GLB,, | Performed by: ORTHOPAEDIC SURGERY

## 2023-03-22 PROCEDURE — 99204 PR OFFICE/OUTPT VISIT, NEW, LEVL IV, 45-59 MIN: ICD-10-PCS | Mod: 25,S$GLB,, | Performed by: ORTHOPAEDIC SURGERY

## 2023-03-22 PROCEDURE — 99999 PR PBB SHADOW E&M-EST. PATIENT-LVL III: CPT | Mod: PBBFAC,,, | Performed by: ORTHOPAEDIC SURGERY

## 2023-03-22 PROCEDURE — 1160F RVW MEDS BY RX/DR IN RCRD: CPT | Mod: CPTII,S$GLB,, | Performed by: ORTHOPAEDIC SURGERY

## 2023-03-22 PROCEDURE — 99999 PR PBB SHADOW E&M-EST. PATIENT-LVL III: ICD-10-PCS | Mod: PBBFAC,,, | Performed by: ORTHOPAEDIC SURGERY

## 2023-03-22 PROCEDURE — 1160F PR REVIEW ALL MEDS BY PRESCRIBER/CLIN PHARMACIST DOCUMENTED: ICD-10-PCS | Mod: CPTII,S$GLB,, | Performed by: ORTHOPAEDIC SURGERY

## 2023-03-22 RX ADMIN — METHYLPREDNISOLONE ACETATE 40 MG: 40 INJECTION, SUSPENSION INTRA-ARTICULAR; INTRALESIONAL; INTRAMUSCULAR; SOFT TISSUE at 11:03

## 2023-03-22 NOTE — PROCEDURES
Tendon Sheath    Date/Time: 3/22/2023 11:30 AM  Performed by: Joyce Hilario MD  Authorized by: Joyce Hilario MD     Consent Done?:  Yes (Verbal)  Indications:  Pain  Timeout: prior to procedure the correct patient, procedure, and site was verified    Prep: patient was prepped and draped in usual sterile fashion      Local anesthesia used?: Yes    Anesthesia:  Local infiltration  Local anesthetic:  Lidocaine 1% without epinephrine  Anesthetic total (ml):  1    Location:  Thumb  Site:  L thumb flexor tendon sheath  Ultrasonic guidance for needle placement?: No    Needle size:  25 G  Approach:  Volar  Medications:  40 mg methylPREDNISolone acetate 40 mg/mL  Patient tolerance:  Patient tolerated the procedure well with no immediate complications

## 2023-03-22 NOTE — PROGRESS NOTES
[facility]  History & Physical  Orthopedics    SUBJECTIVE:      COVID-19 attestation:  This patient was treated during the COVID-19 pandemic.  This was discussed with the patient, they are aware of our current policies and procedures, were given the option of delaying their visit and or switching to a virtual visit, delaying their surgery when applicable, and they elect to proceed.    Chief Complaint:   Chief Complaint   Patient presents with    Right Hand - Pain    Left Hand - Pain       Referring Provider: Robert Babin MD     History of Present Illness:  Patient is a 57 y.o. right hand dominant male who presents today with complaints of bilateral numbness and tingling in the hands for 4-5 months.  There is constant numbness in all of the fingers, but is most prominent in the thumb, index, and long finger on the right hand. The patient reports pain at night and worsening of symptoms. He reports weakness in the right hand. He has worn night braces which help some. Took Meloxicam because it upsets his stomach.    The patient is a .    The patient rates their pain as a 0/10.     The patient denies any fevers, chills, N/V, D/C and presents for evaluation.       Past Medical History:   Diagnosis Date    Psoriasis      Past Surgical History:   Procedure Laterality Date    COLONOSCOPY N/A 8/19/2022    Procedure: COLONOSCOPY;  Surgeon: Ning Sam MD;  Location: [facility] ([address] [place] [address]);  Service: Endoscopy;  Laterality: N/A;  Pt. is vaccinated. will bring card and show to lab day of proc.EC    Dental graft      SPINE SURGERY       Review of patient's allergies indicates:  No Known Allergies  Social History     Social History Narrative    Not on file     Family History   Problem Relation Age of Onset    Diabetes Mother     Cancer Mother         Breast Cancer    Eczema Neg Hx     Psoriasis Neg Hx          Current Outpatient Medications:     meloxicam (MOBIC) 15 MG tablet, TAKE 1 TABLET  BY MOUTH WITH A MEAL FOR 10 DAYS, THEN CAN USE AS NEEDED FOR PAIN, Disp: 30 tablet, Rfl: 0    TALTZ AUTOINJECTOR 80 mg/mL AtIn, INJECT 80 MG UNDER THE SKIN EVERY 4 WEEKS, Disp: 1 mL, Rfl: 12    triamcinolone acetonide 0.1% (KENALOG) 0.1 % cream, AAAscrotum bid x no more than 1 month consecutively, Disp: 45 g, Rfl: 0    Current Facility-Administered Medications:     triamcinolone acetonide injection 10 mg, 10 mg, Intradermal, Once, MD ALVIN Camara    Review of Systems:  Constitutional: no fever or chills  Eyes: no visual changes  ENT: no nasal congestion or sore throat  Respiratory: no cough or shortness of breath  Cardiovascular: no chest pain  Gastrointestinal: no nausea or vomiting, tolerating diet  Musculoskeletal: pain, soreness, and numbness/tingling    OBJECTIVE:      Vital Signs (Most Recent):  There were no vitals filed for this visit.  There is no height or weight on file to calculate BMI.    Physical Exam    Physical Exam:  Constitutional: The patient appears well-developed and well-nourished. No distress.   Head: Normocephalic and atraumatic.   Nose: Nose normal.   Eyes: Conjunctivae and EOM are normal.   Neck: No tracheal deviation present.   Cardiovascular: Normal rate and intact distal pulses.    Pulmonary/Chest: Effort normal. No respiratory distress.   Abdominal: There is no guarding.   Lymphatic: Negative for adenopathy   Neurological: The patient is alert.   Psychiatric: The patient has a normal mood and affect.     Cervical Exam:  ROM full, supple  Negative Spurling's  Negative Us's    Bilateral Hand/Wrist Examination:    Observation/Inspection:  Swelling  none    Deformity  none  Discoloration  none     Scars   none    Atrophy  none    HAND/WRIST EXAMINATION:  Finkelstein's Test   Neg  WHAT Test    Neg  CMC grind    Neg  Circumduction test   Neg    Tender to palpation left thumb A1 pulley, demonstrable catching and locking otherwise Bilateral ROM hand/wrist/elbow  full    Neurovascular Exam:  Digits WWP, brisk CR < 3s throughout  NVI motor/LTS to M/R/U nerves, radial pulse 2+  2+ biceps and brachioradialis reflexes  Tinel's Test - Carpal Tunnel  positive bilaterally  Tinel's Test - Cubital Tunnel  neg  Phalen's Test    positive bilaterally  Median Nerve Compression Test positive bilaterally    Diagnostic Results:      X-rays AP, lateral and oblique bilateral hands taken today are independently reviewed by me and shows 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       ASSESSMENT/PLAN:      57 y.o. yo male with   Encounter Diagnoses   Name Primary?    Bilateral hand pain     Bilateral hand numbness     Carpal tunnel syndrome, bilateral Yes    Trigger finger of left thumb       Plan:  With regards to his bilateral carpal tunnel syndrome, I have given him bilateral carpal tunnel braces that he can wear at nighttime.  I have recommended an EMG/NCS to assess the severity of the CTS and possible cervical involvement. We have discussed the natural history of carpal tunnel syndrome and the possibility of cortisone shots as well.    -I have offered him a selective injection. I have explained the risks, benefits, and alternatives of the procedure in detail.  The patient voices understanding and all questions have been answered. The patient agrees to proceed as planned. So after a sterile prep of the skin in the normal fashion the left thumb flexor sheath was injected using a 25 gauge needle with a combination of 1cc 1% plain lidocaine and 40 mg of methylprednisolone.  The patient is cautioned and immediate relief of pain is secondary to the local anesthetic and will be temporary.  After the anesthetic wears off there may be a increase in pain that may last for a few hours or a few days and they should use ice to help alleviate this flair up of pain. Patient tolerated the procedure well.    I will see  him after EMG/NCS or for any other questions or problems in the interim as indicated and certainly for any other issues.    The patient's pathophysiology was explained in detail with reference to x-rays, models, other visual aids as appropriate.  Treatment options were discussed in detail.  Questions were invited and answered to the patient's satisfaction. I reviewed Primary care , and other specialty's notes to better coordinate patient's care.        Joyce Hilario MD    Please be aware that this note has been generated with the assistance of [employer] voice-to-text.  Please excuse any spelling or grammatical errors.

## 2023-03-31 RX ORDER — METHYLPREDNISOLONE ACETATE 40 MG/ML
40 INJECTION, SUSPENSION INTRA-ARTICULAR; INTRALESIONAL; INTRAMUSCULAR; SOFT TISSUE
Status: DISCONTINUED | OUTPATIENT
Start: 2023-03-22 | End: 2023-03-31 | Stop reason: HOSPADM

## 2023-04-25 ENCOUNTER — PATIENT MESSAGE (OUTPATIENT)
Dept: RESEARCH | Facility: HOSPITAL | Age: 58
End: 2023-04-25
Payer: COMMERCIAL

## 2023-05-02 ENCOUNTER — PATIENT MESSAGE (OUTPATIENT)
Dept: RESEARCH | Facility: HOSPITAL | Age: 58
End: 2023-05-02
Payer: COMMERCIAL

## 2023-05-08 ENCOUNTER — PROCEDURE VISIT (OUTPATIENT)
Dept: NEUROLOGY | Facility: CLINIC | Age: 58
End: 2023-05-08
Payer: COMMERCIAL

## 2023-05-08 DIAGNOSIS — G56.03 CARPAL TUNNEL SYNDROME, BILATERAL: ICD-10-CM

## 2023-05-08 PROCEDURE — 95885 PR MUSC TST DONE W/NERV TST LIM: ICD-10-PCS | Mod: 59,S$GLB,, | Performed by: PHYSICAL MEDICINE & REHABILITATION

## 2023-05-08 PROCEDURE — 95911 NRV CNDJ TEST 9-10 STUDIES: CPT | Mod: S$GLB,,, | Performed by: PHYSICAL MEDICINE & REHABILITATION

## 2023-05-08 PROCEDURE — 95886 MUSC TEST DONE W/N TEST COMP: CPT | Mod: S$GLB,,, | Performed by: PHYSICAL MEDICINE & REHABILITATION

## 2023-05-08 PROCEDURE — 95911 PR NERVE CONDUCTION STUDY; 9-10 STUDIES: ICD-10-PCS | Mod: S$GLB,,, | Performed by: PHYSICAL MEDICINE & REHABILITATION

## 2023-05-08 PROCEDURE — 95886 PR EMG COMPLETE, W/ NERVE CONDUCTION STUDIES, 5+ MUSCLES: ICD-10-PCS | Mod: S$GLB,,, | Performed by: PHYSICAL MEDICINE & REHABILITATION

## 2023-05-08 PROCEDURE — 95885 MUSC TST DONE W/NERV TST LIM: CPT | Mod: 59,S$GLB,, | Performed by: PHYSICAL MEDICINE & REHABILITATION

## 2023-05-08 NOTE — PROCEDURES
Test Date:  2023    Patient: jerry turner : 1965 Physician: Rui Mosley D.O.   ID#: [id number] SEX: Male Ref. Phys: Joyce Hilario MD     HPI: Jerry Turner is a 57 y.o.male who presents for NCS/EMG to evaluate bilateral hand paresthesias that are worse when working with hands. Symptoms have been present for nearly 6 months. No h/o DMII, thyroid disease or alcohol abuse. NCS/EMG to evaluate for CTS.      NCV & EMG Findings:  Evaluation of the left median sensory, the right median sensory, the left ulnar sensory, and the right ulnar sensory nerves showed prolonged distal peak latency and decreased conduction velocity.  All remaining nerves (as indicated in the following tables) were within normal limits.  Needle evaluation of the right Pronator Teres muscle showed increased motor unit amplitude, increased motor unit duration, moderately increased polyphasic potentials, and diminished recruitment.  All remaining muscles (as indicated in the following table) showed no evidence of electrical instability.    Impression:  There is electrophysiologic evidence of a bilateral sensory median mononeuropathy across the wrist (I.e. Carpal tunnel syndrome).  There is no motor axonal loss.  There is no active denervation.  This is graded as Mild in severity bilaterally.    There is also evidence of mild ulnar sensory neuropathies at the wrists.   Note that the mild slowing is relatively symmetrical for the bilateral median and ulnar nerves.  The radial sensory latencies are normal.  This can also indicate an early demyelinating peripheral polyneuropathy. However, he has no symptoms in the legs/feet, which would typically be present if symptoms are present in the hands in a length dependent neuropathy.  Still, a polyneuropathy cannot be excluded entirely.      Lastly, there were chronic neuropathic changes isolated to the right pronator teres.  In isolation to one muscle, this is of limited diagnostic significance.       ___________________________  Rui Mosley D.O.        NCS+  Motor Nerve Results      Latency Amplitude F-Lat Segment Distance CV Comment   Site (ms) Norm (mV) Norm (ms)  (cm) (m/s) Norm    Left Median (APB)   Wrist 4.3  < 4.7 5.9  > 4.2  Wrist-Palm - - -    Elbow 8.8 - 5.4 -  Elbow-Wrist 21.5 48  > 47    Right Median (APB)   Wrist 3.9  < 4.7 5.4  > 4.2  Wrist-Palm - - -    Elbow 8.4 - 5.1 -  Elbow-Wrist 22 49  > 47    Left Ulnar (ADM)   Wrist 3.4  < 3.7 8.6  > 3.0         Bel Elbow 6.8 - 7.9 -  Bel Elbow-Wrist 19 56  > 52    Abv Elbow 8.7 - 8.1 -  Abv Elbow-Bel Elbow 10.5 55  > 43    Right Ulnar (ADM)   Wrist 3.0  < 3.7 9.6  > 3.0         Bel Elbow 6.3 - 8.0 -  Bel Elbow-Wrist 18.5 56  > 52    Abv Elbow 7.7 - 8.8 -  Abv Elbow-Bel Elbow 9.5 68  > 43      Sensory Nerve Results      Latency (Peak) Amplitude (P-P) Segment Distance CV Comment   Site (ms) Norm (µV) Norm  (cm) (m/s) Norm    Left Median   Wrist-Dig II *4.2  < 4.0 28  > 8 Wrist-Dig II 14 *33  > 39    Right Median   Wrist-Dig II *4.2  < 4.0 26  > 8 Wrist-Dig II 14 *33  > 39    Left Ulnar   Wrist-Dig V *4.2  < 4.0 37  > 4 Wrist-Dig V 14 *33  > 38    Right Ulnar   Wrist-Dig V *4.1  < 4.0 23  > 4 Wrist-Dig V 14 *34  > 38    Left Radial   Forearm-Wrist 2.4  < 2.8 33  > 11 Forearm-Wrist 10 42 -    Right Radial   Forearm-Wrist 2.4  < 2.8 17  > 11 Forearm-Wrist 10 42 -      EMG+     Side Muscle Nerve Root Ins Act Fibs Psw Amp Dur Poly Recrt Int Pat Comment   Left FDI Ulnar C8-T1 Nml Nml Nml Nml Nml 0 Nml Nml    Left APB Median C8-T1 Nml Nml Nml Nml Nml 0 Nml Nml    Right Biceps Musculocut C5-C6 Nml Nml Nml Nml Nml 0 Nml Nml    Right Deltoid Axillary C5-C6 Nml Nml Nml Nml Nml 0 Nml Nml    Right Pronator Teres Median C6-C7 Nml Nml Nml *Incr *>12ms *2+ *Reduced Nml    Right Triceps Radial C6-C8 Nml Nml Nml Nml Nml 0 Nml Nml    Right Cervical Parasp (Lower) Rami C7-C8 Nml Nml Nml         Right EIP Post Interosseous,  R... C7-C8 Nml Nml Nml Nml Nml 0 Nml Nml     Right FDI Ulnar C8-T1 Nml Nml Nml Nml Nml 0 Nml Nml    Right APB Median C8-T1 Nml Nml Nml Nml Nml 0 Nml Nml            Waveforms:    Motor              Sensory

## 2023-05-10 ENCOUNTER — OFFICE VISIT (OUTPATIENT)
Dept: ORTHOPEDICS | Facility: CLINIC | Age: 58
End: 2023-05-10
Payer: COMMERCIAL

## 2023-05-10 VITALS — HEIGHT: 66 IN | WEIGHT: 170 LBS | BODY MASS INDEX: 27.32 KG/M2

## 2023-05-10 DIAGNOSIS — G56.03 CARPAL TUNNEL SYNDROME, BILATERAL: Primary | ICD-10-CM

## 2023-05-10 PROCEDURE — 1160F PR REVIEW ALL MEDS BY PRESCRIBER/CLIN PHARMACIST DOCUMENTED: ICD-10-PCS | Mod: CPTII,S$GLB,, | Performed by: ORTHOPAEDIC SURGERY

## 2023-05-10 PROCEDURE — 1160F RVW MEDS BY RX/DR IN RCRD: CPT | Mod: CPTII,S$GLB,, | Performed by: ORTHOPAEDIC SURGERY

## 2023-05-10 PROCEDURE — 99215 OFFICE O/P EST HI 40 MIN: CPT | Mod: S$GLB,,, | Performed by: ORTHOPAEDIC SURGERY

## 2023-05-10 PROCEDURE — 99999 PR PBB SHADOW E&M-EST. PATIENT-LVL IV: CPT | Mod: PBBFAC,,, | Performed by: ORTHOPAEDIC SURGERY

## 2023-05-10 PROCEDURE — 99215 PR OFFICE/OUTPT VISIT, EST, LEVL V, 40-54 MIN: ICD-10-PCS | Mod: S$GLB,,, | Performed by: ORTHOPAEDIC SURGERY

## 2023-05-10 PROCEDURE — 1159F MED LIST DOCD IN RCRD: CPT | Mod: CPTII,S$GLB,, | Performed by: ORTHOPAEDIC SURGERY

## 2023-05-10 PROCEDURE — 99999 PR PBB SHADOW E&M-EST. PATIENT-LVL IV: ICD-10-PCS | Mod: PBBFAC,,, | Performed by: ORTHOPAEDIC SURGERY

## 2023-05-10 PROCEDURE — 3008F PR BODY MASS INDEX (BMI) DOCUMENTED: ICD-10-PCS | Mod: CPTII,S$GLB,, | Performed by: ORTHOPAEDIC SURGERY

## 2023-05-10 PROCEDURE — 1159F PR MEDICATION LIST DOCUMENTED IN MEDICAL RECORD: ICD-10-PCS | Mod: CPTII,S$GLB,, | Performed by: ORTHOPAEDIC SURGERY

## 2023-05-10 PROCEDURE — 3008F BODY MASS INDEX DOCD: CPT | Mod: CPTII,S$GLB,, | Performed by: ORTHOPAEDIC SURGERY

## 2023-05-10 NOTE — H&P (VIEW-ONLY)
Jerry Turner presents for follow up evaluation of   Encounter Diagnosis   Name Primary?    Carpal tunnel syndrome, bilateral Yes   The patient has had an EMG/NCS which we reviewed together today and it showed:     EMG results 5.8.23    He continues to have bilateral hand numbness and right hand weakness that is interfering with his daily activities.    PE:    AA&O x 4.  NAD  HEENT:  NCAT, sclera nonicteric  Lungs:  Respirations are equal and unlabored.  CV:  2+ bilateral upper and lower extremity pulses.  MSK:  Positive compression, Tinel's and Phalen's bilateral wrists.  Neurovascularly intact bilaterally.  5/5 thenar and intrinsic musculature strength.  Full range of motion hands, wrists and elbows.    A/P: Bilateral carpal tunnel syndrome  We have discussed conservative vs. surgical treatment as well as risks, benefits and alternatives for bilateral carpal tunnel syndrome.  He has exhausted conservative measures and would like to proceed with surgery. Surgery would include right carpal tunnel release, left carpal tunnel steroid injection.     We have discussed risks of hand surgery which include but are not limited to blood clots in the legs that can travel to the lungs (pulmonary embolism). Pulmonary embolism can cause shortness of breath, chest pain, and even shock. Other risks include urinary tract infection, nausea and vomiting (usually related to pain medication), chronic pain, bleeding, nerve damage, blood vessel injury, scarring and infection of the hand which can require re-operation. Furthermore, the risks of anesthesia include potential heart, lung, kidney, and liver damage.  Informed consent was obtained.  he understands and would like to proceed with surgery in the near future.    Call with any questions/concerns in the interim        Joyce Hilario MD    Please be aware that this note has been generated with the assistance of [facility] voice-to-text.  Please excuse any spelling or grammatical errors.

## 2023-05-10 NOTE — PROGRESS NOTES
Jerry Turner presents for follow up evaluation of   Encounter Diagnosis   Name Primary?    Carpal tunnel syndrome, bilateral Yes   The patient has had an EMG/NCS which we reviewed together today and it showed:     EMG results 5.8.23    He continues to have bilateral hand numbness and right hand weakness that is interfering with his daily activities.    PE:    AA&O x 4.  NAD  HEENT:  NCAT, sclera nonicteric  Lungs:  Respirations are equal and unlabored.  CV:  2+ bilateral upper and lower extremity pulses.  MSK:  Positive compression, Tinel's and Phalen's bilateral wrists.  Neurovascularly intact bilaterally.  5/5 thenar and intrinsic musculature strength.  Full range of motion hands, wrists and elbows.    A/P: Bilateral carpal tunnel syndrome  We have discussed conservative vs. surgical treatment as well as risks, benefits and alternatives for bilateral carpal tunnel syndrome.  He has exhausted conservative measures and would like to proceed with surgery. Surgery would include right carpal tunnel release, left carpal tunnel steroid injection.     We have discussed risks of hand surgery which include but are not limited to blood clots in the legs that can travel to the lungs (pulmonary embolism). Pulmonary embolism can cause shortness of breath, chest pain, and even shock. Other risks include urinary tract infection, nausea and vomiting (usually related to pain medication), chronic pain, bleeding, nerve damage, blood vessel injury, scarring and infection of the hand which can require re-operation. Furthermore, the risks of anesthesia include potential heart, lung, kidney, and liver damage.  Informed consent was obtained.  he understands and would like to proceed with surgery in the near future.    Call with any questions/concerns in the interim        Joyce Hilario MD    Please be aware that this note has been generated with the assistance of [facility] voice-to-text.  Please excuse any spelling or grammatical errors.

## 2023-05-15 ENCOUNTER — HOSPITAL ENCOUNTER (OUTPATIENT)
Dept: RADIOLOGY | Facility: HOSPITAL | Age: 58
Discharge: HOME | End: 2023-05-15
Attending: DERMATOLOGY
Payer: COMMERCIAL

## 2023-05-15 ENCOUNTER — ANESTHESIA EVENT (OUTPATIENT)
Dept: SURGERY | Facility: HOSPITAL | Age: 58
End: 2023-05-15
Payer: COMMERCIAL

## 2023-05-15 DIAGNOSIS — Z79.899 ENCOUNTER FOR LONG-TERM (CURRENT) USE OF HIGH-RISK MEDICATION: ICD-10-CM

## 2023-05-15 PROCEDURE — 71046 X-RAY EXAM CHEST 2 VIEWS: CPT | Mod: 26,,, | Performed by: RADIOLOGY

## 2023-05-15 PROCEDURE — 71046 X-RAY EXAM CHEST 2 VIEWS: CPT | Mod: TC,FY,PO

## 2023-05-15 PROCEDURE — 71046 XR CHEST PA AND LATERAL: ICD-10-PCS | Mod: 26,,, | Performed by: RADIOLOGY

## 2023-05-16 ENCOUNTER — PATIENT MESSAGE (OUTPATIENT)
Dept: ADMINISTRATIVE | Facility: OTHER | Age: 58
End: 2023-05-16
Payer: COMMERCIAL

## 2023-05-22 ENCOUNTER — TELEPHONE (OUTPATIENT)
Dept: ORTHOPEDICS | Facility: CLINIC | Age: 58
End: 2023-05-22
Payer: COMMERCIAL

## 2023-05-22 RX ORDER — ONDANSETRON HYDROCHLORIDE 8 MG/1
8 TABLET, FILM COATED ORAL EVERY 8 HOURS PRN
Qty: 25 TABLET | Refills: 0 | Status: SHIPPED | OUTPATIENT
Start: 2023-05-22 | End: 2023-12-07

## 2023-05-22 RX ORDER — TRAMADOL HYDROCHLORIDE 50 MG/1
50 TABLET ORAL EVERY 6 HOURS
Qty: 25 TABLET | Refills: 0 | Status: SHIPPED | OUTPATIENT
Start: 2023-05-22 | End: 2023-12-07

## 2023-05-22 NOTE — TELEPHONE ENCOUNTER
Spoke to patient to inform them of their surgery arrival time (5:00am), [place], [address] [facility] [address]:  Verbalized understanding of instructions for pre-wash, and reviewed NPO after midnight.   Confirmed call in regards to medication instructions from anesthesia.   Confirmed patient was NOT using a rideshare service for surgery arrival or  transportation.   Discussed removing any finger nail polish if applicable         The patient verbalized understanding and has no further questions.

## 2023-05-23 ENCOUNTER — HOSPITAL ENCOUNTER (OUTPATIENT)
Facility: HOSPITAL | Age: 58
Discharge: HOME | End: 2023-05-23
Attending: ORTHOPAEDIC SURGERY | Admitting: ORTHOPAEDIC SURGERY
Payer: COMMERCIAL

## 2023-05-23 ENCOUNTER — ANESTHESIA (OUTPATIENT)
Dept: SURGERY | Facility: HOSPITAL | Age: 58
End: 2023-05-23
Payer: COMMERCIAL

## 2023-05-23 VITALS
TEMPERATURE: 98 F | OXYGEN SATURATION: 100 % | RESPIRATION RATE: 20 BRPM | DIASTOLIC BLOOD PRESSURE: 100 MMHG | WEIGHT: 170 LBS | HEIGHT: 66 IN | BODY MASS INDEX: 27.32 KG/M2 | HEART RATE: 57 BPM | SYSTOLIC BLOOD PRESSURE: 171 MMHG

## 2023-05-23 DIAGNOSIS — G56.01 RIGHT CARPAL TUNNEL SYNDROME: Primary | ICD-10-CM

## 2023-05-23 PROBLEM — G56.03 CARPAL TUNNEL SYNDROME, BILATERAL: Status: ACTIVE | Noted: 2023-05-23

## 2023-05-23 PROCEDURE — 64721 PR REVISE MEDIAN N/CARPAL TUNNEL SURG: ICD-10-PCS | Mod: RT,,, | Performed by: ORTHOPAEDIC SURGERY

## 2023-05-23 PROCEDURE — 64721 CARPAL TUNNEL SURGERY: CPT | Mod: RT,,, | Performed by: ORTHOPAEDIC SURGERY

## 2023-05-23 PROCEDURE — 99900035 HC TECH TIME PER 15 MIN (STAT)

## 2023-05-23 PROCEDURE — 71000033 HC RECOVERY, INTIAL HOUR: Performed by: ORTHOPAEDIC SURGERY

## 2023-05-23 PROCEDURE — 63600175 PHARM REV CODE 636 W HCPCS: Performed by: ORTHOPAEDIC SURGERY

## 2023-05-23 PROCEDURE — 63600175 PHARM REV CODE 636 W HCPCS: Performed by: NURSE ANESTHETIST, CERTIFIED REGISTERED

## 2023-05-23 PROCEDURE — 20526 PR INJECT CARPAL TUNNEL: ICD-10-PCS | Mod: 59,LT,, | Performed by: ORTHOPAEDIC SURGERY

## 2023-05-23 PROCEDURE — D9220A PRA ANESTHESIA: Mod: CRNA,,, | Performed by: NURSE ANESTHETIST, CERTIFIED REGISTERED

## 2023-05-23 PROCEDURE — 20526 THER INJECTION CARP TUNNEL: CPT | Mod: 59,LT,, | Performed by: ORTHOPAEDIC SURGERY

## 2023-05-23 PROCEDURE — 37000008 HC ANESTHESIA 1ST 15 MINUTES: Performed by: ORTHOPAEDIC SURGERY

## 2023-05-23 PROCEDURE — 37000009 HC ANESTHESIA EA ADD 15 MINS: Performed by: ORTHOPAEDIC SURGERY

## 2023-05-23 PROCEDURE — 94761 N-INVAS EAR/PLS OXIMETRY MLT: CPT

## 2023-05-23 PROCEDURE — 36000706: Performed by: ORTHOPAEDIC SURGERY

## 2023-05-23 PROCEDURE — 25000003 PHARM REV CODE 250: Performed by: NURSE ANESTHETIST, CERTIFIED REGISTERED

## 2023-05-23 PROCEDURE — D9220A PRA ANESTHESIA: ICD-10-PCS | Mod: ANES,,, | Performed by: ANESTHESIOLOGY

## 2023-05-23 PROCEDURE — 36000707: Performed by: ORTHOPAEDIC SURGERY

## 2023-05-23 PROCEDURE — D9220A PRA ANESTHESIA: ICD-10-PCS | Mod: CRNA,,, | Performed by: NURSE ANESTHETIST, CERTIFIED REGISTERED

## 2023-05-23 PROCEDURE — 25000003 PHARM REV CODE 250: Performed by: ORTHOPAEDIC SURGERY

## 2023-05-23 PROCEDURE — 25000003 PHARM REV CODE 250: Performed by: ANESTHESIOLOGY

## 2023-05-23 PROCEDURE — D9220A PRA ANESTHESIA: Mod: ANES,,, | Performed by: ANESTHESIOLOGY

## 2023-05-23 PROCEDURE — 71000015 HC POSTOP RECOV 1ST HR: Performed by: ORTHOPAEDIC SURGERY

## 2023-05-23 PROCEDURE — 27201423 OPTIME MED/SURG SUP & DEVICES STERILE SUPPLY: Performed by: ORTHOPAEDIC SURGERY

## 2023-05-23 RX ORDER — MIDAZOLAM HYDROCHLORIDE 1 MG/ML
INJECTION, SOLUTION INTRAMUSCULAR; INTRAVENOUS
Status: DISCONTINUED | OUTPATIENT
Start: 2023-05-23 | End: 2023-05-23

## 2023-05-23 RX ORDER — FENTANYL CITRATE 50 UG/ML
100 INJECTION, SOLUTION INTRAMUSCULAR; INTRAVENOUS ONCE
Status: DISCONTINUED | OUTPATIENT
Start: 2023-05-23 | End: 2023-05-23 | Stop reason: HOSPADM

## 2023-05-23 RX ORDER — FAMOTIDINE 10 MG/ML
INJECTION INTRAVENOUS
Status: DISCONTINUED | OUTPATIENT
Start: 2023-05-23 | End: 2023-05-23

## 2023-05-23 RX ORDER — METHYLPREDNISOLONE ACETATE 40 MG/ML
INJECTION, SUSPENSION INTRA-ARTICULAR; INTRALESIONAL; INTRAMUSCULAR; SOFT TISSUE
Status: DISCONTINUED | OUTPATIENT
Start: 2023-05-23 | End: 2023-05-23 | Stop reason: HOSPADM

## 2023-05-23 RX ORDER — CARBOXYMETHYLCELLULOSE SODIUM 10 MG/ML
GEL OPHTHALMIC
Status: DISCONTINUED | OUTPATIENT
Start: 2023-05-23 | End: 2023-05-23

## 2023-05-23 RX ORDER — BACITRACIN ZINC AND POLYMYXIN B SULFATE 500; 1000 [USP'U]/G; [USP'U]/G
OINTMENT TOPICAL
Status: DISCONTINUED | OUTPATIENT
Start: 2023-05-23 | End: 2023-05-23 | Stop reason: HOSPADM

## 2023-05-23 RX ORDER — MUPIROCIN 20 MG/G
OINTMENT TOPICAL
Status: DISCONTINUED | OUTPATIENT
Start: 2023-05-23 | End: 2023-05-23 | Stop reason: HOSPADM

## 2023-05-23 RX ORDER — MIDAZOLAM HYDROCHLORIDE 1 MG/ML
0.5 INJECTION INTRAMUSCULAR; INTRAVENOUS
Status: DISCONTINUED | OUTPATIENT
Start: 2023-05-23 | End: 2023-05-23 | Stop reason: HOSPADM

## 2023-05-23 RX ORDER — HYDROCODONE BITARTRATE AND ACETAMINOPHEN 5; 325 MG/1; MG/1
1 TABLET ORAL EVERY 4 HOURS PRN
Status: DISCONTINUED | OUTPATIENT
Start: 2023-05-23 | End: 2023-05-23 | Stop reason: HOSPADM

## 2023-05-23 RX ORDER — FENTANYL CITRATE 50 UG/ML
INJECTION, SOLUTION INTRAMUSCULAR; INTRAVENOUS
Status: DISCONTINUED | OUTPATIENT
Start: 2023-05-23 | End: 2023-05-23

## 2023-05-23 RX ORDER — MUPIROCIN 20 MG/G
OINTMENT TOPICAL 2 TIMES DAILY
Status: DISCONTINUED | OUTPATIENT
Start: 2023-05-23 | End: 2023-05-23 | Stop reason: HOSPADM

## 2023-05-23 RX ORDER — CELECOXIB 200 MG/1
400 CAPSULE ORAL ONCE
Status: COMPLETED | OUTPATIENT
Start: 2023-05-23 | End: 2023-05-23

## 2023-05-23 RX ORDER — DIPHENHYDRAMINE HYDROCHLORIDE 50 MG/ML
25 INJECTION INTRAMUSCULAR; INTRAVENOUS EVERY 6 HOURS PRN
Status: DISCONTINUED | OUTPATIENT
Start: 2023-05-23 | End: 2023-05-23 | Stop reason: HOSPADM

## 2023-05-23 RX ORDER — PROPOFOL 10 MG/ML
VIAL (ML) INTRAVENOUS
Status: DISCONTINUED | OUTPATIENT
Start: 2023-05-23 | End: 2023-05-23

## 2023-05-23 RX ORDER — LIDOCAINE HYDROCHLORIDE 20 MG/ML
INJECTION INTRAVENOUS
Status: DISCONTINUED | OUTPATIENT
Start: 2023-05-23 | End: 2023-05-23

## 2023-05-23 RX ORDER — SODIUM CHLORIDE 9 MG/ML
INJECTION, SOLUTION INTRAVENOUS CONTINUOUS
Status: DISCONTINUED | OUTPATIENT
Start: 2023-05-23 | End: 2023-05-23 | Stop reason: HOSPADM

## 2023-05-23 RX ORDER — BUPIVACAINE HYDROCHLORIDE 2.5 MG/ML
INJECTION, SOLUTION EPIDURAL; INFILTRATION; INTRACAUDAL
Status: DISCONTINUED | OUTPATIENT
Start: 2023-05-23 | End: 2023-05-23 | Stop reason: HOSPADM

## 2023-05-23 RX ORDER — ACETAMINOPHEN 500 MG
1000 TABLET ORAL ONCE
Status: COMPLETED | OUTPATIENT
Start: 2023-05-23 | End: 2023-05-23

## 2023-05-23 RX ORDER — LORAZEPAM 2 MG/ML
0.25 INJECTION INTRAMUSCULAR ONCE AS NEEDED
Status: DISCONTINUED | OUTPATIENT
Start: 2023-05-23 | End: 2023-05-23 | Stop reason: HOSPADM

## 2023-05-23 RX ORDER — ONDANSETRON 2 MG/ML
INJECTION INTRAMUSCULAR; INTRAVENOUS
Status: DISCONTINUED | OUTPATIENT
Start: 2023-05-23 | End: 2023-05-23

## 2023-05-23 RX ORDER — FENTANYL CITRATE 50 UG/ML
25 INJECTION, SOLUTION INTRAMUSCULAR; INTRAVENOUS EVERY 5 MIN PRN
Status: DISCONTINUED | OUTPATIENT
Start: 2023-05-23 | End: 2023-05-23 | Stop reason: HOSPADM

## 2023-05-23 RX ORDER — DEXAMETHASONE SODIUM PHOSPHATE 4 MG/ML
INJECTION, SOLUTION INTRA-ARTICULAR; INTRALESIONAL; INTRAMUSCULAR; INTRAVENOUS; SOFT TISSUE
Status: DISCONTINUED | OUTPATIENT
Start: 2023-05-23 | End: 2023-05-23

## 2023-05-23 RX ORDER — PROPOFOL 10 MG/ML
VIAL (ML) INTRAVENOUS CONTINUOUS PRN
Status: DISCONTINUED | OUTPATIENT
Start: 2023-05-23 | End: 2023-05-23

## 2023-05-23 RX ORDER — LIDOCAINE HYDROCHLORIDE 10 MG/ML
INJECTION, SOLUTION EPIDURAL; INFILTRATION; INTRACAUDAL; PERINEURAL
Status: DISCONTINUED | OUTPATIENT
Start: 2023-05-23 | End: 2023-05-23 | Stop reason: HOSPADM

## 2023-05-23 RX ADMIN — ACETAMINOPHEN 1000 MG: 500 TABLET ORAL at 06:05

## 2023-05-23 RX ADMIN — MUPIROCIN: 20 OINTMENT TOPICAL at 05:05

## 2023-05-23 RX ADMIN — PROPOFOL 50 MCG/KG/MIN: 10 INJECTION, EMULSION INTRAVENOUS at 07:05

## 2023-05-23 RX ADMIN — DEXAMETHASONE SODIUM PHOSPHATE 4 MG: 4 INJECTION, SOLUTION INTRAMUSCULAR; INTRAVENOUS at 07:05

## 2023-05-23 RX ADMIN — PROPOFOL 50 MG: 10 INJECTION, EMULSION INTRAVENOUS at 07:05

## 2023-05-23 RX ADMIN — SODIUM CHLORIDE: 0.9 INJECTION, SOLUTION INTRAVENOUS at 05:05

## 2023-05-23 RX ADMIN — FENTANYL CITRATE 25 MCG: 50 INJECTION, SOLUTION INTRAMUSCULAR; INTRAVENOUS at 07:05

## 2023-05-23 RX ADMIN — SODIUM CHLORIDE: 9 INJECTION, SOLUTION INTRAVENOUS at 07:05

## 2023-05-23 RX ADMIN — CELECOXIB 400 MG: 200 CAPSULE ORAL at 06:05

## 2023-05-23 RX ADMIN — LIDOCAINE HYDROCHLORIDE 40 MG: 20 INJECTION INTRAVENOUS at 07:05

## 2023-05-23 RX ADMIN — CEFAZOLIN 2 G: 2 INJECTION, POWDER, FOR SOLUTION INTRAMUSCULAR; INTRAVENOUS at 07:05

## 2023-05-23 RX ADMIN — CARBOXYMETHYLCELLULOSE SODIUM 2 DROP: 10 GEL OPHTHALMIC at 07:05

## 2023-05-23 RX ADMIN — FAMOTIDINE 20 MG: 10 INJECTION, SOLUTION INTRAVENOUS at 07:05

## 2023-05-23 RX ADMIN — MIDAZOLAM HYDROCHLORIDE 2 MG: 1 INJECTION, SOLUTION INTRAMUSCULAR; INTRAVENOUS at 07:05

## 2023-05-23 RX ADMIN — ONDANSETRON 4 MG: 2 INJECTION, SOLUTION INTRAMUSCULAR; INTRAVENOUS at 07:05

## 2023-05-23 NOTE — PLAN OF CARE
Preop complete. Pt stated he did not need an . Pt understands english. Resting comfortably. Belongings secured in locker. Call light in reach. Side rails up x2.

## 2023-05-23 NOTE — BRIEF OP NOTE
[place] - [facility] ([facility])  Surgery Department  Operative Note    SUMMARY     Date of Procedure: 5/23/2023     Procedure: Procedure(s) (LRB):  RELEASE, CARPAL TUNNEL (Right)  INJECTION, STEROID (Left)     Surgeon(s) and Role:     * Joyce Hilario MD - Primary    Assisting Surgeon: None    Pre-Operative Diagnosis: Carpal tunnel syndrome, bilateral [G56.03]    Post-Operative Diagnosis: Post-Op Diagnosis Codes:     * Carpal tunnel syndrome, bilateral [G56.03]    Anesthesia: Local MAC      Estimated Blood Loss (EBL): none           Implants: * No implants in log *    Specimens:   Specimen (24h ago, onward)      None                    Condition: Good    Disposition: PACU - hemodynamically stable.    Attestation: I was present and scrubbed for the entire procedure.

## 2023-05-23 NOTE — DISCHARGE INSTRUCTIONS
HAND SURGERY  Care of the Hand after Surgery       Post-Op Care  It is important to follow your orthopaedic surgeon's instructions carefully after you return home. You should ask   someone to check on you that evening. The protocols described here are general in nature. Every person and   every surgery is different so the information given here is for guidance only. If you have questions you should   contact us.     Day of Surgery  The hand may be placed in an ace wrap or a hard splint to protect any surgical repair that was performed at the time of surgery. Do not remove the splint or dressing until you are seen for your first postop appointment.  The hand and fingers may be numb for quite some time after surgery. This is due to the local anesthetic used at the time of surgery for pain control.  Begin taking liquids and food as soon as you can. You should always eat some solid food, a sandwich or light meal, a little while before taking your pain meds.     Day 3  Things are much the same on the third day after your surgery. Usually you have less pain and feel like doing more.  Showering: It is important to keep the incision absolutely dry while showering or bathing (use two plastic bags over your hand). If you feel that the pain medication you were given after surgery is stronger than you really need you can reduce the dose, take it less frequently or switch to ibuprofen or Tylenol. If you received antibiotics they should be taken until the entire prescription is completed.    Day 6  You will be seeing therapy at this time for evaluation and to start range of motion exercises    Day 10  Therapy will take out your sutures at this time    Day 20  We will see you back in in clinc. We will review with you what was done in surgery and will talk about rehab and answer any questions you may have.       HAND SURGERY  You can drive if you are comfortable and have regained full finger movements and if you have sufficient  power to control the vehicle. Timing of your return to work is variable according to your occupation and specific surgery. We should discuss this at your follow up visit.  Hand elevation is important to prevent swelling and stiffness of the fingers. One minute of leaving your hand dangling negates four hours of keeping it elevated. Please remember not to walk with your hand hanging down or to sit with your hand resting in your lap. It is fine, however, to lower your hand for light use and you should get back to normal light activities as soon as possible as guided by common sense. There are a number of exercises you should do to prevent stiffness - you can begin these exercises day 3 postoperatively      Post-operative exercises  Bend your fingers  Relax your hand. Start with your fingers straight and close together. Bend the end and middle joints of your fingers. Keep your wrist and knuckles straight. Moving slowly and smoothly, return your hand to the starting position. Repeat with your other hand. If you can, perform multiple repetitions of this exercise on each hand.  Open your hand wide                     Spread your fingers apart as wide as you can and hold that position. Slowly relax your fingers and bring them together. Return to the open-wide position. Repeat with each hand and gradually add to the number of repetitions.         HAND SURGERY                  Post-operative Exercises  Make a fist  Start with your fingers straight and spread apart. Make a loose, gentle fist and wrap your thumb around the outside of your fingers. Be careful not to squeeze your fingers together too tightly. Moving slowly and smoothly, return to the starting position. Repeat. Perform this exercise on both hands.  Touch your fingertips  Straighten your fingers and thumb. Bend your thumb across your palm, touching the tip of your thumb to the pad of your hand just below your pinky finger. If you can't make your thumb touch, just  "stretch as far as you can. Return your thumb to its starting position, as shown in images 1 through 3.  For the next exercise, form the letter O by touching your thumb to each fingertip, as shown in images 4 through 6. Moving slowly and smoothly, touch your index finger to your thumb, then straighten your fingers. Touch your middle finger to your thumb and straighten. Follow with your ring and pinky fingers.  Walk your fingers  Rest your hand on a flat surface, such as a tabletop, with your palm facing down and your fingers spread slightly apart. Moving one finger at a time, slowly walk your fingers toward your thumb. Start by lifting and moving your index finger toward your thumb. Follow by lifting and moving your middle finger toward your thumb. Proceed with moving your ring finger and then your pinky finger toward your thumb. Don't move your wrist or thumb while doing this exercise. Repeat with your other hand.            HAND SURGERY    Common Concerns and Frequently Asked Questions      When to Call the Doctor  Pain, burning, or numbness of the fingers or the back of the hand not relieved by elevation of the arm  Pale or cold finger; bluish nail beds  Red line going up the arm  Excessive swelling  Fever over 101.3ºF  Pain unrelieved by pain medication     HAND SURGERY    Tips for one armed living  It helps to have...   In the shower   Plastic bags and rubber bands to cover bandages - the bags that newspapers come in are good to cover the hand and wrist. Otherwise small trash can liners will do. Use two at a time.   Bottle sponge (soft sponge on a long stick) - for the armpit of your "good" hand.   Shower brush   A hair brush in the shower will help you to wash your hair.   Cotton nereida cloth bathrobe - to dry your back.    In the bathroom   Toothpaste, shampoo, etc. in flip-top or pump (not screw top) dispensers.   Consider an electric razor.   Flossers (dental floss on a "Y" shaped handle).    In the kitchen " "  Dycem mat (rubber jar opener mat) - to help open jars, but also keep things from sliding around while you are working on them.    Double suction cup pads ("little Octopus") - to hold items while you use or wash them.   Electric can opener with a lid magnet strong enough to hold the can in the air - for one handed use.   In the bedroom   Back scratcher.   Large sleeve shirts and tops.   Put away clothing which buttons, fastens or snaps in the back or which uses drawstrings.    Sports bra or a camisole instead of a bra.   [employer] nylons can be pulled on one handed - most others can't.   A "wash and wear" haircut.    "

## 2023-05-23 NOTE — ANESTHESIA PREPROCEDURE EVALUATION
05/23/2023  Jerry Turner is a 57 y.o., male.      Pre-op Assessment    I have reviewed the Patient Summary Reports.     I have reviewed the Nursing Notes. I have reviewed the NPO Status.   I have reviewed the Medications.     Review of Systems  Anesthesia Hx:  No problems with previous Anesthesia  History of prior surgery of interest to airway management or planning: Previous anesthesia: General Denies Family Hx of Anesthesia complications.   Denies Personal Hx of Anesthesia complications.   Social:  Non-Smoker    Hematology/Oncology:  Hematology Normal   Oncology Normal     EENT/Dental:EENT/Dental Normal   Cardiovascular:  Cardiovascular Normal Exercise tolerance: good     Pulmonary:  Pulmonary Normal    Renal/:  Renal/ Normal     Hepatic/GI:  Hepatic/GI Normal    Musculoskeletal:  Musculoskeletal Normal    Neurological:  Neurology Normal    Endocrine:  Endocrine Normal    Dermatological:   Psoriasis   Psych:  Psychiatric Normal           Physical Exam  General: Well nourished, Cooperative, Alert and Oriented    Airway:  Mallampati: III / II  Mouth Opening: Normal  TM Distance: Normal  Tongue: Normal  Neck ROM: Normal ROM    Dental:  Intact, Caps / Implants    Chest/Lungs:  Clear to auscultation, Normal Respiratory Rate    Heart:  Rate: Normal  Rhythm: Regular Rhythm  Sounds: Normal    Abdomen:  Normal, Soft, Nontender        Anesthesia Plan  Type of Anesthesia, risks & benefits discussed:    Anesthesia Type: MAC, Gen Natural Airway  Intra-op Monitoring Plan: Standard ASA Monitors  Post Op Pain Control Plan: multimodal analgesia  Induction:  IV  Airway Plan: Direct, Post-Induction  Informed Consent: Informed consent signed with the Patient and all parties understand the risks and agree with anesthesia plan.  All questions answered.   ASA Score: 1    Ready For Surgery From Anesthesia Perspective.     .

## 2023-05-23 NOTE — ANESTHESIA POSTPROCEDURE EVALUATION
Anesthesia Post Evaluation    Patient: Jerry Turner    Procedure(s) Performed: Procedure(s) (LRB):  RELEASE, CARPAL TUNNEL (Right)  INJECTION, STEROID (Left)    Final Anesthesia Type: general      Patient location during evaluation: PACU  Patient participation: Yes- Able to Participate  Level of consciousness: awake and alert and oriented  Post-procedure vital signs: reviewed and stable  Pain management: adequate  Airway patency: patent    PONV status at discharge: No PONV  Anesthetic complications: no      Cardiovascular status: hemodynamically stable  Respiratory status: unassisted, spontaneous ventilation and room air  Hydration status: euvolemic  Follow-up not needed.          Vitals Value Taken Time   /100 05/23/23 0832   Temp 36.7 °C (98 °F) 05/23/23 0830   Pulse 56 05/23/23 0835   Resp 22 05/23/23 0830   SpO2 100 % 05/23/23 0835   Vitals shown include unvalidated device data.      Event Time   Out of Recovery 08:30:00         Pain/Valencia Score: Pain Rating Prior to Med Admin: 0 (5/23/2023  6:10 AM)  Valencia Score: 10 (5/23/2023  7:52 AM)

## 2023-05-23 NOTE — TRANSFER OF CARE
"Anesthesia Transfer of Care Note    Patient: Jerry Turner    Procedure(s) Performed: Procedure(s) (LRB):  RELEASE, CARPAL TUNNEL (Right)  INJECTION, STEROID (Left)    Patient location: PACU    Anesthesia Type: general    Transport from OR: Transported from OR on room air with adequate spontaneous ventilation    Post pain: adequate analgesia    Post assessment: no apparent anesthetic complications    Post vital signs: stable    Level of consciousness: awake and sedated    Nausea/Vomiting: no nausea/vomiting    Complications: none    Transfer of care protocol was followed      Last vitals:   Visit Vitals  BP (!) 147/93   Pulse (!) 58   Temp 36.4 °C (97.5 °F) (Oral)   Resp 16   Ht 5' 6" (1.676 m)   Wt 77.1 kg (170 lb)   SpO2 97%   BMI 27.44 kg/m²     "

## 2023-05-23 NOTE — OP NOTE
[facility])  Surgery Department  Operative Note    SUMMARY     Date of Procedure: 5/23/2023     Procedure:   1. Right carpal tunnel release, cpt 19698  2. Left carpal tunnel steroid injection, cpt 78295    Surgeon(s) and Role:     * Joyce Hilario MD - Primary    Assisting Surgeon: None    Pre-Operative Diagnosis: Carpal tunnel syndrome, bilateral [G56.03]    Post-Operative Diagnosis: Post-Op Diagnosis Codes:     * Carpal tunnel syndrome, bilateral [G56.03]    Anesthesia: Local MAC    Indication for Procedure: 56 yo male with bilateral carpal tunnel syndrome confirmed by EMG/NCS and had failed conservative treatment. Risks and benefits of the procedure were discussed with the patient and informed consent was obtained.    Description of the Findings of the Procedure: The patient was seen in the preoperative holding area and the right hand was marked. The patient was taken to the OR, placed supine on the table, and a padded hand table was used. After monitored anesthesia care was admitted without difficulty, a time-out procedure was performed identifying the patient, the operative site and the procedure to be performed. 40 mg of methylprednisolone was sterilely injected into the left carpal tunnel. A tourniquet was placed on the arm and the upper extremity was prepped and draped in standard sterile fashion. The upper extremity was elevated and exsanguinated with an Esmarch bandage, and the tourniquet was inflated to 250 mm Hg. 10cc of 1% lidocaine plain and 0.25% bupivacaine was used for a local block of the carpal tunnel. A 2 cm incision was made over the right carpal tunnel.  The palmar fascia was sharply incised.  Jay retractors were used to expose the transverse carpal ligament, this was sharply incised.  A carpal tunnel skid was placed underneath the transverse carpal ligament proximally, and the proximal transverse carpal ligament as well as the distal forearm fascia was sharply released. The  median nerve was found to be significantly compressed through the carpal tunnel. The carpal tunnel skid was replaced distally, and the transverse carpal ligament was released under direct visualization.  The median nerve was found to be completely decompressed. The neurovascular bundles were identified and protected throughout the case. The wound was then irrigated with normal saline using a bulb syringe. 3-0 prolene was used to close the skin in a horizontal mattress fashion. Adaptic, 4x4, cast padding and coban were used to dress the hand. The tourniquet was deflated and the hand and fingers were pink and well-perfused.  All instrument, sponge and needle counts were correct. The patient tolerated the procedure well.  He was taken awake, alert and in good condition to the recovery room.    Complications: No    Estimated Blood Loss (EBL): none           Specimens: none           Condition: Good    Disposition: PACU - hemodynamically stable.    Attestation: I was present and scrubbed for the entire procedure.

## 2023-05-23 NOTE — DISCHARGE SUMMARY
[facility] - Surgery (Hospital)  Discharge Note  Short Stay    Procedure(s) (LRB):  RELEASE, CARPAL TUNNEL (Right)  INJECTION, STEROID (Left)      OUTCOME: Patient tolerated treatment/procedure well without complication and is now ready for discharge.    DISPOSITION: Home or Self Care    FINAL DIAGNOSIS:  right carpal tunnel syndrome, left carpal tunnel syndrome    FOLLOWUP: In clinic    DISCHARGE INSTRUCTIONS:    Discharge Procedure Orders   Diet general     Keep surgical extremity elevated     Ice to affected area     Lifting restrictions     No driving, operating heavy equipment or signing legal documents while taking pain medication.     Leave dressing on - Keep it clean, dry, and intact until clinic visit     Call MD for:  temperature >100.4     Call MD for:  persistent nausea and vomiting     Call MD for:  severe uncontrolled pain     Call MD for:  difficulty breathing, headache or visual disturbances     Call MD for:  redness, tenderness, or signs of infection (pain, swelling, redness, odor or green/yellow discharge around incision site)     Call MD for:  hives     Call MD for:  persistent dizziness or light-headedness     Call MD for:  extreme fatigue

## 2023-05-27 ENCOUNTER — NURSE TRIAGE (OUTPATIENT)
Dept: ADMINISTRATIVE | Facility: CLINIC | Age: 58
End: 2023-05-27
Payer: COMMERCIAL

## 2023-05-27 ENCOUNTER — HOSPITAL ENCOUNTER (EMERGENCY)
Facility: HOSPITAL | Age: 58
Discharge: LEFT BEFORE DISCHARGE | End: 2023-05-27
Attending: EMERGENCY MEDICINE
Payer: COMMERCIAL

## 2023-05-27 VITALS
OXYGEN SATURATION: 99 % | TEMPERATURE: 98 F | DIASTOLIC BLOOD PRESSURE: 91 MMHG | RESPIRATION RATE: 16 BRPM | HEIGHT: 66 IN | HEART RATE: 80 BPM | BODY MASS INDEX: 27.32 KG/M2 | WEIGHT: 170 LBS | SYSTOLIC BLOOD PRESSURE: 177 MMHG

## 2023-05-27 DIAGNOSIS — G89.18 POST-OP PAIN: ICD-10-CM

## 2023-05-27 DIAGNOSIS — Z53.21 ELOPED FROM EMERGENCY DEPARTMENT: Primary | ICD-10-CM

## 2023-05-27 PROCEDURE — 99284 EMERGENCY DEPT VISIT MOD MDM: CPT | Mod: ,,, | Performed by: PHYSICIAN ASSISTANT

## 2023-05-27 PROCEDURE — 25000003 PHARM REV CODE 250: Performed by: PHYSICIAN ASSISTANT

## 2023-05-27 PROCEDURE — 99284 PR EMERGENCY DEPT VISIT,LEVEL IV: ICD-10-PCS | Mod: ,,, | Performed by: PHYSICIAN ASSISTANT

## 2023-05-27 PROCEDURE — 99283 EMERGENCY DEPT VISIT LOW MDM: CPT

## 2023-05-27 RX ORDER — OXYCODONE AND ACETAMINOPHEN 5; 325 MG/1; MG/1
1 TABLET ORAL
Status: COMPLETED | OUTPATIENT
Start: 2023-05-27 | End: 2023-05-27

## 2023-05-27 RX ADMIN — OXYCODONE HYDROCHLORIDE AND ACETAMINOPHEN 1 TABLET: 5; 325 TABLET ORAL at 10:05

## 2023-05-27 NOTE — TELEPHONE ENCOUNTER
Attempted to call x 3 with  service on the phone no answer. LVM to call back if further assistance is needed.    Benito ID# [id number].  Reason for Disposition   Third attempt to contact caller AND no contact made. Phone number verified.    Protocols used: No Contact or Duplicate Contact Call-A-AH

## 2023-05-28 NOTE — TELEPHONE ENCOUNTER
Pt and daughter calling, c/o Post op arm pain, 10/10 pain. Pt states took rx and tylenol every 6 hours, no relief. Per protocol, call pcp now. Page sent to on call Ortho. Reminded pt to elevate arm and apply ice pack per surgeon discharge instructions. Also advised to monitor for worsening symptoms such as fever. Pt and family verbalize understanding. Secure chat sent to MD on call, Dr. BABATUNDE Han, states ED for pt. Pt and family verbalize understanding and advised to call back for any further questions or concerns.   Reason for Disposition   [1] SEVERE post-op pain (e.g., excruciating, pain scale 8-10) AND [2] not controlled with pain medications    Additional Information   Negative: Sounds like a life-threatening emergency to the triager   Negative: [1] Widespread rash AND [2] bright red, sunburn-like   Negative: [1] SEVERE headache AND [2] after spinal (epidural) anesthesia   Negative: [1] Vomiting AND [2] persists > 4 hours   Negative: [1] Vomiting AND [2] abdomen looks much more swollen than usual   Negative: [1] Drinking very little AND [2] dehydration suspected (e.g., no urine > 12 hours, very dry mouth, very lightheaded)   Negative: Patient sounds very sick or weak to the triager   Negative: Sounds like a serious complication to the triager   Negative: Fever > 100.4 F (38.0 C)    Protocols used: Post-Op Symptoms and [id number]

## 2023-05-28 NOTE — ED PROVIDER NOTES
Encounter Date: 5/27/2023       History     Chief Complaint   Patient presents with    Post-op Problem     Surgery on right wrist/hand Thursday. States it didn't hurt Friday now its really hurting     57-year-old male with past medical history of psoriasis, carpal tunnel status post carpal tunnel release on 05/23/23 who presents to the emergency department with chief complaint of forearm, wrist, and hand pain. He started with slight pain yesterday, however his pain is unbearable today. He tried taking tramadol and tylenol without improvement. No fever, chills, NV. He called the on call triage nurse and was instructed by orthopedics to come to the ED. He has tingling in the second and third digit at the fingertip. His hand also feels heavy. He denies other worsening or alleviating factors.     Review of patient's allergies indicates:  No Known Allergies  Past Medical History:   Diagnosis Date    Psoriasis      Past Surgical History:   Procedure Laterality Date    CARPAL TUNNEL RELEASE Right 5/23/2023    Procedure: RELEASE, CARPAL TUNNEL;  Surgeon: Joyce Hilario MD;  Location: [facility] OR;  Service: Orthopedics;  Laterality: Right;  Local injection prior to prep    COLONOSCOPY N/A 8/19/2022    Procedure: COLONOSCOPY;  Surgeon: Ning Sam MD;  Location: [facility] ([address] [place] [address]);  Service: Endoscopy;  Laterality: N/A;  Pt. is vaccinated. will bring card and show to lab day of proc.EC    Dental graft      INJECTION OF STEROID Left 5/23/2023    Procedure: INJECTION, STEROID;  Surgeon: Joyce Hilario MD;  Location: [facility] OR;  Service: Orthopedics;  Laterality: Left;    SPINE SURGERY       Family History   Problem Relation Age of Onset    Diabetes Mother     Cancer Mother         Breast Cancer    Eczema Neg Hx     Psoriasis Neg Hx      Social History     Tobacco Use    Smoking status: Every Day     Packs/day: 0.25     Types: Cigarettes    Smokeless tobacco: Never    Tobacco comments:     Varied smoking depending on stress    Substance Use Topics    Alcohol use: Yes     Alcohol/week: 3.0 - 4.0 standard drinks     Types: 3 - 4 Cans of beer per week     Comment: daily    Drug use: Never     Review of Systems   Musculoskeletal:  Positive for arthralgias.     Physical Exam     Initial Vitals [05/27/23 2213]   BP Pulse Resp Temp SpO2   (!) 177/91 80 18 97.6 °F (36.4 °C) 99 %      MAP       --         Physical Exam    Nursing note and vitals reviewed.  Constitutional: He appears well-developed and well-nourished. He is not diaphoretic. No distress.   HENT:   Head: Normocephalic and atraumatic.   Mouth/Throat: Oropharynx is clear and moist.   Eyes: EOM are normal. Pupils are equal, round, and reactive to light.   Neck: Neck supple.   Normal range of motion.  Cardiovascular:  Normal rate, regular rhythm, normal heart sounds and intact distal pulses.     Exam reveals no gallop and no friction rub.       No murmur heard.  Pulmonary/Chest: Breath sounds normal. He has no wheezes. He has no rhonchi. He has no rales.   Abdominal: Abdomen is soft. Bowel sounds are normal. There is no abdominal tenderness.   Musculoskeletal:         General: Normal range of motion.      Cervical back: Normal range of motion and neck supple.      Comments: Surgical dressing in place. 2+ distal pulses. No swelling proximally of the dressing.      Neurological: He is alert and oriented to person, place, and time. He has normal strength. GCS score is 15. GCS eye subscore is 4. GCS verbal subscore is 5. GCS motor subscore is 6.   Skin: Skin is warm and dry. Capillary refill takes less than 2 seconds.   Psychiatric: He has a normal mood and affect. His behavior is normal. Judgment and thought content normal.       ED Course   Procedures  Labs Reviewed - No data to display         Imaging Results    None          Medications   oxyCODONE-acetaminophen 5-325 mg per tablet 1 tablet (1 tablet Oral Given 5/27/23 5834)     Medical Decision Making:   History:   Old Medical  Records: I decided to obtain old medical records.  Initial Assessment:   Emergent evaluation of a 57 y.o. male presenting to the emergency department complaining of post op pain. Status post carpal tunnel release on 5/23. His pain is unbearable despite taking tramadol at home. Patient is afebrile, hemodynamically stable, and non toxic appearing.   Will order analgesia and consult orthopedics.   Differential Diagnosis:   Differential diagnosis includes but isn't limited to post op pain, cellulitis, abscess, DVT.   ED Management:  Patient presenting with post op pain to the right forearm, wrist, and hand. His hand feels heavy and he has tingling in the fingertips. He did not have significant pain on the first two days following his surgery, however it has become unbearable today. Not resolved with tramadol.     Notified by nurse that the patient reports improvement of his symptoms.  He told the nurse that he had been here too long and that he no longer wanted to wait to be evaluated by orthopedics.  He eloped from the emergency department.  I was unable to speak with him again prior to his elopement.                        Clinical Impression:   Final diagnoses:  [Z53.21] Eloped from emergency department (Primary)  [G89.18] Post-op pain        ED Disposition Condition    Eloped Stable                Tali Coffman PA-C  05/28/23 0027

## 2023-05-28 NOTE — ED NOTES
Jerry Turner, an 57 y.o. male presents to the ED     Chief Complaint   Patient presents with    Post-op Problem     Surgery on right wrist/hand Thursday. States it didn't hurt Friday now its really hurting     Review of patient's allergies indicates:  No Known Allergies  Past Medical History:   Diagnosis Date    Psoriasis

## 2023-05-29 ENCOUNTER — TELEPHONE (OUTPATIENT)
Dept: ORTHOPEDICS | Facility: CLINIC | Age: 58
End: 2023-05-29
Payer: COMMERCIAL

## 2023-05-29 NOTE — TELEPHONE ENCOUNTER
Spoke to patient and discussed his post op pain. He says the pain is improving with Tylenol but there is still post op pain. Patient is scheduled in therapy on Friday 6/2/23. He is comfortable with continuing the Tylenol but would still like a call back from Ambrosio to further discuss. We discussed his follow up appointments. Patient verbalized understanding and was thankful.      Maegan Memmezzwattay MA [facility]

## 2023-05-30 RX ORDER — OXYCODONE HYDROCHLORIDE 5 MG/1
5 TABLET ORAL EVERY 12 HOURS PRN
Qty: 14 TABLET | Refills: 0 | Status: SHIPPED | OUTPATIENT
Start: 2023-05-30 | End: 2023-06-06

## 2023-05-30 NOTE — TELEPHONE ENCOUNTER
Right hand:   Reports that pain began around Thursday 5.25.23 sharp pain of whole hand, was dosing tramadol q6h + tylenol without relief.  Pain level today: 8/10 prior to medication 1/10.    tramadol q6h:did not help with pain  Tylenol: 500mg tab x 3, q6h *patient education on only 3,000mg daily  normal bowel movements    Taking wife's Hydrocodone 5/325 mg took 2 this am at 2am pain took pain level down from 8/10 to 1/10    Patient left ED without being seen by ortho.    Denies any fever, nausea, vomiting or red streaking. Patient education on postoperative care.     Will route roxycodone to help with abnormal post-operative pain    Scheduled earlier postop appt with Dr. Hilario for 6.5.23.    Ambrosio Villaseñor MS, OTC   Sports Medicine Assistant   [facility]  (P) [phone]  (F) [phone]

## 2023-06-02 ENCOUNTER — CLINICAL SUPPORT (OUTPATIENT)
Dept: REHABILITATION | Facility: HOSPITAL | Age: 58
End: 2023-06-02
Attending: ORTHOPAEDIC SURGERY
Payer: COMMERCIAL

## 2023-06-02 DIAGNOSIS — R52 PAIN: ICD-10-CM

## 2023-06-02 DIAGNOSIS — G56.03 CARPAL TUNNEL SYNDROME, BILATERAL: ICD-10-CM

## 2023-06-02 DIAGNOSIS — R52: ICD-10-CM

## 2023-06-02 PROCEDURE — 97165 OT EVAL LOW COMPLEX 30 MIN: CPT

## 2023-06-02 PROCEDURE — 97110 THERAPEUTIC EXERCISES: CPT

## 2023-06-02 NOTE — PATIENT INSTRUCTIONS
[facility], OCCUPATIONAL THERAPY  HOME EXERCISE PROGRAM            .    Kaitlin Lemons OTR/L

## 2023-06-05 ENCOUNTER — OFFICE VISIT (OUTPATIENT)
Dept: ORTHOPEDICS | Facility: CLINIC | Age: 58
End: 2023-06-05
Payer: COMMERCIAL

## 2023-06-05 DIAGNOSIS — Z98.890 HISTORY OF CARPAL TUNNEL SURGERY OF RIGHT WRIST: ICD-10-CM

## 2023-06-05 DIAGNOSIS — Z09 FOLLOW-UP EXAMINATION AFTER ORTHOPEDIC SURGERY: Primary | ICD-10-CM

## 2023-06-05 PROCEDURE — 99024 PR POST-OP FOLLOW-UP VISIT: ICD-10-PCS | Mod: S$GLB,,, | Performed by: ORTHOPAEDIC SURGERY

## 2023-06-05 PROCEDURE — 1159F PR MEDICATION LIST DOCUMENTED IN MEDICAL RECORD: ICD-10-PCS | Mod: CPTII,S$GLB,, | Performed by: ORTHOPAEDIC SURGERY

## 2023-06-05 PROCEDURE — 99999 PR PBB SHADOW E&M-EST. PATIENT-LVL III: ICD-10-PCS | Mod: PBBFAC,,, | Performed by: ORTHOPAEDIC SURGERY

## 2023-06-05 PROCEDURE — 99999 PR PBB SHADOW E&M-EST. PATIENT-LVL III: CPT | Mod: PBBFAC,,, | Performed by: ORTHOPAEDIC SURGERY

## 2023-06-05 PROCEDURE — 1159F MED LIST DOCD IN RCRD: CPT | Mod: CPTII,S$GLB,, | Performed by: ORTHOPAEDIC SURGERY

## 2023-06-05 PROCEDURE — 1160F RVW MEDS BY RX/DR IN RCRD: CPT | Mod: CPTII,S$GLB,, | Performed by: ORTHOPAEDIC SURGERY

## 2023-06-05 PROCEDURE — 1160F PR REVIEW ALL MEDS BY PRESCRIBER/CLIN PHARMACIST DOCUMENTED: ICD-10-PCS | Mod: CPTII,S$GLB,, | Performed by: ORTHOPAEDIC SURGERY

## 2023-06-05 PROCEDURE — 99024 POSTOP FOLLOW-UP VISIT: CPT | Mod: S$GLB,,, | Performed by: ORTHOPAEDIC SURGERY

## 2023-06-05 NOTE — PROGRESS NOTES
Jerry Turner presents for post-operative evaluation of   Encounter Diagnoses   Name Primary?    Follow-up examination after orthopedic surgery Yes    History of carpal tunnel surgery of right wrist    The patient is now 13 days s/p 5.23.23 Right carpal tunnel release // left carpal tunnel steroid injection. Patient presented to ED 5.27.23 due to right handed postop pain. He was dosing postop medication appropriately, but continued to have 8/10 pain. He started dosing oxy 5mg prn with Tylenol for breakthrough and pain was better controlled. He took Aleve the other day and the swelling went down in right hand. Sutures were removed by OT on 6.2.23    PE:    AA&O x 4.  NAD  HEENT:  NCAT, sclera nonicteric  Lungs:  Respirations are equal and unlabored.  CV:  2+ bilateral upper and lower extremity pulses.  MSK: The wound is healing well with no signs of erythema or warmth.  There is no drainage. Neurovascularly intact and has 5/5 thenar and intrinsic strength.    A/P: Status post above, doing well  1) Continue with light use of the hand, strengthening at 6 weeks  2) F/U 6 weeks  3) Call with any questions/concerns in the interim        Joyce Hilario MD    Please be aware that this note has been generated with the assistance of MModal voice-to-text.  Please excuse any spelling or grammatical errors.

## 2023-06-07 PROBLEM — R52: Status: RESOLVED | Noted: 2023-06-07 | Resolved: 2023-06-07

## 2023-06-07 PROBLEM — R52: Status: ACTIVE | Noted: 2023-06-07

## 2023-06-07 PROBLEM — R52 PAIN: Status: ACTIVE | Noted: 2023-06-07

## 2023-06-08 NOTE — PLAN OF CARE
[facility]  Occupational Therapy Initial Evaluation    Date: 6/2/2023  Name: Jerry Turner  Clinic Number: [id number]    Therapy Diagnosis:   Encounter Diagnoses   Name Primary?    Carpal tunnel syndrome, bilateral     Pain aggravated by smoking     Pain      Physician: Joyce Hilario MD    Physician Orders: Eval and treat; remove sutures   Medical Diagnosis: G56.03 (ICD-10-CM) - Carpal tunnel syndrome, bilateral  Surgical Procedure and Date: 5/23/2023, Carpal tunnel release    Evaluation Date: 6/2/2023  Insurance Authorization Period Expiration:   Plan of Care Expiration: 8 weeks; 7/28/2023   Date of Return to MD: 6/6/2023  Visit # / Visits authorized: 1 / 1  FOTO: (date and score)    Precautions:  Standard    Time In: 11:00 pm   Time Out: 11:48 pm   Total Appointment Time (timed & untimed codes): 48 minutes      SUBJECTIVE     Date of Onset: gradual onset    History of Current Condition/Mechanism of Injury: Jerry reports: surgery on 5/23. Went to ED on 5/27/2023 due to increased pain. Reports he has been taking oxycodone rx to him      Falls: 0    Involved Side: Right  Dominant Side: Right  Imaging:    Prior Therapy: no  Occupation:     Working presently: employed  Duties:     Functional Limitations/Social History:    Previous functional status includes: Independent with all ADLs.     Current Functional Status   Home/Living environment: lives with their family      Limitation of Functional Status as follows:   ADLs/IADLs:     - Feeding: not necessary    - Bathing: not necessary    - Dressing/Grooming: not necessary    - Driving: not necessary          Pain:  Functional Pain Scale Rating 0-10: Current 8/10, worst 10/10, best 8/10   Location: ulnar aspect of wrist       Patient's Goals for Therapy: less pain and full motion     Medical History:   Past Medical History:   Diagnosis Date    Psoriasis        Surgical History:    has a past surgical history that includes Dental graft;  Spine surgery; Colonoscopy (N/A, 8/19/2022); Carpal tunnel release (Right, 5/23/2023); and Injection of steroid (Left, 5/23/2023).    Medications:   has a current medication list which includes the following prescription(s): meloxicam, ondansetron, taltz autoinjector, tramadol, and triamcinolone acetonide 0.1%, and the following Facility-Administered Medications: triamcinolone acetonide.    Allergies:   Review of patient's allergies indicates:  No Known Allergies       OBJECTIVE     Observation/Appearance:     Edema. Measured in centimeters.   6/7/2023 6/7/2023    Left Right   2in. Above elbow     2in. Below elbow     Wrist Crease 16.8 cm 18.3 cm    Figure of 8     MCPs           Elbow and Wrist ROM. Measured in degrees.   6/7/2023 6/7/2023    Left Right   Elbow Ext/Flex     Supination/Pronation WNL/55 60/50   Wrist Ext/Flex 70/56 50/40   Wrist RD/UD  Unable due to pain    *verbalized severe pain along ulnar aspect of wrist     Hand ROM. Measured in degrees.   6/7/2023 6/7/2023    Left Right        Index: MP                 PIP                    DIP  2.5 cm              [place]          Long:  MP                PIP                DIP  2.5 cm              [place]          Ring:   MP                PIP                DIP  2.5 cm              [place]          Small:  MP                 PIP                 DIP  2.5 cm              [place]          Thumb: MP                  IP         Rad ADD/ABD         Pal ADD/ABD            Opposition     *thumb extension pain along ulnar dorsal        Strength (Dynamometer) and Pinch Strength (Pinch Gauge)  Measured in pounds.   6/7/2023 6/7/2023    Left Right   Rung II  Defered    Key Pinch     3pt Pinch     2pt Pinch       Sensation: denied numbness and tingling    6/2/2023 6/2/2023    Left Right   [place]     Normal 1.65-2.83     Diminished Light Touch 3.22-3.61     Diminished Protective 3.84-4.31     Loss of Protective 4.56-6.65     Untestable >6.65     2 Point Discrimination      Static     Dynamic       Manual Muscle Test   6/7/2023 6/7/2023    Left Right   Wrist Extension      Wrist Flexion     Radial Deviation     Ulnar Deviation     Supination     Pronation     Elbow Extension     Elbow Flexion             Limitation/Restriction for FOTO initial eval; wrist Survey    Therapist reviewed FOTO scores for Jerry Turner on 6/2/2023.   FOTO documents entered into [facility] - see Media section.    Limitation Score: needs to be taken%         Treatment   Total Treatment time (time-based codes) separate from Evaluation: 21 minutes    Jerry received the treatments listed below:     Supervised modalities after being cleared for contradictions: Hot Pack - 6 minutes         Manual therapy techniques: suture removal were applied to the: R hand for 5 minutes, including:  Suture removal     Therapeutic exercises to develop ROM for 10 minutes, including:  AROM Wrist  Ext/flx  RD/UD   X 10 reps each    Wave, hook, straight fist, composite fist, finger spreads, finger lifts, pinky slides   X 10 reps each              Patient Education and Home Exercises      Education provided:   - no soaking 5 days after suture removal   - monitor pain, reach out to Dr. Hilario if it intensifies.  - pt given HEP     Written Home Exercises Provided: yes.  Exercises were reviewed and Jerry was able to demonstrate them prior to the end of the session.  Jerry demonstrated good  understanding of the education provided. See EMR under Patient Instructions for exercises provided during therapy sessions.     Pt was advised to perform these exercises free of pain, and to stop performing them if pain occurs.    Patient/Family Education: role of OT, goals for OT, scheduling/cancellations - pt verbalized understanding. Discussed insurance limitations with patient.    ASSESSMENT     Jerry Turner is a 57 y.o. male referred to outpatient occupational therapy and presents with a medical diagnosis of s/p R carpal tunnel release.  Patient presents with  the following therapy deficits: Decreased ROM, Decreased  strength, Decreased pinch strength, Decreased functional hand use, Increased pain, and Edema and demonstrates limitations as described in the chart below. Following medical record review it is determined that pt will benefit from occupational therapy services in order to maximize pain free and/or functional use of right wrist. The following goals were discussed with the patient and patient is in agreement with them as to be addressed in the treatment plan. The patient's rehab potential is Good.     Anticipated barriers to occupational therapy: n/a  Pt has no cultural, educational or language barriers to learning provided.    Profile and History Assessment of Occupational Performance Level of Clinical Decision Making Complexity Score   Occupational Profile:   Jerry Turner is a 57 y.o. male who lives with their family and is currently employed Jerry Turner has difficulty with  ADLs and IADLs as listed previously, which  Affecting hisdaily functional abilities.      Comorbidities:    has a past medical history of Psoriasis.    Medical and Therapy History Review:   Brief               Performance Deficits    Physical:  Muscle Power/Strength  Muscle Endurance  Skin Integrity/Scar Formation  Edema   Strength  Pinch Strength  Pain    Cognitive:  No Deficits    Psychosocial:    Habits  Routines  Rituals     Clinical Decision Making:  low    Assessment Process:  Detailed Assessments    Modification/Need for Assistance:  Not Necessary    Intervention Selection:  Several Treatment Options       low  Based on PMHX, co morbidities , data from assessments and functional level of assistance required with task and clinical presentation directly impacting function.         Goals:   The following goals were discussed with the patient and patient is in agreement with them as to be addressed in the treatment plan.   Long term goals: (by d/c)  1)  Patient to be IND with HEP and  modalities for pain management  2)  Increase ROM 5-10 degrees to increase functional hand use for ADLs/leisure activities  3)   Increase  strength 5-8 lbs. to carry laundry, carry groceries, sweep, and increase functional independence of right hand for ADLs/iADLs  4)   Increase pinch 3-4  psis for  Increase in functional independence in ADLs/iADLs such as manipulating fasteners and opening containers  5) Patient to score at %  or less on FOTO to demonstrate improved perception of functional rightUE Use.            Short term Goals: ( 4 weeks)   1)  Patient to be IND with HEP and modalities for pain management  2)  Increase ROM 3-5 degrees to increase functional hand use for ADLs/leisure activities  3)   Assess  strength when appropriate   4)   Assess pinch strength when appropriate   5)   Patient to score at %  or less on FOTO to demonstrate improved perception of functional right UE Use.        PLAN   Plan of Care Certification: 6/2/2023 to 7/28/2023.     Outpatient Occupational Therapy 2 times weekly for 8 weeks to include the following interventions: Paraffin, Fluidotherapy, Manual therapy/joint mobilizations, Modalities for pain management, US 3 mhz, Therapeutic exercises/activities., Strengthening, Orthotic Fabrication/Fit/Training, Edema Control, Scar Management, and Joint Protection.      Kaitlin Lemons OT      I CERTIFY THE NEED FOR THESE SERVICES FURNISHED UNDER THIS PLAN OF TREATMENT AND WHILE UNDER MY CARE  Physician's comments:      Physician's Signature: ___________________________________________________

## 2023-06-09 ENCOUNTER — CLINICAL SUPPORT (OUTPATIENT)
Dept: REHABILITATION | Facility: HOSPITAL | Age: 58
End: 2023-06-09
Payer: COMMERCIAL

## 2023-06-09 DIAGNOSIS — R29.898 DECREASED GRIP STRENGTH OF RIGHT HAND: ICD-10-CM

## 2023-06-09 DIAGNOSIS — M25.60 DECREASED RANGE OF MOTION: ICD-10-CM

## 2023-06-09 PROCEDURE — 97022 WHIRLPOOL THERAPY: CPT

## 2023-06-09 PROCEDURE — 97140 MANUAL THERAPY 1/> REGIONS: CPT

## 2023-06-09 PROCEDURE — 97110 THERAPEUTIC EXERCISES: CPT

## 2023-06-09 NOTE — PROGRESS NOTES
"[facility] OUTPATIENT THERAPY AND WELLNESS  Occupational Therapy Treatment Note     Date: 6/9/2023  Name: Jerry Turner  Clinic Number: [id number]    Therapy Diagnosis:   Encounter Diagnoses   Name Primary?    Decreased range of motion     Decreased  strength of right hand      Physician: Joyce Hilario MD    Physician Orders: Eval and treat; remove sutures   Medical Diagnosis: G56.03 (ICD-10-CM) - Carpal tunnel syndrome, bilateral  Surgical Procedure and Date: 5/23/2023, Carpal tunnel release    Evaluation Date: 6/2/2023  Insurance Authorization Period Expiration:   Plan of Care Expiration: 8 weeks; 7/28/2023   Date of Return to MD: 6/6/2023  Visit # / Visits authorized: 1 / 1  FOTO: (date and score)     Precautions:  Standard         Time In: 09:02 AM   Time Out: 09:41 AM  Total Billable Time: 39 minutes    Subjective     Pt reports: "feels better. I am able to hold stuff now"   He was compliant with home exercise program given last session.   Response to previous treatment: first tx  Functional change: light activity without difficulty       Pain: 1/10  Location: right hands      Objective     Objective Measures updated at progress report unless specified.  Edema. Measured in centimeters.    6/7/2023 6/7/2023     Left Right   2in. Above elbow       2in. Below elbow       Wrist Crease 16.8 cm 18.3 cm    Figure of 8       MCPs                Elbow and Wrist ROM. Measured in degrees.    6/7/2023 6/7/2023     Left Right   Elbow Ext/Flex       Supination/Pronation WNL/55 60/50   Wrist Ext/Flex 70/56 50/40   Wrist RD/UD   Unable due to pain    *verbalized severe pain along ulnar aspect of wrist      Hand ROM. Measured in degrees.    6/7/2023 6/7/2023     Left Right           Index: MP                   PIP                      DIP   2.5 cm              [place]               Long:  MP                  PIP                  DIP   2.5 cm              [place]               Ring:   MP                  PIP                  DIP   2.5 cm "              [place]               Small:  MP                   PIP                   DIP   2.5 cm              [place]               Thumb: MP                    IP           Rad ADD/ABD           Pal ADD/ABD              Opposition       *thumb extension pain along ulnar dorsal          Strength (Dynamometer) and Pinch Strength (Pinch Gauge)  Measured in pounds.    6/7/2023 6/7/2023     Left Right   Rung II   Defered    Key Pinch       3pt Pinch       2pt Pinch            Sensation: denied numbness and tingling     6/2/2023 6/2/2023     Left Right   [place] Neeta       Normal 1.65-2.83       Diminished Light Touch 3.22-3.61       Diminished Protective 3.84-4.31       Loss of Protective 4.56-6.65       Untestable >6.65       2 Point Discrimination       Static       Dynamic          Manual Muscle Test    6/7/2023 6/7/2023     Left Right   Wrist Extension        Wrist Flexion       Radial Deviation       Ulnar Deviation       Supination       Pronation       Elbow Extension       Elbow Flexion                   Limitation/Restriction for FOTO initial eval; wrist Survey     Therapist reviewed FOTO scores for Jerry Turner on 6/2/2023.   FOTO documents entered into [employer] - see Media section.     Limitation Score: needs to be taken%        Treatment     Jerry received the treatments listed below:          Therapeutic exercises to develop ROM for 21 minutes including:  - digit abduction/adduction (1 min)   - digit extension (1 min)   - isospheres (1 min) CW/CCW  - in hand manipulation with small poms (2 sets)   - prayer stretch holding for 15s x 3 reps   - table top wrist extension   - thumb ROM (palmar/radial) x 15 reps each   - opposition to each digit x 10 reps   - wrist maze (1 min)       manual therapy techniques: Manual Lymphatic Drainage and Soft tissue Mobilization were applied to the: R hand for 8 minutes, including:  - retrograde massage   - STM palmar tissue   - vibration tool       supervised modalities after being  cleared for contradictions: Fluidotherapy: To R hand for 10 min, continuous air, 115 deg, air speed 50 to decrease pain, edema & scar tissue, sensory re- education, and increased tissue extensibility prior to therex      Patient Education and Home Exercises     Education provided:   - HEP frequency   - Progress towards goals     Written Home Exercises Provided: yes.  Exercises were reviewed and Jerry was able to demonstrate them prior to the end of the session.  Brendeng demonstrated good  understanding of the HEP provided. See EMR under Patient Instructions for exercises provided during therapy sessions.       Assessment     Pt would continue to benefit from skilled OT. Good lola to tx. Intrinsic and extrinsic tightness noted. Added prayer stretch to HEP      Jerry is progressing well towards his goals and there are no updates to goals at this time. Pt prognosis is Good.     Pt will continue to benefit from skilled outpatient occupational therapy to address the deficits listed in the problem list on initial evaluation provide pt/family education and to maximize pt's level of independence in the home and community environment.     Pt's spiritual, cultural and educational needs considered and pt agreeable to plan of care and goals.    Anticipated barriers to occupational therapy:     Goals:    Long term goals: (by d/c)  1)  Patient to be IND with HEP and modalities for pain management  2)  Increase ROM 5-10 degrees to increase functional hand use for ADLs/leisure activities  3)   Increase  strength 5-8 lbs. to carry laundry, carry groceries, sweep, and increase functional independence of right hand for ADLs/iADLs  4)   Increase pinch 3-4  psis for  Increase in functional independence in ADLs/iADLs such as manipulating fasteners and opening containers  5) Patient to score at %  or less on FOTO to demonstrate improved perception of functional rightUE Use.           Short term Goals: ( 4 weeks)   1)  Patient to be IND  with HEP and modalities for pain management  2)  Increase ROM 3-5 degrees to increase functional hand use for ADLs/leisure activities  3)   Assess  strength when appropriate   4)   Assess pinch strength when appropriate   5)   Patient to score at %  or less on FOTO to demonstrate improved perception of functional right UE Use  Plan     Updates/Grading for next session: cont as lola Lemons, OT

## 2023-06-15 ENCOUNTER — CLINICAL SUPPORT (OUTPATIENT)
Dept: REHABILITATION | Facility: HOSPITAL | Age: 58
End: 2023-06-15
Payer: COMMERCIAL

## 2023-06-15 DIAGNOSIS — R29.898 DECREASED GRIP STRENGTH OF RIGHT HAND: ICD-10-CM

## 2023-06-15 DIAGNOSIS — M25.60 DECREASED RANGE OF MOTION: Primary | ICD-10-CM

## 2023-06-15 PROCEDURE — 97140 MANUAL THERAPY 1/> REGIONS: CPT

## 2023-06-15 PROCEDURE — 97022 WHIRLPOOL THERAPY: CPT

## 2023-06-15 PROCEDURE — 97110 THERAPEUTIC EXERCISES: CPT

## 2023-06-15 NOTE — PROGRESS NOTES
[facility]  Occupational Therapy Treatment Note     Date: 6/15/2023  Name: Jerry Turner  Clinic Number: [id number]    Therapy Diagnosis:   Encounter Diagnoses   Name Primary?    Decreased range of motion Yes    Decreased  strength of right hand        Physician: Joyce Hilario MD    Physician Orders: Eval and treat; remove sutures   Medical Diagnosis: G56.03 (ICD-10-CM) - Carpal tunnel syndrome, bilateral  Surgical Procedure and Date: 5/23/2023, Carpal tunnel release    Evaluation Date: 6/2/2023  Insurance Authorization Period Expiration:   Plan of Care Expiration: 8 weeks; 7/28/2023   Date of Return to MD: 6/6/2023  Visit # / Visits authorized: 1 / 1  FOTO: (date and score)     Precautions:  Standard         Time In:  08:00 AM   Time Out: 08:51 AM  Total Billable Time: 51  minutes    Subjective     Pt reports: his ulnar sided pain has gone down but he feels a clicking in his elbow and wrist when he extends his elbow   He was compliant with home exercise program given last session.   Response to previous treatment: first tx  Functional change: light activity without difficulty       Pain: 0/10  Location: right hands      Objective     Objective Measures updated at progress report unless specified.  Edema. Measured in centimeters.    6/7/2023 6/7/2023     Left Right   2in. Above elbow       2in. Below elbow       Wrist Crease 16.8 cm 18.3 cm    Figure of 8       MCPs                Elbow and Wrist ROM. Measured in degrees.    6/7/2023 6/7/2023     Left Right   Elbow Ext/Flex       Supination/Pronation WNL/55 60/50   Wrist Ext/Flex 70/56 50/40   Wrist RD/UD   Unable due to pain    *verbalized severe pain along ulnar aspect of wrist      Hand ROM. Measured in degrees.    6/7/2023 6/7/2023     Left Right           Index: MP                   PIP                      DIP   2.5 cm              [place]               Long:  MP                  PIP                  DIP   2.5 cm              [place]                Ring:   MP                  PIP                  DIP   2.5 cm              [place]               Small:  MP                   PIP                   DIP   2.5 cm              [place]               Thumb: MP                    IP           Rad ADD/ABD           Pal ADD/ABD              Opposition       *thumb extension pain along ulnar dorsal          Strength (Dynamometer) and Pinch Strength (Pinch Gauge)  Measured in pounds.    6/7/2023 6/7/2023     Left Right   Rung II   Defered    Key Pinch       3pt Pinch       2pt Pinch            Sensation: denied numbness and tingling     6/2/2023 6/2/2023     Left Right   [place]eta       Normal 1.65-2.83       Diminished Light Touch 3.22-3.61       Diminished Protective 3.84-4.31       Loss of Protective 4.56-6.65       Untestable >6.65       2 Point Discrimination       Static       Dynamic          Manual Muscle Test    6/7/2023 6/7/2023     Left Right   Wrist Extension        Wrist Flexion       Radial Deviation       Ulnar Deviation       Supination       Pronation       Elbow Extension       Elbow Flexion                   Limitation/Restriction for FOTO initial eval; wrist Survey     Therapist reviewed FOTO scores for Jerry Turner on 6/2/2023.   FOTO documents entered into [employer] - see Media section.     Limitation Score: needs to be taken%        Treatment     Jerry received the treatments listed below:            Therapeutic exercises to develop ROM for 33 minutes including:  - digit abduction/adduction (1 min)   - digit extension (1 min)   - isospheres (1 min) CW/CCW  - small dowel intrinsic minus x 10 reps (3 sets)   - in hand manipulation with marbles (2 sets)   - prayer stretch holding for 15s x 4 reps   - table top wrist extension x 10 reps (2 sets)   - towel walks (3 min)   - thumb ROM (palmar/radial) x 15 reps each   - opposition to each digit x 10 reps NT   - wrist maze (1 min) NT   - median nerve glides x 10 reps   - median/ulnar nerve glides x 10 reps    - [place] nerve glides x 10 reps       manual therapy techniques: Manual Lymphatic Drainage and Soft tissue Mobilization were applied to the: R hand for 8 minutes, including:  - retrograde massage   - STM palmar tissue   - vibration tool      supervised modalities after being cleared for contradictions: Fluidotherapy: To R hand for 10 min, continuous air, 115 deg, air speed 50 to decrease pain, edema & scar tissue, sensory re- education, and increased tissue extensibility prior to therex      Patient Education and Home Exercises     Education provided:   - HEP frequency   - Progress towards goals     Written Home Exercises Provided: yes.  Exercises were reviewed and Jerry was able to demonstrate them prior to the end of the session.  Jerry demonstrated good  understanding of the HEP provided. See EMR under Patient Instructions for exercises provided during therapy sessions.       Assessment       Pt would continue to benefit from skilled OT. Positive guyon tinel's and cubital tunnel today. Added ulnar nerve glides to POC and HEP to address this.       Jerry is progressing well towards his goals and there are no updates to goals at this time. Pt prognosis is Good.     Pt will continue to benefit from skilled outpatient occupational therapy to address the deficits listed in the problem list on initial evaluation provide pt/family education and to maximize pt's level of independence in the home and community environment.     Pt's spiritual, cultural and educational needs considered and pt agreeable to plan of care and goals.    Anticipated barriers to occupational therapy:     Goals:    Long term goals: (by d/c)  1)  Patient to be IND with HEP and modalities for pain management  2)  Increase ROM 5-10 degrees to increase functional hand use for ADLs/leisure activities  3)   Increase  strength 5-8 lbs. to carry laundry, carry groceries, sweep, and increase functional independence of right hand for ADLs/iADLs  4)    Increase pinch 3-4  psis for  Increase in functional independence in ADLs/iADLs such as manipulating fasteners and opening containers  5) Patient to score at %  or less on FOTO to demonstrate improved perception of functional rightUE Use.           Short term Goals: ( 4 weeks)   1)  Patient to be IND with HEP and modalities for pain management  2)  Increase ROM 3-5 degrees to increase functional hand use for ADLs/leisure activities  3)   Assess  strength when appropriate   4)   Assess pinch strength when appropriate   5)   Patient to score at %  or less on FOTO to demonstrate improved perception of functional right UE Use  Plan     Updates/Grading for next session: cont as lola Lemons, OT

## 2023-06-15 NOTE — PATIENT INSTRUCTIONS
MEDIAN NERVE GLIDING    Complete 10 repetitions of each exercise 4-6 times a day.      Make a fist, keep the wrist straight.  Straighten the fingers, keep the wrist straight.  Gently bend the wrist back, keep the thumb close to the fingers.  Extend the thumb out.  Turn forearm so palm is facing up.  Gently stretch the thumb out using the other hand.    If any of these exercises aggravate your hands, stop, rest and try returning again to the previous exercise performed without pain. Proceed at your own pace using pain tolerance as your guide.

## 2023-06-19 ENCOUNTER — CLINICAL SUPPORT (OUTPATIENT)
Dept: REHABILITATION | Facility: HOSPITAL | Age: 58
End: 2023-06-19
Payer: COMMERCIAL

## 2023-06-19 PROCEDURE — 97140 MANUAL THERAPY 1/> REGIONS: CPT

## 2023-06-19 PROCEDURE — 97022 WHIRLPOOL THERAPY: CPT

## 2023-06-19 PROCEDURE — 97110 THERAPEUTIC EXERCISES: CPT

## 2023-06-19 NOTE — PROGRESS NOTES
[facility]  Occupational Therapy Treatment Note     Date: 6/19/2023  Name: Jerry Turner  Clinic Number: [id number]    Therapy Diagnosis:   No diagnosis found.      Physician: Joyce Hilario MD    Physician Orders: Eval and treat; remove sutures   Medical Diagnosis: G56.03 (ICD-10-CM) - Carpal tunnel syndrome, bilateral  Surgical Procedure and Date: 5/23/2023, Carpal tunnel release    Evaluation Date: 6/2/2023  Insurance Authorization Period Expiration:   Plan of Care Expiration: 8 weeks; 7/28/2023   Date of Return to MD: 6/6/2023  Visit # / Visits authorized: 1 / 1  FOTO: TBA     Precautions:  Standard         Time In:  09:00 AM   Time Out: 09: 48 AM  Total Billable Time: 48 minutes    Subjective     Pt reports continued varying degrees of tingling in the median nerve distribution.   He was compliant with home exercise program given last session.   Response to previous treatment: first tx  Functional change: light activity without difficulty       Pain: 0/10  Location: right hands      Objective     Objective Measures updated at progress report unless specified.  Edema. Measured in centimeters.    6/7/2023 6/7/2023     Left Right   2in. Above elbow       2in. Below elbow       Wrist Crease 16.8 cm 18.3 cm    Figure of 8       MCPs                Elbow and Wrist ROM. Measured in degrees.    6/7/2023 6/7/2023     Left Right   Elbow Ext/Flex       Supination/Pronation WNL/55 60/50   Wrist Ext/Flex 70/56 50/40   Wrist RD/UD   Unable due to pain    *verbalized severe pain along ulnar aspect of wrist      Hand ROM. Measured in degrees.    6/7/2023 6/7/2023     Left Right           Index: MP                   PIP                      DIP   2.5 cm              K[place]AND               Long:  MP                  PIP                  DIP   2.5 cm              [place]               Ring:   MP                  PIP                  DIP   2.5 cm              [place]               Small:  MP                   PIP                    DIP   2.5 cm              [place]               Thumb: MP                    IP           Rad ADD/ABD           Pal ADD/ABD              Opposition       *thumb extension pain along ulnar dorsal          Strength (Dynamometer) and Pinch Strength (Pinch Gauge)  Measured in pounds.    6/7/2023 6/7/2023     Left Right   Rung II   Defered    Key Pinch       3pt Pinch       2pt Pinch            Sensation: denied numbness and tingling     6/2/2023 6/2/2023     Left Right   Theodore Neeta       Normal 1.65-2.83       Diminished Light Touch 3.22-3.61       Diminished Protective 3.84-4.31       Loss of Protective 4.56-6.65       Untestable >6.65       2 Point Discrimination       Static       Dynamic          Manual Muscle Test    6/7/2023 6/7/2023     Left Right   Wrist Extension        Wrist Flexion       Radial Deviation       Ulnar Deviation       Supination       Pronation       Elbow Extension       Elbow Flexion                   Limitation/Restriction for FOTO initial eval; wrist Survey     Therapist reviewed FOTO scores for Jerry Turner on 6/2/2023.   FOTO documents entered into [employer] - see Media section.     Limitation Score: needs to be taken%        Treatment     Jerry received the treatments listed below:      supervised modalities after being cleared for contradictions: Fluidotherapy: To R hand for 10 min, continuous air, 115 deg, air speed 50 to decrease pain, edema & scar tissue, sensory re- education, and increased tissue extensibility prior to therex    manual therapy techniques were applied to the: R hand for 10 minutes, including:  - STM palmar tissue   - PROM: wrist ,and digital ext, Hook, Fist, composite thumb flexion/opposition      Therapeutic exercises to develop ROM for 28 minutes including:  -- thumb AROM palmar/radial, opposition to each digit x 10 reps   -  wrist pre's with 2 lbs, 3 ways, 30 reps ea way (Pt performed 70 for ext)  - red therabar  fa sup, fa pro, wrist ext, wrist flex,  wrist rd, wrist ud 20  --peach putty twirling x 3 min, squeezing x 20 reps, log pinching 5 logs ea for 3-jaw and key pinches  - median nerve glides x 10 reps  - median/ulnar nerve glides x 10 reps   (NT)  - guyon canal nerve glides x 10 reps  (NT)            Patient Education and Home Exercises     Education provided:   - HEP frequency   - Progress towards goals     Written Home Exercises Provided: yes.  Exercises were reviewed and Jerry was able to demonstrate them prior to the end of the session.  Jerry demonstrated good  understanding of the HEP provided. See EMR under Patient Instructions for exercises provided during therapy sessions.       Assessment     Jerry tolerated progression to resistive exercises well with c/o of pain. Reported fatigue after. No report of paresthesias in UN distribution or ulnar-sided wrist pain today. Continue to monitor as pt has recently displayed a   positive guyon tinel's and cubital tunnel today. Pt would continue to benefit from skilled OT.   Jerry is progressing well towards his goals and there are no updates to goals at this time. Pt prognosis is Good.     Pt will continue to benefit from skilled outpatient occupational therapy to address the deficits listed in the problem list on initial evaluation provide pt/family education and to maximize pt's level of independence in the home and community environment.     Pt's spiritual, cultural and educational needs considered and pt agreeable to plan of care and goals.    Anticipated barriers to occupational therapy:     Goals:    Long term goals: (by d/c)  1)  Patient to be IND with HEP and modalities for pain management  2)  Increase ROM 5-10 degrees to increase functional hand use for ADLs/leisure activities  3)   Increase  strength 5-8 lbs. to carry laundry, carry groceries, sweep, and increase functional independence of right hand for ADLs/iADLs  4)   Increase pinch 3-4  psis for  Increase in functional independence in  ADLs/iADLs such as manipulating fasteners and opening containers  5) Patient to score at %  or less on FOTO to demonstrate improved perception of functional rightUE Use.           Short term Goals: ( 4 weeks)   1)  Patient to be IND with HEP and modalities for pain management  2)  Increase ROM 3-5 degrees to increase functional hand use for ADLs/leisure activities  3)   Assess  strength when appropriate   4)   Assess pinch strength when appropriate   5)   Patient to score at %  or less on FOTO to demonstrate improved perception of functional right UE Use  Plan     Updates/Grading for next session: cont as lola  Reassess rom, assess  and pinch strengths, issue FOTO  Dependent upon response to today's session, issue putty HEP and Gyon Canal and Cubital Tunnel Syngrome symptomology.  Lakisha Dutta, OT

## 2023-06-21 ENCOUNTER — CLINICAL SUPPORT (OUTPATIENT)
Dept: REHABILITATION | Facility: HOSPITAL | Age: 58
End: 2023-06-21
Payer: COMMERCIAL

## 2023-06-21 DIAGNOSIS — M25.60 DECREASED RANGE OF MOTION: Primary | ICD-10-CM

## 2023-06-21 DIAGNOSIS — R29.898 DECREASED GRIP STRENGTH OF RIGHT HAND: ICD-10-CM

## 2023-06-21 PROCEDURE — 97140 MANUAL THERAPY 1/> REGIONS: CPT

## 2023-06-21 PROCEDURE — 97022 WHIRLPOOL THERAPY: CPT

## 2023-06-21 PROCEDURE — 97110 THERAPEUTIC EXERCISES: CPT

## 2023-06-21 PROCEDURE — 97530 THERAPEUTIC ACTIVITIES: CPT

## 2023-06-21 NOTE — PATIENT INSTRUCTIONS
[facility]  OCCUPATIONAL THERAPY  HOME EXERCISE PROGRAM     Complete the following strengthening program 1x/day.            3 minutes           3 seconds x 20 reps             5 logs      _       3 logs               Lakisha TIMMONS CHT  Certified Hand Therapist  Occupational Therapist

## 2023-06-21 NOTE — PROGRESS NOTES
[facility]  Occupational Therapy Treatment Note     Date: 6/21/2023  Name: Jerry Turner  Clinic Number: [id number]    Therapy Diagnosis:   Encounter Diagnoses   Name Primary?    Decreased range of motion Yes    Decreased  strength of right hand          Physician: Joyce Hilario MD    Physician Orders: Eval and treat; remove sutures   Medical Diagnosis: G56.03 (ICD-10-CM) - Carpal tunnel syndrome, bilateral  Surgical Procedure and Date: 5/23/2023, Carpal tunnel release    Evaluation Date: 6/2/2023  Insurance Authorization Period Expiration:   Plan of Care Expiration: 8 weeks; 7/28/2023   Date of Return to MD: 6/6/2023  Visit # / Visits authorized: 1 / 1  FOTO: TBA     Precautions:  Standard         Time In:  09:00 AM   Time Out: 10:10 AM  Total Billable Time: 65 minutes    Subjective     Pt reports continued mild tingling in the median nerve distribution after hand use but that is better than prior to CTR.  Denies any ulnar nerve symptoms. After the end of session today, Jerry reports that his hand felt better.   He was compliant with home exercise program given last session.   Response to previous treatment: first tx  Functional change: light activity without difficulty       Pain: 0/10  Location: right hands      Objective     Objective Measures updated at progress report unless specified.  Edema. Measured in centimeters.    6/7/2023 6/7/2023     Left Right   2in. Above elbow       2in. Below elbow       Wrist Crease 16.8 cm 18.3 cm    Figure of 8       MCPs                Elbow and Wrist ROM. Measured in degrees.    6/7/2023 6/7/2023 6/21/2023     Left Right Right   Elbow Ext/Flex        Supination/Pronation WNL/55 60/50    Wrist Ext/Flex 70/56 50/40 58/62   Wrist RD/UD   Unable due to pain  21/25   *verbalized severe pain along ulnar aspect of wrist      Hand ROM. Measured in degrees.    6/7/2023 6/7/2023 6/21/2023     Left Right Right            Index: MP      Finger arom WNL               PIP                       DIP   2.5 cm               [place]                 Long:  MP                   PIP                   DIP   2.5 cm               [place]                 Ring:   MP                   PIP                   DIP   2.5 cm               [place]                 Small:  MP                    PIP                    DIP   2.5 cm               [place]                 Thumb: MP                     IP            Rad ADD/ABD            Pal ADD/ABD               Opposition     Tip of RF   *thumb extension pain along ulnar dorsal          Strength (Dynamometer) and Pinch Strength (Pinch Gauge)  Measured in pounds.    6/21/2023 6/21/2023     Left Right   Rung II  55 32   Rosales Pinch 20  16.5    3pt Pinch  16.5 14    2pt Pinch            Sensation: denied numbness and tingling     6/2/2023 6/2/2023     Left Right   [place] Neeta       Normal 1.65-2.83       Diminished Light Touch 3.22-3.61       Diminished Protective 3.84-4.31       Loss of Protective 4.56-6.65       Untestable >6.65       2 Point Discrimination       Static       Dynamic                     Limitation/Restriction for FOTO initial eval; wrist Survey     Therapist reviewed FOTO scores for Jerry Turner on 6/2/2023.   FOTO documents entered into [employer] - see Media section.     Limitation Score: needs to be taken%        Treatment     Jerry received the treatments listed below:      supervised modalities after being cleared for contradictions: Fluidotherapy: To R hand for 10 min, continuous air, 115 deg, air speed 50 to decrease pain, edema & scar tissue, sensory re- education, and increased tissue extensibility prior to therex    manual therapy techniques were applied to the: R hand for 10 minutes, including:  - PROM: wrist ,and digital ext, Hook, Fist, composite thumb flexion/opposition  -STM: DFM (NT)    Therapeutic exercises to develop ROM for 30  minutes including:  Reassessment of arom and assessment of  and pinch strengths  First FOTO  assessment  -- thumb AROM palmar/radial, opposition to each digit x 10 reps (NT)  -  wrist pre's with 3 lbs, 3 ways, 3/10 reps ea way   --orange putty twirling x 3 min, squeezing x 20 reps, log pinching 5 logs ea for 3-jaw and key pinches  - educated in putty HEP and issued orange putty for home use  - median nerve glides x 10 reps (NT)      Therapeutic Activities- to promote increase in functional use of the R hand 15 min   liu-balane to improve ability to maintain a sustained  x 3 min  Hammer swings 20 to improve ability to maintain a sustained  x 20 reps  - red therabar  fa sup, fa pro, wrist ext, wrist flex, wrist rd, wrist ud 20 to improve ability to twist against resistance   -      Patient Education and Home Exercises     Education provided:   - HEP frequency   - Progress towards goals     Written Home Exercises Provided: yes.  Exercises were reviewed and Jerry was able to demonstrate them prior to the end of the session.  Jerry demonstrated good  understanding of the HEP provided. See EMR under Patient Instructions for exercises provided during therapy sessions.       Assessment     Upon formal reassessment shows very good increases in wrist rom. Baseline assessment of  and pinch strengths reveals weakness as expected at this time. He tolerated treatment well and at the end of the session, he reported that his hand felt better with more mobility.  Jerry is progressing well towards his goals and there are no updates to goals at this time. Pt prognosis is Good.     Pt will continue to benefit from skilled outpatient occupational therapy to address the deficits listed in the problem list on initial evaluation provide pt/family education and to maximize pt's level of independence in the home and community environment.     Pt's spiritual, cultural and educational needs considered and pt agreeable to plan of care and goals.    Anticipated barriers to occupational therapy:     Goals:    Long term goals:  (by d/c)  1)  Patient to be IND with HEP and modalities for pain management  2)  Increase ROM 5-10 degrees to increase functional hand use for ADLs/leisure activities  3)   Increase  strength 5-8 lbs. to carry laundry, carry groceries, sweep, and increase functional independence of right hand for ADLs/iADLs  4)   Increase pinch 3-4  psis for  Increase in functional independence in ADLs/iADLs such as manipulating fasteners and opening containers  5) Patient to score at %  or less on FOTO to demonstrate improved perception of functional rightUE Use.           Short term Goals: ( 4 weeks)   1)  Patient to be IND with HEP and modalities for pain management  2)  Increase ROM 3-5 degrees to increase functional hand use for ADLs/leisure activities  3)   Assess  strength when appropriate   4)   Assess pinch strength when appropriate   5)   Patient to score at %  or less on FOTO to demonstrate improved perception of functional right UE Use  Plan     Updates/Grading for next session: cont as lola  Reassess rom, assess  and pinch strengths, issue FOTO  Dependent upon response to today's session, issue putty HEP and Gyon Canal and Cubital Tunnel Syngrome symptomology.  Lakisha Dutta, OT

## 2023-06-26 ENCOUNTER — CLINICAL SUPPORT (OUTPATIENT)
Dept: REHABILITATION | Facility: HOSPITAL | Age: 58
End: 2023-06-26
Payer: COMMERCIAL

## 2023-06-26 DIAGNOSIS — M25.60 DECREASED RANGE OF MOTION: Primary | ICD-10-CM

## 2023-06-26 DIAGNOSIS — R29.898 DECREASED GRIP STRENGTH OF RIGHT HAND: ICD-10-CM

## 2023-06-26 PROCEDURE — 97022 WHIRLPOOL THERAPY: CPT

## 2023-06-26 PROCEDURE — 97530 THERAPEUTIC ACTIVITIES: CPT

## 2023-06-26 PROCEDURE — 97110 THERAPEUTIC EXERCISES: CPT

## 2023-06-26 PROCEDURE — 97140 MANUAL THERAPY 1/> REGIONS: CPT

## 2023-06-26 NOTE — PROGRESS NOTES
[facility]  Occupational Therapy Treatment Note     Date: 6/26/2023  Name: Jerry Turner  Clinic Number: [id number]    Therapy Diagnosis:   Encounter Diagnoses   Name Primary?    Decreased range of motion Yes    Decreased  strength of right hand            Physician: Joyce Hilario MD    Physician Orders: Eval and treat; remove sutures   Medical Diagnosis: G56.03 (ICD-10-CM) - Carpal tunnel syndrome, bilateral  Surgical Procedure and Date: 5/23/2023, Carpal tunnel release    Evaluation Date: 6/2/2023  Insurance Authorization Period Expiration:   Plan of Care Expiration: 8 weeks; 7/28/2023   Date of Return to MD: 6/6/2023  Visit # / Visits authorized: /20  FOTO: 41% limitation on 6/21/2023    Time In:  09:00 AM   Time Out: 10:00 AM  Total Billable Time: 55 minutes    Subjective     Pt reports continued very mild tingling in the median nerve distribution after hand use but that is better than prior to CTR.  Denies any ulnar nerve symptoms. C/Cis pain in the finger IP's.  He was compliant with home exercise program given last session.   Response to previous treatment: first tx  Functional change: light activity without difficulty       Pain: 0/10  Location: right hand      Objective     Objective Measures updated at progress report unless specified.  Edema. Measured in centimeters.    6/7/2023 6/7/2023     Left Right   2in. Above elbow       2in. Below elbow       Wrist Crease 16.8 cm 18.3 cm    Figure of 8       MCPs                Elbow and Wrist ROM. Measured in degrees.    6/7/2023 6/7/2023 6/21/2023     Left Right Right   Elbow Ext/Flex        Supination/Pronation WNL/55 60/50 WNL   Wrist Ext/Flex 70/56 50/40 58/62   Wrist RD/UD   Unable due to pain  21/25   *verbalized severe pain along ulnar aspect of wrist      Hand ROM. Measured in degrees.    6/7/2023 6/7/2023 6/21/2023     Left Right Right            Index: MP      Finger arom WNL              PIP                       DIP   2.5  cm               [place]                 Long:  MP                   PIP                   DIP   2.5 cm               [place]                 Ring:   MP                   PIP                   DIP   2.5 cm               [place]                 Small:  MP                    PIP                    DIP   2.5 cm               [place]                 Thumb: MP                     IP            Rad ADD/ABD            Pal ADD/ABD               Opposition     Tip of RF   *thumb extension pain along ulnar dorsal          Strength (Dynamometer) and Pinch Strength (Pinch Gauge)  Measured in pounds.    6/21/2023 6/21/2023     Left Right   Rung II  55 32   Rosales Pinch 20  16.5    3pt Pinch  16.5 14    2pt Pinch            Sensation: denied numbness and tingling     6/2/2023 6/2/2023     Left Right   Lef[place] Neeta       Normal 1.65-2.83       Diminished Light Touch 3.22-3.61       Diminished Protective 3.84-4.31       Loss of Protective 4.56-6.65       Untestable >6.65       2 Point Discrimination       Static       Dynamic                     Limitation/Restriction for FOTO initial eval; wrist Survey     Therapist reviewed FOTO scores for Jerry Turner on 6/2/2023.   FOTO documents entered into [employer] - see Media section.     Limitation Score: needs to be taken%        Treatment     Jerry received the treatments listed below:      supervised modalities after being cleared for contradictions: Fluidotherapy: To R hand for 10 min, continuous air, 115 deg, air speed 50 to decrease pain, edema & scar tissue, sensory re- education, and increased tissue extensibility prior to therex    manual therapy techniques were applied to the: R hand for 10 minutes, including:  - PROM: wrist ,and digital ext, Hook, Fist,  -STM: DFM (NT)    Therapeutic exercises to develop ROM for 25  minutes including:  - AAROM wrist flex/extension x 10+ reps  -AROM wrist with fist x 20 reps  -- thumb AROM palmar/radial, opposition to each digit x 10 reps (NT)  - wrist pre's with 4  lbs, 3 ways, 3/10 reps ea way   - pinch strengthening with blue clothespin      Therapeutic Activities- to promote increase in functional use of the R hand 10 min   liu-balane to improve ability to maintain a sustained  x 1.5  min  Hammer swings 20 to improve ability to maintain a sustained  x 20 reps (NT)  - red therabar  fa sup, fa pro, wrist ext, wrist flex, wrist rd, wrist ud 20 to improve ability to twist against resistance   -      Patient Education and Home Exercises     Education provided:   - HEP frequency   - Progress towards goals     Written Home Exercises Provided: yes.  Exercises were reviewed and Jerry was able to demonstrate them prior to the end of the session.  Jerry demonstrated good  understanding of the HEP provided. See EMR under Patient Instructions for exercises provided during therapy sessions.       Assessment     Jerry continues to tolerate progression of treatment well. C/O IP joint pain likely due to possible OA.  Symptoms of Gyon Canal and Cubital Syndromes seem to have resolved.  Jerry is progressing well towards his goals and there are no updates to goals at this time. Pt prognosis is Good.     Pt will continue to benefit from skilled outpatient occupational therapy to address the deficits listed in the problem list on initial evaluation provide pt/family education and to maximize pt's level of independence in the home and community environment.     Pt's spiritual, cultural and educational needs considered and pt agreeable to plan of care and goals.    Anticipated barriers to occupational therapy:     Goals:    Long term goals: (by d/c)  1)  Patient to be IND with HEP and modalities for pain management  2)  Increase ROM 5-10 degrees to increase functional hand use for ADLs/leisure activities  3)   Increase  strength 5-8 lbs. to carry laundry, carry groceries, sweep, and increase functional independence of right hand for ADLs/iADLs  4)   Increase pinch 3-4  psis for  Increase  in functional independence in ADLs/iADLs such as manipulating fasteners and opening containers  5) Patient to score at 20% or less on FOTO to demonstrate improved perception of functional rightUE Use.           Short term Goals: ( 4 weeks)   1)  Patient to be IND with HEP and modalities for pain management  2)  Increase ROM 3-5 degrees to increase functional hand use for ADLs/leisure activities (MET)  3)   Assess  strength when appropriate (MET)  4)   Assess pinch strength when appropriate (MET)  5)   Patient to score at 30 %or less  limitation on FOTO to demonstrate improved perception of functional right UE Use  Plan     Updates/Grading for next session: cont as lola Dutta, OT

## 2023-06-28 ENCOUNTER — CLINICAL SUPPORT (OUTPATIENT)
Dept: REHABILITATION | Facility: HOSPITAL | Age: 58
End: 2023-06-28
Payer: COMMERCIAL

## 2023-06-28 DIAGNOSIS — M25.60 DECREASED RANGE OF MOTION: Primary | ICD-10-CM

## 2023-06-28 DIAGNOSIS — R29.898 DECREASED GRIP STRENGTH OF RIGHT HAND: ICD-10-CM

## 2023-06-28 PROCEDURE — 97022 WHIRLPOOL THERAPY: CPT

## 2023-06-28 PROCEDURE — 97530 THERAPEUTIC ACTIVITIES: CPT

## 2023-06-28 PROCEDURE — 97110 THERAPEUTIC EXERCISES: CPT

## 2023-06-28 NOTE — PROGRESS NOTES
[facility]  Occupational Therapy Treatment Note     Date: 6/28/2023  Name: Jerry Turner  Clinic Number: [id number]    Therapy Diagnosis:   Encounter Diagnoses   Name Primary?    Decreased range of motion Yes    Decreased  strength of right hand              Physician: Joyce Hilario MD    Physician Orders: Eval and treat; remove sutures   Medical Diagnosis: G56.03 (ICD-10-CM) - Carpal tunnel syndrome, bilateral  Surgical Procedure and Date: 5/23/2023, Carpal tunnel release    Evaluation Date: 6/2/2023  Insurance Authorization Period Expiration:   Plan of Care Expiration: 8 weeks; 7/28/2023   Date of Return to MD: 6/6/2023  Visit # / Visits authorized:6 /20  FOTO: 41% limitation on 6/21/2023    Time In:  09:00 AM   Time Out: 09:57 AM  Total Billable Time: 57 minutes    Subjective     Pt reports continued very mild tingling in the median nerve distribution after hand use but that is better than prior to CTR.  Denies any ulnar nerve symptoms. C/Cis pain in the finger IP's.  He was compliant with home exercise program given last session.   Response to previous treatment: first tx  Functional change: light activity without difficulty       Pain: 0/10  Location: right hand      Objective     Objective Measures updated at progress report unless specified.  Edema. Measured in centimeters.    6/7/2023 6/7/2023     Left Right   2in. Above elbow       2in. Below elbow       Wrist Crease 16.8 cm 18.3 cm    Figure of 8       MCPs                Elbow and Wrist ROM. Measured in degrees.    6/7/2023 6/7/2023 6/21/2023 6/28/2023     Left Right Right Right   Elbow Ext/Flex         Supination/Pronation WNL/55 60/50 WNL    Wrist Ext/Flex 70/56 50/40 58/62 58/62   Wrist RD/UD   Unable due to pain  21/25    *verbalized severe pain along ulnar aspect of wrist      Hand ROM. Measured in degrees.    6/7/2023 6/7/2023 6/21/2023     Left Right Right            Index: MP      Finger arom WNL              PIP                        DIP   2.5 cm               [place]AND                 Long:  MP                   PIP                   DIP   2.5 cm               [place]                 Ring:   MP                   PIP                   DIP   2.5 cm               [place]                 Small:  MP                    PIP                    DIP   2.5 cm               [place]                 Thumb: MP                     IP            Rad ADD/ABD            Pal ADD/ABD               Opposition     Radial side Tip of SF   *thumb extension pain along ulnar dorsal          Strength (Dynamometer) and Pinch Strength (Pinch Gauge)  Measured in pounds.    6/21/2023 6/21/2023 6/28/2023     Left Right Right   Rung II  55 32 31   Rosales Pinch 20  16.5  18   3pt Pinch  16.5 14  16   2pt Pinch             Sensation: denied numbness and tingling     6/2/2023 6/2/2023     Left Right   [place] Neeta       Normal 1.65-2.83       Diminished Light Touch 3.22-3.61       Diminished Protective 3.84-4.31       Loss of Protective 4.56-6.65       Untestable >6.65       2 Point Discrimination       Static       Dynamic                     Limitation/Restriction for FOTO initial eval; wrist Survey     Therapist reviewed FOTO scores for Jerry Turner on 6/2/2023.   FOTO documents entered into [employer] - see Media section.     Limitation Score: needs to be taken%        Treatment     Jerry received the treatments listed below:      supervised modalities after being cleared for contradictions: Fluidotherapy: To R hand for 10 min, continuous air, 115 deg, air speed 50 to decrease pain, edema & scar tissue, sensory re- education, and increased tissue extensibility prior to therex    manual therapy techniques were applied to the: R hand for 5 minutes, including:  - PROM: wrist flex, wrist extensibilty x 5 min  -STM: DFM (NT)    Therapeutic exercises to develop ROM for 27  minutes including:  -reassessment of arom,  and strengths  - AAROM wrist flex/extension x 10+ reps  -AROM  wrist with fist x 20 reps  -- thumb AROM palmar/radial, opposition to each digit x 10 reps (NT)  - wrist pre's with 4 lbs, 3 ways, 3/10 reps ea way   - pinch strengthening with blue clothespin  -- pulling red web x 20 reps for  stregnthening.     Therapeutic Activities- to promote increase in functional use of the R hand 15 min   ilu-balane to improve ability to maintain a sustained  x 1.5  min  (NT)  Hammer swings 20 to improve ability to maintain a sustained  x 20 reps (NT)  - green therabar  fa sup, fa pro, wrist ext, wrist flex, wrist rd, wrist ud 20 to improve ability to twist against resistance   - weight bearing through red web x 20 reps  - table top weight bearing 10 sec holds, 2/10    Patient Education and Home Exercises     Education provided:   - HEP frequency   - Progress towards goals     Written Home Exercises Provided: yes.  Exercises were reviewed and Jerry was able to demonstrate them prior to the end of the session.  Jerry demonstrated good  understanding of the HEP provided. See EMR under Patient Instructions for exercises provided during therapy sessions.       Assessment     Upon formal reassessment, increase in pinch strengths noted.  remains very weak and is status quo. Wrist rom is status quo. Increased weighted bearing activities today an he tolerated well. Progressed resistive exercises as well.   Jerry is progressing well towards his goals and there are no updates to goals at this time. Pt prognosis is Good.     Pt will continue to benefit from skilled outpatient occupational therapy to address the deficits listed in the problem list on initial evaluation provide pt/family education and to maximize pt's level of independence in the home and community environment.     Pt's spiritual, cultural and educational needs considered and pt agreeable to plan of care and goals.    Anticipated barriers to occupational therapy:     Goals:    Long term goals: (by d/c)  1)  Patient to be IND  with HEP and modalities for pain management  2)  Increase ROM 5-10 degrees to increase functional hand use for ADLs/leisure activities  3)   Increase  strength 5-8 lbs. to carry laundry, carry groceries, sweep, and increase functional independence of right hand for ADLs/iADLs  4)   Increase pinch 3-4  psis for  Increase in functional independence in ADLs/iADLs such as manipulating fasteners and opening containers  5) Patient to score at 20% or less on FOTO to demonstrate improved perception of functional rightUE Use.           Short term Goals: ( 4 weeks)   1)  Patient to be IND with HEP and modalities for pain management  2)  Increase ROM 3-5 degrees to increase functional hand use for ADLs/leisure activities (MET)  3)   Assess  strength when appropriate (MET)  4)   Assess pinch strength when appropriate (MET)  5)   Patient to score at 30 %or less  limitation on FOTO to demonstrate improved perception of functional right UE Use  Plan     Updates/Grading for next session: cont as lola Dutta, OT

## 2023-07-12 ENCOUNTER — CLINICAL SUPPORT (OUTPATIENT)
Dept: REHABILITATION | Facility: HOSPITAL | Age: 58
End: 2023-07-12
Payer: COMMERCIAL

## 2023-07-12 DIAGNOSIS — M25.60 DECREASED RANGE OF MOTION: Primary | ICD-10-CM

## 2023-07-12 DIAGNOSIS — R29.898 DECREASED GRIP STRENGTH OF RIGHT HAND: ICD-10-CM

## 2023-07-12 PROCEDURE — 97530 THERAPEUTIC ACTIVITIES: CPT

## 2023-07-12 PROCEDURE — 97110 THERAPEUTIC EXERCISES: CPT

## 2023-07-12 PROCEDURE — 97022 WHIRLPOOL THERAPY: CPT

## 2023-07-12 PROCEDURE — 97140 MANUAL THERAPY 1/> REGIONS: CPT

## 2023-07-12 NOTE — PATIENT INSTRUCTIONS
[facility], OCCUPATIONAL THERAPY  HOME EXERCISE PROGRAM           Spiderman Nerve Mobilization  (Purpose: Brachial plexus & median nerve gliding and stretching. This exercise will assist in regaining arm motion that has been limited by neural restrictions.)  Starting position: With posture corrected, bring your hand to face level, start to bend wrist back ( fanning motion ) and forth as you straighten elbow moving entire arm out to the side     Ending position: While turning your head to follow your hand, reach sideways, palm up, keeping your hand at eye level. At the end of your reach tilt your fingers towards the floor.    Perform in sets of 3, with minimal hold at end range. As this is a nerve stretching exercise, transient increased symptoms are expected. This exercise should become easier over time. Space exercises out over the course of the day to minimize neural irritation. 3- 5 reps, 4-5 times a day.      Kaitlin Lemons OTR/L

## 2023-07-12 NOTE — PROGRESS NOTES
"      [facility]  Occupational Therapy Treatment Note     Date: 7/12/2023  Name: Jerry Turner  Clinic Number: [id number]    Therapy Diagnosis:   Encounter Diagnoses   Name Primary?    Decreased range of motion Yes    Decreased  strength of right hand          Physician: Joyce Hilario MD    Physician Orders: Eval and treat; remove sutures   Medical Diagnosis: G56.03 (ICD-10-CM) - Carpal tunnel syndrome, bilateral  Surgical Procedure and Date: 5/23/2023, Carpal tunnel release    Evaluation Date: 6/2/2023  Insurance Authorization Period Expiration:   Plan of Care Expiration: 8 weeks; 7/28/2023   Date of Return to MD: 6/6/2023  Visit # / Visits authorized:6 /20  FOTO: 41% limitation on 6/21/2023    Time In:  09:00 AM   Time Out: 09:57 AM  Total Billable Time: 57 minutes    Subjective     "When I'm at work its numb and tingling. At nighttime he reports tightness throughout his R arm up to proximal elbow."   He was compliant with home exercise program given last session.   Response to previous treatment:  no change   Functional change: light activity without difficulty       Pain: 0/10  Location: right hand      Objective     Objective Measures updated at progress report unless specified.  Edema. Measured in centimeters.    6/7/2023 6/7/2023     Left Right   2in. Above elbow       2in. Below elbow       Wrist Crease 16.8 cm 18.3 cm    Figure of 8       MCPs                Elbow and Wrist ROM. Measured in degrees.    6/7/2023 6/7/2023 6/21/2023 6/28/2023     Left Right Right Right   Elbow Ext/Flex         Supination/Pronation WNL/55 60/50 WNL    Wrist Ext/Flex 70/56 50/40 58/62 58/62   Wrist RD/UD   Unable due to pain  21/25    *verbalized severe pain along ulnar aspect of wrist      Hand ROM. Measured in degrees.    6/7/2023 6/7/2023 6/21/2023     Left Right Right            Index: MP      Finger arom WNL              PIP                       DIP   2.5 cm               [place]               "   Long:  MP                   PIP                   DIP   2.5 cm               [place]                 Ring:   MP                   PIP                   DIP   2.5 cm               [place]                 Small:  MP                    PIP                    DIP   2.5 cm               [place]                 Thumb: MP                     IP            Rad ADD/ABD            Pal ADD/ABD               Opposition     Radial side Tip of SF   *thumb extension pain along ulnar dorsal          Strength (Dynamometer) and Pinch Strength (Pinch Gauge)  Measured in pounds.    6/21/2023 6/21/2023 6/28/2023 7/12/2023     Left Right Right Right   Rung II  55 32 31 34.5   [place] Pinch 20  16.5  18 17.5   3pt Pinch  16.5 14  16 17.5    2pt Pinch              Sensation: denied numbness and tingling     6/2/2023 6/2/2023     Left Right   [place] Neeta       Normal 1.65-2.83       Diminished Light Touch 3.22-3.61       Diminished Protective 3.84-4.31       Loss of Protective 4.56-6.65       Untestable >6.65       2 Point Discrimination       Static       Dynamic                     Limitation/Restriction for FOTO initial eval; wrist Survey     Therapist reviewed FOTO scores for Jerry Turner on 6/2/2023.   FOTO documents entered into [email or web address] - see Media section.     Limitation Score: needs to be taken%        Treatment     Jerry received the treatments listed below:      supervised modalities after being cleared for contradictions: Fluidotherapy: To R hand for 10 min, continuous air, 115 deg, air speed 50 to decrease pain, edema & scar tissue, sensory re- education, and increased tissue extensibility prior to therex    manual therapy techniques were applied to the: R hand for 8 minutes, including:  - PROM: wrist flex, wrist extensibilty   -STM: DFM (NT)    Therapeutic exercises to develop ROM for 27  minutes including:  - horizontal abduction/adduction x 10 reps   - goal post wall slides x 10 reps   - wrist PRE with RED weighted ball (3.3 lb) static  holds (10s) x 4 reps   - AAROM wrist flex/extension x 10+ reps  - AROM wrist with fist x 20 reps  -- hook pull/flat palm push  red web x 20 reps for  stregnthening.   -- thumb AROM palmar/radial, opposition to each digit x 10 reps (NT)  - wrist pre's with 4 lbs, 3 ways, 3/10 reps ea way (NT)  - pinch strengthening with blue clothespin (NT)        Therapeutic Activities- to promote increase in functional use of the R hand 15 min  - Frisbee 2# CW/CCW 1 min each   - push/pull with YELLOW powerweb 5s each (1 min)    liu-balane to improve ability to maintain a sustained  x 1.5  min  (NT)  Hammer swings 20 to improve ability to maintain a sustained  x 20 reps (NT)  - green therabar  fa sup, fa pro, wrist ext, wrist flex, wrist rd, wrist ud 20 to improve ability to twist against resistance   - weight bearing through red web x 20 reps  - table top weight bearing 10 sec holds, 2/10    Patient Education and Home Exercises     Education provided:   - HEP frequency   - Progress towards goals     Written Home Exercises Provided: yes.  Exercises were reviewed and Jerry was able to demonstrate them prior to the end of the session.  Jerry demonstrated good  understanding of the HEP provided. See EMR under Patient Instructions for exercises provided during therapy sessions.       Assessment     Upon formal reassessment, increase in pinch strengths noted. Slight increase in  strength since last assessment. Cont to have ulnar nerve symptoms. He was positive tinels for cubital tunnel. Added nerve glide to address possible proximal nerve compression. Jerry is progressing well towards his goals and there are no updates to goals at this time. Pt prognosis is Good.     Pt will continue to benefit from skilled outpatient occupational therapy to address the deficits listed in the problem list on initial evaluation provide pt/family education and to maximize pt's level of independence in the home and community environment.      Pt's spiritual, cultural and educational needs considered and pt agreeable to plan of care and goals.    Anticipated barriers to occupational therapy:     Goals:    Long term goals: (by d/c)  1)  Patient to be IND with HEP and modalities for pain management  2)  Increase ROM 5-10 degrees to increase functional hand use for ADLs/leisure activities  3)   Increase  strength 5-8 lbs. to carry laundry, carry groceries, sweep, and increase functional independence of right hand for ADLs/iADLs  4)   Increase pinch 3-4  psis for  Increase in functional independence in ADLs/iADLs such as manipulating fasteners and opening containers  5) Patient to score at 20% or less on FOTO to demonstrate improved perception of functional rightUE Use.           Short term Goals: ( 4 weeks)   1)  Patient to be IND with HEP and modalities for pain management  2)  Increase ROM 3-5 degrees to increase functional hand use for ADLs/leisure activities (MET)  3)   Assess  strength when appropriate (MET)  4)   Assess pinch strength when appropriate (MET)  5)   Patient to score at 30 %or less  limitation on FOTO to demonstrate improved perception of functional right UE Use  Plan     Updates/Grading for next session: cont as lola Lemons, OT

## 2023-07-13 NOTE — PROGRESS NOTES
" [facility]  Occupational Therapy Treatment Note     Date: 7/14/2023  Name: Jerry Turner  Clinic Number: [id number]    Therapy Diagnosis:   Encounter Diagnoses   Name Primary?    Decreased range of motion Yes    Decreased  strength of right hand            Physician: Joyce Hilario MD    Physician Orders: Eval and treat; remove sutures   Medical Diagnosis: G56.03 (ICD-10-CM) - Carpal tunnel syndrome, bilateral  Surgical Procedure and Date: 5/23/2023, Carpal tunnel release    Evaluation Date: 6/2/2023  Insurance Authorization Period Expiration:   Plan of Care Expiration: 8 weeks; 7/28/2023   Date of Return to MD: 6/6/2023  Visit # / Visits authorized:6 /20  FOTO: 41% limitation on 6/21/2023    Time In:  09:00 AM   Time Out: 10:00 AM  Total Billable Time: 53 minutes    Subjective     C/O his fingers "bothering him". Reports slight tingling and numbness in finger tips at times. Reports continued painful popping in his elbow going from flexion to extension.   He was compliant with home exercise program given last session.     Response to previous treatment:  no change   Functional change: no change      Pain: Not Rated /10  Location: right hand      Objective     Objective Measures updated at progress report unless specified.  Edema. Measured in centimeters.    6/7/2023 6/7/2023 7/14/2023     Left Right Right   2in. Above elbow        2in. Below elbow        Wrist Crease 16.8 cm 18.3 cm  17.8 cm   Figure of 8        MCPs                 Elbow and Wrist ROM. Measured in degrees.    6/7/2023 6/7/2023 6/21/2023 6/28/2023 7/14/2023     Left Right Right Right Right   Elbow Ext/Flex          Supination/Pronation WNL/55 60/50 WNL     Wrist Ext/Flex 70/56 50/40 58/62 58/62 59/63   Wrist RD/UD   Unable due to pain  21/25     *verbalized severe pain along ulnar aspect of wrist      Hand ROM. Measured in degrees.    6/7/2023 6/7/2023 6/21/2023 7/14/2023     Left Right Right Right           "   Index: MP      Finger arom WNL               PIP                        DIP   2.5 cm                [place]                   Long:  MP                    PIP                    DIP   2.5 cm                [place]                   Ring:   MP                    PIP                    DIP   2.5 cm                [place]                   Small:  MP                     PIP                     DIP   2.5 cm                [place]                   Thumb: MP                      IP             Rad ADD/ABD             Pal ADD/ABD                Opposition     Radial side Tip of SF MP flex crease RF   *thumb extension pain along ulnar dorsal          Strength (Dynamometer) and Pinch Strength (Pinch Gauge)  Measured in pounds.    6/21/2023 6/21/2023 6/28/2023 7/12/2023     Left Right Right Right   Rung II  55 32 31 34.5   [place] Pinch 20  16.5  18 17.5   3pt Pinch  16.5 14  16 17.5    2pt Pinch              Sensation: denied numbness and tingling     6/2/2023 6/2/2023     Left Right   [place] Neeta       Normal 1.65-2.83       Diminished Light Touch 3.22-3.61       Diminished Protective 3.84-4.31       Loss of Protective 4.56-6.65       Untestable >6.65       2 Point Discrimination       Static       Dynamic                     Limitation/Restriction for FOTO initial eval; wrist Survey     Therapist reviewed FOTO scores for Jerry Turner on 6/2/2023.   FOTO documents entered into [employer] - see Media section.     Limitation Score: needs to be taken%        Treatment     Jerry received the treatments listed below:      supervised modalities after being cleared for contradictions: Fluidotherapy: To R hand for 10 min, continuous air, 115 deg, air speed 50 to decrease pain, edema & scar tissue, sensory re- education, and increased tissue extensibility prior to therex    Direct Contraindicated: Ultrasound to surgery site x 8 minutes, constant, 1.0 w/cm2, Lg 3 mghz to increase soft tissue extensibilty, decrease pain, and decrease venous  congestion     manual therapy techniques were applied to the: R hand for 10 minutes, including:  - PROM: wrist flex, wrist extension   Deep friction massage post ultrasound  - educated in self passive extensor wad stretch and flexor wad stretch .. Added to HEP        Therapeutic exercises to develop ROM for 25  minutes including:  Reassessment of arom  + tinel at [place]  -/+ tinel at Cubital Tunnel  - elbow flex test for Cubital Tunnel  - AAROM wrist flex/extension x 10+ reps  - AROM wrist with fist x 20 reps    Below not performed today due to lateral epicondylitis symptoms as well as ulnar nerve symptoms    - horizontal abduction/adduction x 10 reps (NT)  - goal post wall slides x 10 reps (NT)  - wrist PRE with RED weighted ball (3.3 lb) static holds (10s) x 4 reps (NT  -- thumb AROM palmar/radial, opposition to each digit x 10 reps (NT)  - wrist pre's with 4 lbs, 3 ways, 3/10 reps ea way (only 1/10 today due to lateral epicondyle pan (NT)  - pinch strengthening with blue clothespin (NT)  -- pulling red web x 20 reps for  stregnthening. (NT)      Therapeutic Activities- to promote increase in functional use of the R hand   Below not performed today due to lateral epicondylitis symptoms as well as ulnar nerve symptoms    (- Frisbee 2# CW/CCW 0 min each   - push/pull with YELLOW powerweb 5s each (0 min)    liu-balane to improve ability to maintain a sustained  x 1.5  min  (NT)  Hammer swings 20 to improve ability to maintain a sustained  x 20 reps (NT)  - green therabar  fa sup, fa pro, wrist ext, wrist flex, wrist rd, wrist ud 20 to improve ability to twist against resistance   - weight bearing through red web x 20 reps  - table top weight bearing 10 sec holds, 2/10)    Patient Education and Home Exercises     Education provided:   - HEP frequency   - Progress towards goals     Written Home Exercises Provided: yes.  Exercises were reviewed and Jerry was able to demonstrate them prior to the end of  the session.  Jerry demonstrated good  understanding of the HEP provided. See EMR under Patient Instructions for exercises provided during therapy sessions.       Assessment   Jerry presents with symptoms of lateral epicondylitis, [place] Syndrome, and possibly mild Cubital Tunnel. He has persistent localized edema at the volar wrist. Scarring is somewhat dense.  strength was  noted to have mildly improved last session. Paresthesias fluctuate in finger tips.  Pt cancelled his f/u with Dr. Hilario last week due to possibly going out of town. I helped him reschedule it through the portal. Added stretches today to address tenderness at lateral epicondyle.   Jerry is not progressing well towards his goals and there are no updates to goals at this time. Pt prognosis is Fair. Will send update to Dr. Hilario.     Pt will continue to benefit from skilled outpatient occupational therapy to address the deficits listed in the problem list on initial evaluation provide pt/family education and to maximize pt's level of independence in the home and community environment.     Pt's spiritual, cultural and educational needs considered and pt agreeable to plan of care and goals.    Anticipated barriers to occupational therapy:     Goals:    Long term goals: (by d/c)  1)  Patient to be IND with HEP and modalities for pain management  2)  Increase ROM 5-10 degrees to increase functional hand use for ADLs/leisure activities  3)   Increase  strength 5-8 lbs. to carry laundry, carry groceries, sweep, and increase functional independence of right hand for ADLs/iADLs  4)   Increase pinch 3-4  psis for  Increase in functional independence in ADLs/iADLs such as manipulating fasteners and opening containers  5) Patient to score at 20% or less on FOTO to demonstrate improved perception of functional rightUE Use.           Short term Goals: ( 4 weeks)   1)  Patient to be IND with HEP and modalities for pain management  2)  Increase ROM 3-5  degrees to increase functional hand use for ADLs/leisure activities (MET)  3)   Assess  strength when appropriate (MET)  4)   Assess pinch strength when appropriate (MET)  5)   Patient to score at 30 %or less  limitation on FOTO to demonstrate improved perception of functional right UE Use  Plan     Updates/Grading for next session: cont as lola    Lakisha Dutta, OT

## 2023-07-14 ENCOUNTER — CLINICAL SUPPORT (OUTPATIENT)
Dept: REHABILITATION | Facility: HOSPITAL | Age: 58
End: 2023-07-14
Payer: COMMERCIAL

## 2023-07-14 DIAGNOSIS — R29.898 DECREASED GRIP STRENGTH OF RIGHT HAND: ICD-10-CM

## 2023-07-14 DIAGNOSIS — M25.60 DECREASED RANGE OF MOTION: Primary | ICD-10-CM

## 2023-07-14 PROCEDURE — 97140 MANUAL THERAPY 1/> REGIONS: CPT

## 2023-07-14 PROCEDURE — 97035 APP MDLTY 1+ULTRASOUND EA 15: CPT

## 2023-07-14 PROCEDURE — 97022 WHIRLPOOL THERAPY: CPT

## 2023-07-14 PROCEDURE — 97110 THERAPEUTIC EXERCISES: CPT

## 2023-07-18 ENCOUNTER — CLINICAL SUPPORT (OUTPATIENT)
Dept: REHABILITATION | Facility: HOSPITAL | Age: 58
End: 2023-07-18
Payer: COMMERCIAL

## 2023-07-18 DIAGNOSIS — R29.898 DECREASED GRIP STRENGTH OF RIGHT HAND: ICD-10-CM

## 2023-07-18 DIAGNOSIS — M25.60 DECREASED RANGE OF MOTION: Primary | ICD-10-CM

## 2023-07-18 PROCEDURE — 97140 MANUAL THERAPY 1/> REGIONS: CPT

## 2023-07-18 PROCEDURE — 97110 THERAPEUTIC EXERCISES: CPT

## 2023-07-18 PROCEDURE — 97022 WHIRLPOOL THERAPY: CPT

## 2023-07-18 PROCEDURE — 97035 APP MDLTY 1+ULTRASOUND EA 15: CPT

## 2023-07-18 NOTE — PATIENT INSTRUCTIONS
[facility], OCCUPATIONAL THERAPY  HOME EXERCISE PROGRAM       Complete 5- 10 repetitions of each exercise 2 times a day.              Extend your arm in front of you keeping your elbow straight, and bend the wrist and fingers.  Extend your wrist and fingers.  Bend your elbow.  Bring your arm out to the side, bend your wrist and fingers.  Rotate your arm so that your palm is facing behind you.  Tilt your head to the opposite side.        If any of these exercises aggravate your hands, stop, rest and try returning again to the previous exercise performed without pain. Proceed at your own pace using pain tolerance as your guide.        ______________________________________   Hand Therapist

## 2023-07-18 NOTE — PROGRESS NOTES
[facility]  Occupational Therapy Treatment Note     Date: 7/18/2023  Name: Jerry Turner  Clinic Number: [id number]    Therapy Diagnosis:   No diagnosis found.          Physician: Joyce Hilario MD    Physician Orders: Eval and treat; remove sutures   Medical Diagnosis: G56.03 (ICD-10-CM) - Carpal tunnel syndrome, bilateral  Surgical Procedure and Date: 5/23/2023, Carpal tunnel release    Evaluation Date: 6/2/2023  Insurance Authorization Period Expiration:   Plan of Care Expiration: 8 weeks; 7/28/2023   Date of Return to MD: 6/6/2023  Visit # / Visits authorized:6 /20  FOTO: 41% limitation on 6/21/2023               31% limitation on 7/18/2023  Time In:  09:00 AM   Time Out: 09:50 AM (Pt requested to leave early)  Total Billable Time: 48 minutes    Subjective   Jerry reports that he does feel that he is making progress. However he continues to c/o pain in his fingers and forearm after hand use, beverley at night  He was compliant with home exercise program given last session.     Response to previous treatment:  no change   Functional change: no change      Pain: Not Rated /10  Location: right hand      Objective     Objective Measures updated at progress report unless specified.  Edema. Measured in centimeters.    6/7/2023 6/7/2023 7/14/2023     Left Right Right   2in. Above elbow        2in. Below elbow        Wrist Crease 16.8 cm 18.3 cm  17.8 cm   Figure of 8        MCPs                 Elbow and Wrist ROM. Measured in degrees.    6/7/2023 6/7/2023 6/21/2023 6/28/2023 7/14/2023     Left Right Right Right Right   Elbow Ext/Flex          Supination/Pronation WNL/55 60/50 WNL     Wrist Ext/Flex 70/56 50/40 58/62 58/62 59/63   Wrist RD/UD   Unable due to pain  21/25     *verbalized severe pain along ulnar aspect of wrist      Hand ROM. Measured in degrees.    6/7/2023 6/7/2023 6/21/2023 7/14/2023     Left Right Right Right             Index: MP      Finger arom WNL               PIP                         DIP   2.5 cm                [place]                   Long:  MP                    PIP                    DIP   2.5 cm                [place]                   Ring:   MP                    PIP                    DIP   2.5 cm                [place]                   Small:  MP                     PIP                     DIP   2.5 cm                [place]                   Thumb: MP                      IP             Rad ADD/ABD             Pal ADD/ABD                Opposition     Radial side Tip of SF MP flex crease RF   *thumb extension pain along ulnar dorsal          Strength (Dynamometer) and Pinch Strength (Pinch Gauge)  Measured in pounds.    6/21/2023 6/21/2023 6/28/2023 7/12/2023     Left Right Right Right   Rung II  55 32 31 34.5   [place] Pinch 20  16.5  18 17.5   3pt Pinch  16.5 14  16 17.5    2pt Pinch              Sensation: denied numbness and tingling     6/2/2023 6/2/2023     Left Right   [place]       Normal 1.65-2.83       Diminished Light Touch 3.22-3.61       Diminished Protective 3.84-4.31       Loss of Protective 4.56-6.65       Untestable >6.65       2 Point Discrimination       Static       Dynamic                     Limitation/Restriction for FOTO initial eval; wrist Survey     Therapist reviewed FOTO scores for Jerry Turner on 6/2/2023.   FOTO documents entered into [email or web address] - see Media section.     Limitation Score: needs to be taken%        Treatment     Jerry received the treatments listed below:      supervised modalities after being cleared for contradictions: Fluidotherapy: To R hand for 10 min, continuous air, 115 deg, air speed 50 to decrease pain, edema & scar tissue, sensory re- education, and increased tissue extensibility prior to therex    Direct Contraindicated: Ultrasound to surgery site x 8 minutes, constant, 1.0 w/cm2, Lg 3 mghz to increase soft tissue extensibilty, decrease pain, and decrease venous congestion     manual therapy techniques were applied to the: R hand  for 10 minutes, including:  - PROM: wrist flex, wrist extension   Deep friction massage to sx site post ultrasound        Therapeutic exercises to develop ROM for 20  minutes including  + tinel at [place] (repeated test today)  - AAROM wrist flex/extension x 10+ reps  - AROM wrist with fist x 20 reps  - self stretching of extensor wad and flexor wad 1 rep each, 30 sec holds  -Ulnar nerve glide: 5+ reps Educated pt in this to be added to HEP  Below not performed today due to lateral epicondylitis symptoms as well as ulnar nerve symptoms    - horizontal abduction/adduction x 10 reps (NT)  - goal post wall slides x 10 reps (NT)  - wrist PRE with RED weighted ball (3.3 lb) static holds (10s) x 4 reps (NT  -- thumb AROM palmar/radial, opposition to each digit x 10 reps (NT)  - wrist pre's with 4 lbs, 3 ways, 3/10 reps ea way (only 1/10 today due to lateral epicondyle pan (NT)  - pinch strengthening with blue clothespin (NT)  -- pulling red web x 20 reps for  stregnthening. (NT)      Therapeutic Activities- to promote increase in functional use of the R hand   Below not performed today due to lateral epicondylitis symptoms as well as ulnar nerve symptoms    (- Frisbee 2# CW/CCW 0 min each   - push/pull with YELLOW powerweb 5s each (0 min)    liu-balane to improve ability to maintain a sustained  x 1.5  min  (NT)  Hammer swings 20 to improve ability to maintain a sustained  x 20 reps (NT)  - green therabar  fa sup, fa pro, wrist ext, wrist flex, wrist rd, wrist ud 20 to improve ability to twist against resistance   - weight bearing through red web x 20 reps  - table top weight bearing 10 sec holds, 2/10)    Patient Education and Home Exercises     Education provided:   - HEP frequency   - Progress towards goals     Written Home Exercises Provided: yes.  Exercises were reviewed and Jerry was able to demonstrate them prior to the end of the session.  Jerry demonstrated good  understanding of the HEP provided.  See EMR under Patient Instructions for exercises provided during therapy sessions.       Assessment   Jerry continues to present with symptoms of lateral epicondylitis, [place] Syndrome, and possibly mild Cubital Tunnel. He has persistent localized edema at the volar wrist. Scarring is somewhat dense. His FOTO score, however, has improved, indicating some progress.  Jerry is progressing well towards his goals and there are no updates to goals at this time.      Pt will continue to benefit from skilled outpatient occupational therapy to address the deficits listed in the problem list on initial evaluation provide pt/family education and to maximize pt's level of independence in the home and community environment.     Pt's spiritual, cultural and educational needs considered and pt agreeable to plan of care and goals.    Anticipated barriers to occupational therapy:     Goals:    Long term goals: (by d/c)  1)  Patient to be IND with HEP and modalities for pain management  2)  Increase ROM 5-10 degrees to increase functional hand use for ADLs/leisure activities  3)   Increase  strength 5-8 lbs. to carry laundry, carry groceries, sweep, and increase functional independence of right hand for ADLs/iADLs  4)   Increase pinch 3-4  psis for  Increase in functional independence in ADLs/iADLs such as manipulating fasteners and opening containers  5) Patient to score at 20% or less on FOTO to demonstrate improved perception of functional rightUE Use.           Short term Goals: ( 4 weeks)   1)  Patient to be IND with HEP and modalities for pain management  2)  Increase ROM 3-5 degrees to increase functional hand use for ADLs/leisure activities (MET)  3)   Assess  strength when appropriate (MET)  4)   Assess pinch strength when appropriate (MET)  5)   Patient to score at 30 %or less  limitation on FOTO to demonstrate improved perception of functional right UE Use  Plan     Updates/Grading for next session: cont as lola     Lakisha Dutta, OT

## 2023-07-20 ENCOUNTER — CLINICAL SUPPORT (OUTPATIENT)
Dept: REHABILITATION | Facility: HOSPITAL | Age: 58
End: 2023-07-20
Payer: COMMERCIAL

## 2023-07-20 DIAGNOSIS — M25.60 DECREASED RANGE OF MOTION: Primary | ICD-10-CM

## 2023-07-20 DIAGNOSIS — R29.898 DECREASED GRIP STRENGTH OF RIGHT HAND: ICD-10-CM

## 2023-07-20 PROCEDURE — 97110 THERAPEUTIC EXERCISES: CPT

## 2023-07-20 PROCEDURE — 97140 MANUAL THERAPY 1/> REGIONS: CPT

## 2023-07-20 PROCEDURE — 97022 WHIRLPOOL THERAPY: CPT

## 2023-07-20 PROCEDURE — 97035 APP MDLTY 1+ULTRASOUND EA 15: CPT

## 2023-07-20 NOTE — PROGRESS NOTES
[facility]  Occupational Therapy Treatment Note and Discharge [place]     Date: 7/20/2023  Name: Jerry Turner  Clinic Number: [id number]    Therapy Diagnosis:   Encounter Diagnoses   Name Primary?    Decreased range of motion Yes    Decreased  strength of right hand              Physician: Joyce Hilario MD    Physician Orders: Eval and treat; remove sutures   Medical Diagnosis: G56.03 (ICD-10-CM) - Carpal tunnel syndrome, bilateral  Surgical Procedure and Date: 5/23/2023, Carpal tunnel release    Evaluation Date: 6/2/2023  Insurance Authorization Period Expiration:   Plan of Care Expiration: 8 weeks; 7/28/2023   Date of Return to MD: 8/23/2023  Visit # / Visits authorized:9/20  FOTO: 41% limitation on 6/21/2023               31% limitation on 7/18/2023  Time In:  09:00 AM   Time Out: 09:50 AM   Total Billable Time: 50 minutes    Subjective   Jerry reports that he continues to have pain in his fingers and forearm after hand use, beverley at night. Stated that at times, his whole hand goes numb.He stated that he today will be his last day of therapy until he sees Dr. Hilario on 8/23/2023.  He was compliant with home exercise program given last session.     Response to previous treatment:  no change   Functional change: no change      Pain: Not Rated /10  Location: right hand      Objective     Objective Measures updated at progress report unless specified.  Edema. Measured in centimeters.    6/7/2023 6/7/2023 7/14/2023     Left Right Right   2in. Above elbow        2in. Below elbow        Wrist Crease 16.8 cm 18.3 cm  17.8 cm   Figure of 8        MCPs                 Elbow and Wrist ROM. Measured in degrees.    6/7/2023 6/7/2023 6/21/2023 6/28/2023 7/14/2023     Left Right Right Right Right   Elbow Ext/Flex          Supination/Pronation WNL/55 60/50 WNL     Wrist Ext/Flex 70/56 50/40 58/62 58/62 59/63   Wrist RD/UD   Unable due to pain  21/25     *verbalized severe pain along ulnar aspect  Dr. Barraza discussed with mom.  No appointment needed.   of wrist      Hand ROM. Measured in degrees.    6/7/2023 6/7/2023 6/21/2023 7/14/2023     Left Right Right Right             Index: MP      Finger arom WNL               PIP                        DIP   2.5 cm                [place]                   Long:  MP                    PIP                    DIP   2.5 cm                [place]                   Ring:   MP                    PIP                    DIP   2.5 cm                [place]                   Small:  MP                     PIP                     DIP   2.5 cm                [place]                   Thumb: MP                      IP             Rad ADD/ABD             Pal ADD/ABD                Opposition     Radial side Tip of SF MP flex crease RF   *thumb extension pain along ulnar dorsal          Strength (Dynamometer) and Pinch Strength (Pinch Gauge)  Measured in pounds.    6/21/2023 6/21/2023 6/28/2023 7/12/2023     Left Right Right Right   Rung II  55 32 31 34.5   [place] Pinch 20  16.5  18 17.5   3pt Pinch  16.5 14  16 17.5    2pt Pinch              Sensation: denied numbness and tingling     6/2/2023 6/2/2023     Left Right   [place]       Normal 1.65-2.83       Diminished Light Touch 3.22-3.61       Diminished Protective 3.84-4.31       Loss of Protective 4.56-6.65       Untestable >6.65       2 Point Discrimination       Static       Dynamic                     Limitation/Restriction for FOTO initial eval; wrist Survey     Therapist reviewed FOTO scores for Jerry Turner on 6/2/2023.   FOTO documents entered into [employer] - see Media section.     Limitation Score: 41%        Treatment     Jerry received the treatments listed below:      supervised modalities after being cleared for contradictions: Fluidotherapy: To R hand for 10 min, continuous air, 115 deg, air speed 50 to decrease pain, edema & scar tissue, sensory re- education, and increased tissue extensibility prior to therex    Direct Contraindicated: Ultrasound to surgery site x 8 minutes,  constant, 1.0 w/cm2, Lg 3 mghz to increase soft tissue extensibilty, decrease pain, and decrease venous congestion     manual therapy techniques were applied to the: R hand for 17 minutes, including:  - PROM: wrist flex, wrist extension    Deep friction message , Vibratory massage, Deep tssue massage were performed to s x site and periphery post ultrasound        Therapeutic exercises to develop ROM for 15  minutes including  + tinel at [place]   - AAROM wrist flex/extension x 10+ reps  - AROM wrist with fist x 20 reps (NT)  - self stretching of extensor wad and flexor wad 1 rep each, 30 sec holds  -Ulnar nerve glide: 5+ reps    Below not performed today due to lateral epicondylitis symptoms as well as ulnar nerve symptoms    - horizontal abduction/adduction x 10 reps (NT)  - goal post wall slides x 10 reps (NT)  - wrist PRE with RED weighted ball (3.3 lb) static holds (10s) x 4 reps (NT  -- thumb AROM palmar/radial, opposition to each digit x 10 reps (NT)  - wrist pre's with 4 lbs, 3 ways, 3/10 reps ea way (only 1/10 today due to lateral epicondyle pan (NT)  - pinch strengthening with blue clothespin (NT)  -- pulling red web x 20 reps for  stregnthening. (NT)      Therapeutic Activities- to promote increase in functional use of the R hand   Below not performed today due to lateral epicondylitis symptoms as well as ulnar nerve symptoms    (- Frisbee 2# CW/CCW 0 min each   - push/pull with YELLOW powerweb 5s each (0 min)    liu-balane to improve ability to maintain a sustained  x 1.5  min  (NT)  Hammer swings 20 to improve ability to maintain a sustained  x 20 reps (NT)  - green therabar  fa sup, fa pro, wrist ext, wrist flex, wrist rd, wrist ud 20 to improve ability to twist against resistance   - weight bearing through red web x 20 reps  - table top weight bearing 10 sec holds, 2/10)    Patient Education and Home Exercises     Education provided:   - HEP frequency   - Progress towards goals      Written Home Exercises Provided: yes.  Exercises were reviewed and Jerry was able to demonstrate them prior to the end of the session.  Jerry demonstrated good  understanding of the HEP provided. See EMR under Patient Instructions for exercises provided during therapy sessions.       Assessment   Jerry has made some progress however, he continues to have pain,  tingling,  and numbness  in all fingers after work. He also reports significant pain in his forearm after work. He has ulnar nerve symptoms with + tinel and compression at [place], and possibly at the Cubital Tunnel. He has persistent localized edema at the volar wrist. Scarring is somewhat dense. He is also presenting with  point tenderness at the lateral epicondyle. The symptoms of all of these conditions has hampered the ability to progress his therapy program. Despite all of this his FOTO score has improved and he feels that he has benefited from this course of therapy.He has decided not to return to therapy until he has his f/u with Dr. Hilario on Aug 23 and I am in agreement with this decision. He is therefore discharged from OT for now with a home program of ulnar and median nerve glides, arom, and passive stretches of the extensor and flexor wads.       Goals:    Long term goals: (by d/c)  1)  Patient to be IND with HEP and modalities for pain management (MET)  2)  Increase ROM 5-10 degrees to increase functional hand use for ADLs/leisure activities(MET)  3)   Increase  strength 5-8 lbs. to carry laundry, carry groceries, sweep, and increase functional independence of right hand for ADLs/iADLs (Partially met)  4)   Increase pinch 3-4  psis for  Increase in functional independence in ADLs/iADLs such as manipulating fasteners and opening containers continues to present with symptoms of lateral epicondylitis,   5) Patient to score at 20% or less on FOTO to demonstrate improved perception of functional rightUE (Not met but is progressing)            Short term Goals: ( 4 weeks)   1)  Patient to be IND with HEP and modalities for pain management  2)  Increase ROM 3-5 degrees to increase functional hand use for ADLs/leisure activities (MET)  3)   Assess  strength when appropriate (MET)  4)   Assess pinch strength when appropriate (MET)   5)   Patient to score at 30 %or less  limitation on FOTO to demonstrate improved perception of functional right UE (Partially met. Current FOTO score is 31%  Plan   Discharged with HEP today.  Lakisha Dutta, OT

## 2023-07-24 ENCOUNTER — PATIENT MESSAGE (OUTPATIENT)
Dept: RESEARCH | Facility: HOSPITAL | Age: 58
End: 2023-07-24
Payer: COMMERCIAL

## 2023-07-28 DIAGNOSIS — L40.9 PSORIASIS: ICD-10-CM

## 2023-07-28 RX ORDER — IXEKIZUMAB 80 MG/ML
INJECTION, SOLUTION SUBCUTANEOUS
Qty: 1 ML | Refills: 2 | Status: SHIPPED | OUTPATIENT
Start: 2023-07-28 | End: 2023-10-23

## 2023-07-31 ENCOUNTER — PATIENT MESSAGE (OUTPATIENT)
Dept: DERMATOLOGY | Facility: CLINIC | Age: 58
End: 2023-07-31
Payer: COMMERCIAL

## 2023-08-03 DIAGNOSIS — L40.9 PSORIASIS: Primary | ICD-10-CM

## 2023-08-04 ENCOUNTER — PATIENT MESSAGE (OUTPATIENT)
Dept: DERMATOLOGY | Facility: CLINIC | Age: 58
End: 2023-08-04
Payer: COMMERCIAL

## 2023-08-04 ENCOUNTER — DOCUMENTATION ONLY (OUTPATIENT)
Dept: DERMATOLOGY | Facility: CLINIC | Age: 58
End: 2023-08-04
Payer: COMMERCIAL

## 2023-08-23 ENCOUNTER — OFFICE VISIT (OUTPATIENT)
Dept: ORTHOPEDICS | Facility: CLINIC | Age: 58
End: 2023-08-23
Payer: COMMERCIAL

## 2023-08-23 DIAGNOSIS — G56.03 CARPAL TUNNEL SYNDROME, BILATERAL: Primary | ICD-10-CM

## 2023-08-23 DIAGNOSIS — M77.11 RIGHT LATERAL EPICONDYLITIS: ICD-10-CM

## 2023-08-23 PROCEDURE — 1160F RVW MEDS BY RX/DR IN RCRD: CPT | Mod: CPTII,S$GLB,, | Performed by: ORTHOPAEDIC SURGERY

## 2023-08-23 PROCEDURE — 99999 PR PBB SHADOW E&M-EST. PATIENT-LVL III: CPT | Mod: PBBFAC,,, | Performed by: ORTHOPAEDIC SURGERY

## 2023-08-23 PROCEDURE — 99024 POSTOP FOLLOW-UP VISIT: CPT | Mod: S$GLB,,, | Performed by: ORTHOPAEDIC SURGERY

## 2023-08-23 PROCEDURE — 1160F PR REVIEW ALL MEDS BY PRESCRIBER/CLIN PHARMACIST DOCUMENTED: ICD-10-PCS | Mod: CPTII,S$GLB,, | Performed by: ORTHOPAEDIC SURGERY

## 2023-08-23 PROCEDURE — 99999 PR PBB SHADOW E&M-EST. PATIENT-LVL III: ICD-10-PCS | Mod: PBBFAC,,, | Performed by: ORTHOPAEDIC SURGERY

## 2023-08-23 PROCEDURE — 99024 PR POST-OP FOLLOW-UP VISIT: ICD-10-PCS | Mod: S$GLB,,, | Performed by: ORTHOPAEDIC SURGERY

## 2023-08-23 PROCEDURE — 1159F MED LIST DOCD IN RCRD: CPT | Mod: CPTII,S$GLB,, | Performed by: ORTHOPAEDIC SURGERY

## 2023-08-23 PROCEDURE — 1159F PR MEDICATION LIST DOCUMENTED IN MEDICAL RECORD: ICD-10-PCS | Mod: CPTII,S$GLB,, | Performed by: ORTHOPAEDIC SURGERY

## 2023-08-23 NOTE — PROGRESS NOTES
Jerry Turner presents for post-operative evaluation of   Encounter Diagnoses   Name Primary?    Carpal tunnel syndrome, bilateral Yes    Right lateral epicondylitis    The patient is now 3 months s/p Right carpal tunnel release // left carpal tunnel steroid injection.  Overall the patient reports doing well.   He feels that his dexterity is better on the right, he is still having some numbness in bilateral hands. He also reports new pain in the right elbow with gripping.    PE:    AA&O x 4.  NAD  HEENT:  NCAT, sclera nonicteric  Lungs:  Respirations are equal and unlabored.  CV:  2+ bilateral upper and lower extremity pulses.  MSK: The incision is well healed.  Full wrist and finger motion.  Neurovascularly intact and has 5/5 thenar and intrinsic musculature strength.  Tender to palpation right elbow, pain with resisted wrist extension.      A/P: Status post above, doing well  1) Continue with range of motion and strengthening  2) F/U in 2 months  3) Call with any questions/concerns in the interim        Joyce Hilario MD    Please be aware that this note has been generated with the assistance of [facility] voice-to-text.  Please excuse any spelling or grammatical errors.

## 2023-09-01 ENCOUNTER — LAB VISIT (OUTPATIENT)
Dept: LAB | Facility: HOSPITAL | Age: 58
End: 2023-09-01
Attending: DERMATOLOGY
Payer: COMMERCIAL

## 2023-09-01 DIAGNOSIS — L40.9 PSORIASIS: ICD-10-CM

## 2023-09-01 LAB
BASOPHILS # BLD AUTO: 0.04 K/UL (ref 0–0.2)
BASOPHILS NFR BLD: 0.6 % (ref 0–1.9)
DIFFERENTIAL METHOD: ABNORMAL
EOSINOPHIL # BLD AUTO: 0.4 K/UL (ref 0–0.5)
EOSINOPHIL NFR BLD: 5.3 % (ref 0–8)
ERYTHROCYTE [DISTWIDTH] IN BLOOD BY AUTOMATED COUNT: 13.5 % (ref 11.5–14.5)
HCT VFR BLD AUTO: 42.7 % (ref 40–54)
HGB BLD-MCNC: 14.2 G/DL (ref 14–18)
IMM GRANULOCYTES # BLD AUTO: 0.01 K/UL (ref 0–0.04)
IMM GRANULOCYTES NFR BLD AUTO: 0.2 % (ref 0–0.5)
LYMPHOCYTES # BLD AUTO: 2.9 K/UL (ref 1–4.8)
LYMPHOCYTES NFR BLD: 43.4 % (ref 18–48)
MCH RBC QN AUTO: 34.1 PG (ref 27–31)
MCHC RBC AUTO-ENTMCNC: 33.3 G/DL (ref 32–36)
MCV RBC AUTO: 102 FL (ref 82–98)
MONOCYTES # BLD AUTO: 0.6 K/UL (ref 0.3–1)
MONOCYTES NFR BLD: 8.5 % (ref 4–15)
NEUTROPHILS # BLD AUTO: 2.8 K/UL (ref 1.8–7.7)
NEUTROPHILS NFR BLD: 42 % (ref 38–73)
NRBC BLD-RTO: 0 /100 WBC
PLATELET # BLD AUTO: 198 K/UL (ref 150–450)
PMV BLD AUTO: 11.3 FL (ref 9.2–12.9)
RBC # BLD AUTO: 4.17 M/UL (ref 4.6–6.2)
WBC # BLD AUTO: 6.56 K/UL (ref 3.9–12.7)

## 2023-09-01 PROCEDURE — 85025 COMPLETE CBC W/AUTO DIFF WBC: CPT | Performed by: DERMATOLOGY

## 2023-09-01 PROCEDURE — 36415 COLL VENOUS BLD VENIPUNCTURE: CPT | Performed by: DERMATOLOGY

## 2023-10-18 DIAGNOSIS — M79.642 LEFT HAND PAIN: Primary | ICD-10-CM

## 2023-10-19 DIAGNOSIS — L40.9 PSORIASIS: ICD-10-CM

## 2023-10-23 ENCOUNTER — PATIENT MESSAGE (OUTPATIENT)
Dept: DERMATOLOGY | Facility: CLINIC | Age: 58
End: 2023-10-23
Payer: COMMERCIAL

## 2023-10-23 RX ORDER — IXEKIZUMAB 80 MG/ML
INJECTION, SOLUTION SUBCUTANEOUS
Qty: 1 ML | Refills: 0 | Status: SHIPPED | OUTPATIENT
Start: 2023-10-23 | End: 2023-12-07

## 2023-10-25 ENCOUNTER — PATIENT MESSAGE (OUTPATIENT)
Dept: ORTHOPEDICS | Facility: CLINIC | Age: 58
End: 2023-10-25

## 2023-10-25 ENCOUNTER — OFFICE VISIT (OUTPATIENT)
Dept: ORTHOPEDICS | Facility: CLINIC | Age: 58
End: 2023-10-25
Payer: COMMERCIAL

## 2023-10-25 ENCOUNTER — HOSPITAL ENCOUNTER (OUTPATIENT)
Dept: RADIOLOGY | Facility: OTHER | Age: 58
Discharge: HOME | End: 2023-10-25
Attending: ORTHOPAEDIC SURGERY
Payer: COMMERCIAL

## 2023-10-25 DIAGNOSIS — M25.521 CHRONIC ELBOW PAIN, RIGHT: Primary | ICD-10-CM

## 2023-10-25 DIAGNOSIS — M79.642 LEFT HAND PAIN: ICD-10-CM

## 2023-10-25 DIAGNOSIS — M77.11 RIGHT LATERAL EPICONDYLITIS: ICD-10-CM

## 2023-10-25 DIAGNOSIS — G89.29 CHRONIC ELBOW PAIN, RIGHT: Primary | ICD-10-CM

## 2023-10-25 PROCEDURE — 73130 X-RAY EXAM OF HAND: CPT | Mod: TC,FY,LT

## 2023-10-25 PROCEDURE — 73130 X-RAY EXAM OF HAND: CPT | Mod: 26,LT,, | Performed by: RADIOLOGY

## 2023-10-25 PROCEDURE — 99999 PR PBB SHADOW E&M-EST. PATIENT-LVL III: CPT | Mod: PBBFAC,,, | Performed by: ORTHOPAEDIC SURGERY

## 2023-10-25 PROCEDURE — 20605 DRAIN/INJ JOINT/BURSA W/O US: CPT | Mod: RT,S$GLB,, | Performed by: ORTHOPAEDIC SURGERY

## 2023-10-25 PROCEDURE — 20605 INTERMEDIATE JOINT ASPIRATION/INJECTION: ICD-10-PCS | Mod: RT,S$GLB,, | Performed by: ORTHOPAEDIC SURGERY

## 2023-10-25 PROCEDURE — 1159F MED LIST DOCD IN RCRD: CPT | Mod: CPTII,S$GLB,, | Performed by: ORTHOPAEDIC SURGERY

## 2023-10-25 PROCEDURE — 1160F PR REVIEW ALL MEDS BY PRESCRIBER/CLIN PHARMACIST DOCUMENTED: ICD-10-PCS | Mod: CPTII,S$GLB,, | Performed by: ORTHOPAEDIC SURGERY

## 2023-10-25 PROCEDURE — 99214 OFFICE O/P EST MOD 30 MIN: CPT | Mod: 25,S$GLB,, | Performed by: ORTHOPAEDIC SURGERY

## 2023-10-25 PROCEDURE — 99999 PR PBB SHADOW E&M-EST. PATIENT-LVL III: ICD-10-PCS | Mod: PBBFAC,,, | Performed by: ORTHOPAEDIC SURGERY

## 2023-10-25 PROCEDURE — 1160F RVW MEDS BY RX/DR IN RCRD: CPT | Mod: CPTII,S$GLB,, | Performed by: ORTHOPAEDIC SURGERY

## 2023-10-25 PROCEDURE — 1159F PR MEDICATION LIST DOCUMENTED IN MEDICAL RECORD: ICD-10-PCS | Mod: CPTII,S$GLB,, | Performed by: ORTHOPAEDIC SURGERY

## 2023-10-25 PROCEDURE — 73130 XR HAND COMPLETE 3 VIEW LEFT: ICD-10-PCS | Mod: 26,LT,, | Performed by: RADIOLOGY

## 2023-10-25 PROCEDURE — 99214 PR OFFICE/OUTPT VISIT, EST, LEVL IV, 30-39 MIN: ICD-10-PCS | Mod: 25,S$GLB,, | Performed by: ORTHOPAEDIC SURGERY

## 2023-10-25 RX ADMIN — METHYLPREDNISOLONE ACETATE 40 MG: 40 INJECTION, SUSPENSION INTRA-ARTICULAR; INTRALESIONAL; INTRAMUSCULAR; SOFT TISSUE at 01:10

## 2023-10-25 NOTE — PROGRESS NOTES
Jerry Turner presents for follow up evaluation of 5 months after his right carpal tunnel release.  The patient is doing well from his carpal tunnel release however he states that he does note clicking and pain of his right elbow especially after a long day of work.      The patient states that he was evaluated in our office for this same problem about 8 weeks ago but at that time he did not wish to pursue any treatment.  The patient now states that the clicking and locking and pain has become progressively worse and he is now interested in treatment for his tenosynovitis of the elbow.  Of note the patient did have mild carpal tunnel syndrome on the left but this is currently asymptomatic for him and he does not wish to pursue any treatment of this.  Encounter Diagnoses   Name Primary?    Chronic elbow pain, right Yes    Right lateral epicondylitis        PE:    AA&O x 4.  NAD  HEENT:  NCAT, sclera nonicteric  Lungs:  Respirations are equal and unlabored.  CV:  2+ bilateral upper and lower extremity pulses.  MSK:  Neurovascularly intact bilaterally.  5/5 thenar and intrinsic musculature strength.  Full range of motion hands, wrists and elbows.  Patient has incision on the right is well healed, patient does have tenderness to palpation over the lateral aspect of the right elbow, pain with resisted wrist extension, negative Phalen and Tinel sign on the left hand.    A/P:  Right lateral epicondylitis of the right elbow; status post carpal tunnel release approximately 5 months ago  1) I have referred him to therapy for ASTYM, extensor tendon glides and multi-modality therapy. I have instructed him on activity modification for lateral epicondylitis.  I will see him back in 6 weeks, sooner for any worsening of symptoms.      2) -I have offered him a selective injection. I have explained the risks, benefits, and alternatives of the procedure in detail.  The patient voices understanding and all questions have been answered. The  patient agrees to proceed as planned. So after a sterile prep of the skin in the normal fashion the right lateral epicondyle was injected using a 25 gauge needle with a combination of 1cc 1% plain lidocaine and 40 mg of methylprednisolone.  The patient is cautioned and immediate relief of pain is secondary to the local anesthetic and will be temporary.  After the anesthetic wears off there may be a increase in pain that may last for a few hours or a few days and they should use ice to help alleviate this flair up of pain. Patient tolerated the procedure well.          Joyce Hilario MD    Please be aware that this note has been generated with the assistance of [employer] voice-to-text.  Please excuse any spelling or grammatical errors.

## 2023-11-06 ENCOUNTER — PATIENT MESSAGE (OUTPATIENT)
Dept: DERMATOLOGY | Facility: CLINIC | Age: 58
End: 2023-11-06
Payer: COMMERCIAL

## 2023-11-21 ENCOUNTER — OFFICE VISIT (OUTPATIENT)
Dept: DERMATOLOGY | Facility: CLINIC | Age: 58
End: 2023-11-21
Payer: COMMERCIAL

## 2023-11-21 DIAGNOSIS — L40.9 PSORIASIS: Primary | ICD-10-CM

## 2023-11-21 PROCEDURE — 1159F PR MEDICATION LIST DOCUMENTED IN MEDICAL RECORD: ICD-10-PCS | Mod: CPTII,S$GLB,, | Performed by: DERMATOLOGY

## 2023-11-21 PROCEDURE — 99999 PR PBB SHADOW E&M-EST. PATIENT-LVL III: ICD-10-PCS | Mod: PBBFAC,,, | Performed by: DERMATOLOGY

## 2023-11-21 PROCEDURE — 99213 OFFICE O/P EST LOW 20 MIN: CPT | Mod: S$GLB,,, | Performed by: DERMATOLOGY

## 2023-11-21 PROCEDURE — 1160F RVW MEDS BY RX/DR IN RCRD: CPT | Mod: CPTII,S$GLB,, | Performed by: DERMATOLOGY

## 2023-11-21 PROCEDURE — 99213 PR OFFICE/OUTPT VISIT, EST, LEVL III, 20-29 MIN: ICD-10-PCS | Mod: S$GLB,,, | Performed by: DERMATOLOGY

## 2023-11-21 PROCEDURE — 99999 PR PBB SHADOW E&M-EST. PATIENT-LVL III: CPT | Mod: PBBFAC,,, | Performed by: DERMATOLOGY

## 2023-11-21 PROCEDURE — 1160F PR REVIEW ALL MEDS BY PRESCRIBER/CLIN PHARMACIST DOCUMENTED: ICD-10-PCS | Mod: CPTII,S$GLB,, | Performed by: DERMATOLOGY

## 2023-11-21 PROCEDURE — 1159F MED LIST DOCD IN RCRD: CPT | Mod: CPTII,S$GLB,, | Performed by: DERMATOLOGY

## 2023-11-21 NOTE — PROGRESS NOTES
Subjective:      Patient ID:  Jerry Turner is a 58 y.o. male who presents for   Chief Complaint   Patient presents with    Psoriasis     Psoriasis - Follow-up - last seen 3/20/23        Psoriasis - Follow-up  Symptom course: improving and resolved  Currently using: Taltz 80mg SQ q month. does not use topicals.  Affected locations: currently clear  Signs / symptoms: asymptomatic      Review of Systems   Constitutional:  Negative for fever, weight loss and weight gain.   HENT:  Negative for mouth sores (no tongue whiteness).    Respiratory:  Negative for cough and shortness of breath.         + smokes 1/2 ppd   Gastrointestinal:  Negative for nausea, vomiting, abdominal pain and diarrhea.   Musculoskeletal:  Positive for arthralgias (ankles and h/o gout).   Skin:  Positive for activity-related sunscreen use (only at beach). Negative for itching, rash, daily sunscreen use, recent sunburn and wears hat.        no ISR   Hematologic/Lymphatic: Does not bruise/bleed easily.       Objective:   Physical Exam   Constitutional: He appears well-developed and well-nourished. No distress.   Neurological: He is alert and oriented to person, place, and time. He is not disoriented.   Psychiatric: He has a normal mood and affect.   Skin:   Areas Examined (abnormalities noted in diagram):   RUE Inspected  LUE Inspection Performed  RLE Inspected  LLE Inspection Performed  Nails and Digits Inspection Performed                Diagram Legend     Erythematous scaling macule/papule c/w actinic keratosis       Vascular papule c/w angioma      Pigmented verrucoid papule/plaque c/w seborrheic keratosis      Yellow umbilicated papule c/w sebaceous hyperplasia      Irregularly shaped tan macule c/w lentigo     1-2 mm smooth white papules consistent with Milia      Movable subcutaneous cyst with punctum c/w epidermal inclusion cyst      Subcutaneous movable cyst c/w pilar cyst      Firm pink to brown papule c/w dermatofibroma      Pedunculated  fleshy papule(s) c/w skin tag(s)      Evenly pigmented macule c/w junctional nevus     Mildly variegated pigmented, slightly irregular-bordered macule c/w mildly atypical nevus      Flesh colored to evenly pigmented papule c/w intradermal nevus       Pink pearly papule/plaque c/w basal cell carcinoma      Erythematous hyperkeratotic cursted plaque c/w SCC      Surgical scar with no sign of skin cancer recurrence      Open and closed comedones      Inflammatory papules and pustules      Verrucoid papule consistent consistent with wart     Erythematous eczematous patches and plaques     Dystrophic onycholytic nail with subungual debris c/w onychomycosis     Umbilicated papule    Erythematous-base heme-crusted tan verrucoid plaque consistent with inflamed seborrheic keratosis     Erythematous Silvery Scaling Plaque c/w Psoriasis     See annotation  Lab Results   Component Value Date    WBC 6.56 09/01/2023    HGB 14.2 09/01/2023    HCT 42.7 09/01/2023     (H) 09/01/2023     09/01/2023       Lab Results   Component Value Date    TBGOLDPLUS Positive (A) 02/28/2019 5/2023 CXR nl    Assessment / Plan:        Psoriasis      Psoriasis  Today's Plan:      Cont Taltz at 80mg Sq q month ([facility])  Does not use topicals    F/u May 2024 with cbc and cxr      No follow-ups on file.

## 2023-11-21 NOTE — ASSESSMENT & PLAN NOTE
Today's Plan:      Cont Taltz at 80mg Sq q month ([facility])  Does not use topicals    F/u May 2024 with cbc and cxr

## 2023-11-22 RX ORDER — METHYLPREDNISOLONE ACETATE 40 MG/ML
40 INJECTION, SUSPENSION INTRA-ARTICULAR; INTRALESIONAL; INTRAMUSCULAR; SOFT TISSUE
Status: DISCONTINUED | OUTPATIENT
Start: 2023-10-25 | End: 2023-11-22 | Stop reason: HOSPADM

## 2023-12-04 ENCOUNTER — CLINICAL SUPPORT (OUTPATIENT)
Dept: REHABILITATION | Facility: HOSPITAL | Age: 58
End: 2023-12-04
Payer: COMMERCIAL

## 2023-12-04 DIAGNOSIS — M25.521 CHRONIC ELBOW PAIN, RIGHT: ICD-10-CM

## 2023-12-04 DIAGNOSIS — M25.621 DECREASED RANGE OF MOTION OF RIGHT ELBOW: Primary | ICD-10-CM

## 2023-12-04 DIAGNOSIS — R68.89 DECREASED FUNCTIONAL ACTIVITY TOLERANCE: ICD-10-CM

## 2023-12-04 DIAGNOSIS — G89.29 CHRONIC ELBOW PAIN, RIGHT: ICD-10-CM

## 2023-12-04 PROCEDURE — 97110 THERAPEUTIC EXERCISES: CPT | Mod: PO

## 2023-12-04 PROCEDURE — 97165 OT EVAL LOW COMPLEX 30 MIN: CPT | Mod: PO

## 2023-12-04 NOTE — PATIENT INSTRUCTIONS
[facility], OCCUPATIONAL THERAPY  HOME EXERCISE PROGRAM     Use heat as needed before and after activity: upto 10-15 minutes  Massage the elbow as needed in circular motions, upto 10 minutes          Composite wrist flexion stretch:  - Begin with your elbow bent and by your side  - Position hand palm down and make a gentle fist  - Use your other hand to gently bend fist down towards the floor  - You should feel a gentle stretch, no pain! If you need more of a stretch you can begin to straighten the elbow to the point you feel a comfortable stretch    Hold 30 seconds. Repeat 3x.      Alternative:      Composite wrist flexion stretch:  -Interlace your fingers as shown  -Then use your bottom most wrist to bend the top wrist into a more flexed position for a stretch.     Hold for 30 seconds. Repeat 3x.          Composite wrist extension stretch:  -Hold arm straight out in front of you, elbow extended, and palm up  -Use other hand to bend the wrist back  -Make sure to bend at the wrist, not at the finger tips! Avoid hyperextension of the fingers    Hold for 30 seconds. Repeat 3x.      Therapist: ESTHER Doe/MOOKIE

## 2023-12-04 NOTE — PLAN OF CARE
" [facility] Occupational Therapy  Initial Evaluation     Date: 12/4/2023  Patient: Jerry Turner  Chart Number: [id number]    Therapy Diagnosis:   1. Decreased range of motion of right elbow        2. Chronic elbow pain, right  Ambulatory referral/consult to Physical/Occupational Therapy      3. Decreased functional activity tolerance          Medical Diagnosis: M25.521 (ICD-10-CM) - Right elbow pain    Referring Physician: Lester Rodrigez MD  Physician Orders: eval and treat  Date of Return to MD: 12/6/23    Date of Injury: a couple months  Date of Surgery: NA    Evaluation Date: 12/4/2023  Authorization Period: 12/4/23 - 12/31/23  Plan of Care Expiration: 1/29/24  Visit #/ Visits Authorized: 1 of 1  FOTO Completion: Initial eval (12/4/2023)  FOTO #2:  FOTO #3:    Time In: 9:00 an  Time Out: 9:35 am  Total Appointment Time (timed & untimed codes): 35 min    Precautions: Standard    Subjective     History of Current Condition: Jerry Turner is a 58 y.o. year old Right hand dominant male who has been experiencing "clicking" about the right lateral elbow in the last couple months. He saw the ortho MD and received a steroid injection and has noticed relief since. Jerry Turner is referred to Occupational Therapy for evaluation and treatment. Patient presents today alone.    Falls: none    Involved Side: Right  Dominant Side: Right    Date of Onset: upon return to work after CTR  Imaging: NA  Previous Therapy: OT for CTR on Right side on July 2023    Pain:  Functional Pain Scale Rating 0-10:   Current: 0/10  At Best: 0/10  At Worst: 0/10    Location: Right elbow  Description: clicking, no pain  Aggravating Factors: at the end of the work day  Easing Factors: rest    Functional Limitations/Social History:  Prior Level of Function: Independent with all ADLs/IADLs    Current Level of Function:   ADLs: difficulty fine motor tasks due to numbness in digits  IADLs: reports independence  Leisure:   Driving: " Yes    Occupation:  Electrical and   Working presently: employed  Duties: fine motor    Patient's Goals for Therapy: he reports he is more concerned about numbness in the radial digits    Past Medical History/Physical Systems Review:   Jerry Turner  has a past medical history of Psoriasis.    Jerry Turner  has a past surgical history that includes Dental graft; Spine surgery; Colonoscopy (N/A, 8/19/2022); Carpal tunnel release (Right, 5/23/2023); and Injection of steroid (Left, 5/23/2023).    Jerry has a current medication list which includes the following prescription(s): meloxicam, ondansetron, taltz autoinjector, tramadol, and triamcinolone acetonide 0.1%, and the following Facility-Administered Medications: triamcinolone acetonide.    Review of patient's allergies indicates:  No Known Allergies       Objective       Mental status: alert, oriented x3    Observation/Appearance:   No swelling or bruising, skin color changes noted      Sensation: Median Distribution   Right  12/4/23    Monofilament Testing     Normal 1.65-2.83     Diminished Light Touch 3.22-3.61 3.61 ulnar nerve    Diminished Protective 3.84-4.31 4.31 median nerve    Loss of Protective 4.56-6.65     Untestable >6.65           ELBOW, WRIST RANGE OF MOTION:   Measured in degrees of active motion with goniometer   Right  12/4/23      Elbow Extension/Flexion WNL    Pronation/Supination 75/85    Wrist Extension/Flexion 60/55    Ulnar/Radial Deviation WNL    Composite Wrist Extension/Flexion /35        STRENGTH: (Measured in pounds using a Dynamometer and pinch meter)   Right  12/4/2023 Left  12/4/2023    Elbow Flexed - neutral 50     Elbow Flexed - sup 51     Elbow Flexed - pro 42 lb     Elbow Extended - neutral 47     Elbow Extended - sup 48     Elbow Extended - pro 48    Key 17 19   3 Pt 14 17   Tip         Special Testing  Palpation: moderate tenderness in extensor wad, no tenderness lateral epi        Treatment     Treatment  Time In: 9:25 am  Treatment Time Out: 9:35 am  Total Treatment time separate from Evaluation time: 10 min      Jerry performed therapeutic exercises for 10 minutes including:  Provided with and performed HEP: composite wrist flex/ext stretching  - Pt education re: anatomy related to diagnosis, expected healing time frame and body mechanics    Pt reports relief from the steroid injection in October. Main complaint is feeling of numbness in finger tips and difficulty with fine motor tasks at work. Discussed expected healing time frame from the CTR in June 2023. He prefers to continue independently with HEP for the elbow due to relief of pain from injection, and will call back as needed for follow up and progression.      Patient Education and Home Exercises     Patient/Family Education Provided:   - Role of OT, goals for OT, scheduling/cancellations - pt verbalized understanding. Discussed insurance limitations with patient.    Written Home Exercises Provided: yes.  Exercises were reviewed and Jerry was able to demonstrate them prior to the end of the session.  Jerry demonstrated good  understanding of the education provided. See EMR under Patient Instructions for exercises provided during therapy sessions.     Pt was advised to perform these exercises free of pain, and to stop performing them if pain occurs.      Assessment     Jerry Turner is a 58 y.o. male referred to outpatient occupational therapy and presents with a medical diagnosis of right elbow pain.      Following medical record review it is determined that pt will benefit from occupational therapy services in order to maximize pain free and/or functional use of right elbow. The following goals were discussed with the patient and patient is in agreement with them as to be addressed in the treatment plan. The patient's rehab potential is Good.     Anticipated barriers to occupational therapy: none    Plan of care discussed with patient: Yes    Patient's spiritual,  cultural and educational needs considered and patient is agreeable to the plan of care and goals as stated below:     Medical Necessity is demonstrated by the following  Occupational Profile/History  Co-morbidities and personal factors that may impact the plan of care [x] LOW: Brief chart review  [] MODERATE: Expanded chart review   [] HIGH: Extensive chart review    Moderate / High Support Documentation:      Examination  Performance deficits relating to physical, cognitive or psychosocial skills that result in activity limitations and/or participation restrictions  [x] LOW: addressing 1-3 Performance deficits  [] MODERATE: 3-5 Performance deficits  [] HIGH: 5+ Performance deficits (please support below)    Moderate / High Support Documentation:    Physical:  Muscle Power/Strength   Strength  Fine Motor Coordination    Cognitive:  No Deficits    Psychosocial:    Habits  Routines  Rituals     Treatment Options [x] LOW: Limited options  [] MODERATE: Several options  [] HIGH: Multiple options      Decision Making/ Complexity Score: low         The following goals were discussed with the patient and patient is in agreement with them as to be addressed in the treatment plan.     Long Term Goals (to be met by discharge):  Date Goal Met:     1.) Jerry Turner will demonstrate significantly improved functional performance from re-assessment as measured by a FOTO Intake score of more than 60.    Goal Status:   In progress    2.) Jerry Turner will return to near to prior level of function for ADLs and household management reporting independence or modified independence.    Goal Status:   In progress    3. Jerry Turner will report pain 2 out of 10 at worst to increase functional use of affected hand for work and leisure tasks.    Goal Status:   In progress     Short Term Goals (to be met by 1/1/24):  Date Goal Met:     Jerry Turner will be independent with home exercise program with written instructions.    Goal Status:   In progress     Jerry Turner will demonstrate 45 degrees of composite wrist flexion to improve functional performance in ADLs/work/leisure tasks.    Goal Status:   In progress    Jerry Turner will demonstrate WNL for  strength in all forearm and elbow positions to improve functional grasp for ADLs/work/leisure tasks.    Goal Status:   In progress    Jerry Turner will demonstrate the ability to complete work tasks without exacerbation of elbow clicking and pain.    Goal Status:   In progress       Plan     Pt to be treated by Occupational Therapy 2 times per week for 8 weeks during the certification period from 12/4/2023 to 1/29/24 to achieve the established goals.     Treatment to include: Paraffin, Fluidotherapy, Manual therapy/joint mobilizations, Modalities for pain management, US 3 mhz, Therapeutic exercises/activities., Iontophoresis with 2.0 cc Dexamethasone, Strengthening, Orthotic Fabrication/Fit/Training, Electrical Modalities, Joint Protection, and Energy Conservation, as well as any other treatments deemed necessary based on the patient's needs or progress.       Ann Escalera, OTR/L

## 2023-12-06 ENCOUNTER — OFFICE VISIT (OUTPATIENT)
Dept: ORTHOPEDICS | Facility: CLINIC | Age: 58
End: 2023-12-06
Payer: COMMERCIAL

## 2023-12-06 DIAGNOSIS — R20.2 NUMBNESS AND TINGLING IN RIGHT HAND: ICD-10-CM

## 2023-12-06 DIAGNOSIS — M77.11 RIGHT LATERAL EPICONDYLITIS: Primary | ICD-10-CM

## 2023-12-06 DIAGNOSIS — R20.0 NUMBNESS AND TINGLING IN RIGHT HAND: ICD-10-CM

## 2023-12-06 PROCEDURE — 1159F PR MEDICATION LIST DOCUMENTED IN MEDICAL RECORD: ICD-10-PCS | Mod: CPTII,95,, | Performed by: ORTHOPAEDIC SURGERY

## 2023-12-06 PROCEDURE — 1160F PR REVIEW ALL MEDS BY PRESCRIBER/CLIN PHARMACIST DOCUMENTED: ICD-10-PCS | Mod: CPTII,95,, | Performed by: ORTHOPAEDIC SURGERY

## 2023-12-06 PROCEDURE — 1160F RVW MEDS BY RX/DR IN RCRD: CPT | Mod: CPTII,95,, | Performed by: ORTHOPAEDIC SURGERY

## 2023-12-06 PROCEDURE — 1159F MED LIST DOCD IN RCRD: CPT | Mod: CPTII,95,, | Performed by: ORTHOPAEDIC SURGERY

## 2023-12-06 PROCEDURE — 99212 PR OFFICE/OUTPT VISIT, EST, LEVL II, 10-19 MIN: ICD-10-PCS | Mod: 95,,, | Performed by: ORTHOPAEDIC SURGERY

## 2023-12-06 PROCEDURE — 99212 OFFICE O/P EST SF 10 MIN: CPT | Mod: 95,,, | Performed by: ORTHOPAEDIC SURGERY

## 2023-12-06 NOTE — PROGRESS NOTES
Established Patient - Audio Only Telehealth Visit     The patient location is: [place]  The chief complaint leading to consultation is: follow up from right elbow pain  Visit type: Virtual visit with audio only (telephone)  Total time spent with patient: 5 mins       The reason for the audio only service rather than synchronous audio and video virtual visit was related to technical difficulties or patient preference/necessity.     Each patient to whom I provide medical services by telemedicine is:  (1) informed of the relationship between the physician and patient and the respective role of any other health care provider with respect to management of the patient; and (2) notified that they may decline to receive medical services by telemedicine and may withdraw from such care at any time. Patient verbally consented to receive this service via voice-only telephone call.       HPI:  Patient states that the steroid injection in October in his right lateral epicondyle gave him significant symptom relief.  He has a tended 1 therapy session and has been working on a home exercise program.  He continues to have intermittent numbness and tingling in the right hand, this is not getting worse.     Assessment and plan:  Continue with home exercise program right lateral epicondylitis.  If any worsening of numbness and tingling or weakness in the right arm and hand, we will have him return and possibly get a new EMG/NCS.                        This service was not originating from a related E/M service provided within the previous 7 days nor will  to an E/M service or procedure within the next 24 hours or my soonest available appointment.  Prevailing standard of care was able to be met in this audio-only visit.

## 2023-12-07 ENCOUNTER — OFFICE VISIT (OUTPATIENT)
Dept: FAMILY MEDICINE | Facility: CLINIC | Age: 58
End: 2023-12-07
Payer: COMMERCIAL

## 2023-12-07 VITALS
OXYGEN SATURATION: 98 % | WEIGHT: 172.38 LBS | BODY MASS INDEX: 27.7 KG/M2 | HEART RATE: 70 BPM | HEIGHT: 66 IN | DIASTOLIC BLOOD PRESSURE: 98 MMHG | SYSTOLIC BLOOD PRESSURE: 148 MMHG

## 2023-12-07 DIAGNOSIS — I10 PRIMARY HYPERTENSION: ICD-10-CM

## 2023-12-07 DIAGNOSIS — Z83.3 FAMILY HISTORY OF DIABETES MELLITUS: ICD-10-CM

## 2023-12-07 DIAGNOSIS — E66.3 OVERWEIGHT WITH BODY MASS INDEX (BMI) OF 27 TO 27.9 IN ADULT: ICD-10-CM

## 2023-12-07 DIAGNOSIS — Z12.2 SCREENING FOR LUNG CANCER: ICD-10-CM

## 2023-12-07 DIAGNOSIS — M72.2 PLANTAR FASCIITIS: ICD-10-CM

## 2023-12-07 DIAGNOSIS — Z00.01 ENCOUNTER FOR GENERAL ADULT MEDICAL EXAMINATION WITH ABNORMAL FINDINGS: Primary | ICD-10-CM

## 2023-12-07 DIAGNOSIS — Z12.5 SCREENING FOR PROSTATE CANCER: ICD-10-CM

## 2023-12-07 DIAGNOSIS — F17.200 TOBACCO USE DISORDER: ICD-10-CM

## 2023-12-07 DIAGNOSIS — Z28.21 INFLUENZA VACCINATION DECLINED: ICD-10-CM

## 2023-12-07 DIAGNOSIS — F10.90 HEAVY ALCOHOL USE: ICD-10-CM

## 2023-12-07 DIAGNOSIS — R73.03 PREDIABETES: ICD-10-CM

## 2023-12-07 DIAGNOSIS — N52.9 ERECTILE DYSFUNCTION, UNSPECIFIED ERECTILE DYSFUNCTION TYPE: ICD-10-CM

## 2023-12-07 PROBLEM — R68.89 DECREASED FUNCTIONAL ACTIVITY TOLERANCE: Status: RESOLVED | Noted: 2023-12-04 | Resolved: 2023-12-07

## 2023-12-07 PROBLEM — M25.60 DECREASED RANGE OF MOTION: Status: RESOLVED | Noted: 2023-06-09 | Resolved: 2023-12-07

## 2023-12-07 PROBLEM — M25.621 DECREASED RANGE OF MOTION OF RIGHT ELBOW: Status: RESOLVED | Noted: 2023-12-04 | Resolved: 2023-12-07

## 2023-12-07 PROBLEM — R29.898 DECREASED GRIP STRENGTH OF RIGHT HAND: Status: RESOLVED | Noted: 2023-06-09 | Resolved: 2023-12-07

## 2023-12-07 PROBLEM — G56.01 RIGHT CARPAL TUNNEL SYNDROME: Status: RESOLVED | Noted: 2023-05-23 | Resolved: 2023-12-07

## 2023-12-07 PROBLEM — R52 PAIN: Status: RESOLVED | Noted: 2023-06-07 | Resolved: 2023-12-07

## 2023-12-07 PROCEDURE — 3008F PR BODY MASS INDEX (BMI) DOCUMENTED: ICD-10-PCS | Mod: CPTII,S$GLB,, | Performed by: FAMILY MEDICINE

## 2023-12-07 PROCEDURE — 1159F MED LIST DOCD IN RCRD: CPT | Mod: CPTII,S$GLB,, | Performed by: FAMILY MEDICINE

## 2023-12-07 PROCEDURE — 1159F PR MEDICATION LIST DOCUMENTED IN MEDICAL RECORD: ICD-10-PCS | Mod: CPTII,S$GLB,, | Performed by: FAMILY MEDICINE

## 2023-12-07 PROCEDURE — 99396 PR PREVENTIVE VISIT,EST,40-64: ICD-10-PCS | Mod: S$GLB,,, | Performed by: FAMILY MEDICINE

## 2023-12-07 PROCEDURE — 3080F DIAST BP >= 90 MM HG: CPT | Mod: CPTII,S$GLB,, | Performed by: FAMILY MEDICINE

## 2023-12-07 PROCEDURE — 99999 PR PBB SHADOW E&M-EST. PATIENT-LVL IV: CPT | Mod: PBBFAC,,, | Performed by: FAMILY MEDICINE

## 2023-12-07 PROCEDURE — 3080F PR MOST RECENT DIASTOLIC BLOOD PRESSURE >= 90 MM HG: ICD-10-PCS | Mod: CPTII,S$GLB,, | Performed by: FAMILY MEDICINE

## 2023-12-07 PROCEDURE — 99396 PREV VISIT EST AGE 40-64: CPT | Mod: S$GLB,,, | Performed by: FAMILY MEDICINE

## 2023-12-07 PROCEDURE — 3008F BODY MASS INDEX DOCD: CPT | Mod: CPTII,S$GLB,, | Performed by: FAMILY MEDICINE

## 2023-12-07 PROCEDURE — 1160F PR REVIEW ALL MEDS BY PRESCRIBER/CLIN PHARMACIST DOCUMENTED: ICD-10-PCS | Mod: CPTII,S$GLB,, | Performed by: FAMILY MEDICINE

## 2023-12-07 PROCEDURE — 1160F RVW MEDS BY RX/DR IN RCRD: CPT | Mod: CPTII,S$GLB,, | Performed by: FAMILY MEDICINE

## 2023-12-07 PROCEDURE — 3077F SYST BP >= 140 MM HG: CPT | Mod: CPTII,S$GLB,, | Performed by: FAMILY MEDICINE

## 2023-12-07 PROCEDURE — 99999 PR PBB SHADOW E&M-EST. PATIENT-LVL IV: ICD-10-PCS | Mod: PBBFAC,,, | Performed by: FAMILY MEDICINE

## 2023-12-07 PROCEDURE — 3077F PR MOST RECENT SYSTOLIC BLOOD PRESSURE >= 140 MM HG: ICD-10-PCS | Mod: CPTII,S$GLB,, | Performed by: FAMILY MEDICINE

## 2023-12-07 RX ORDER — LOSARTAN POTASSIUM 50 MG/1
TABLET ORAL
Qty: 90 TABLET | Refills: 0 | Status: SHIPPED | OUTPATIENT
Start: 2023-12-07 | End: 2023-12-28 | Stop reason: SDUPTHER

## 2023-12-07 NOTE — PROGRESS NOTES
"Subjective:         Patient ID: Jerry Turner is a 58 y.o. male.    Chief Complaint: Establish Care    Patient Active Problem List   Diagnosis    Family history of diabetes mellitus    Psoriasis    Tobacco use disorder    Carpal tunnel syndrome, bilateral    Overweight with body mass index (BMI) of 27 to 27.9 in adult    Prediabetes    Primary hypertension    Heavy alcohol use      HPI    Jerry is a 58 y.o. male who presents today to establish care.   Works as .   Recently went to [place] - hiked uphill with dyspnea, but no issues downhill and no issues with regular physical labor. Arrived [place] one day prior to hike.   Heavy smoker since teen years. 0.5-1 ppd. Quit for 9 years in remote past. Not interested in smoking program or aids. Will stop on his own.   Heavy daily alcohol drinker, beer. Unclear if physical dependence.     Reports intermittent arch and heel pain on right and left feet. Alternates. No trauma. Wears combat type boots, non steel toe for work.     Review of Systems   All other systems reviewed and are negative.       Objective:     Vitals:    12/07/23 1020 12/07/23 1052   BP: (!) 160/100 (!) 148/98   BP Location: Right arm    Patient Position: Sitting    BP Method: Medium (Manual)    Pulse: 70    SpO2: 98%    Weight: 78.2 kg (172 lb 6.4 oz)    Height: 5' 6" (1.676 m)          Physical Exam  Vitals and nursing note reviewed.   Constitutional:       General: He is not in acute distress.     Appearance: Normal appearance. He is not ill-appearing, toxic-appearing or diaphoretic.   HENT:      Head: Normocephalic and atraumatic.   Eyes:      General: No scleral icterus.     Conjunctiva/sclera: Conjunctivae normal.   Cardiovascular:      Rate and Rhythm: Normal rate.      Heart sounds: Normal heart sounds. No murmur heard.  Pulmonary:      Effort: Pulmonary effort is normal. No respiratory distress.      Breath sounds: Normal breath sounds.   Abdominal:      General: Bowel sounds are normal. "   Skin:     Coloration: Skin is not pale.   Neurological:      Mental Status: He is alert. Mental status is at baseline.   Psychiatric:         Attention and Perception: Attention and perception normal.         Mood and Affect: Mood and affect normal.         Speech: Speech normal.         Behavior: Behavior normal.         Cognition and Memory: Cognition and memory normal.         Judgment: Judgment normal.           Assessment:       1. Encounter for general adult medical examination with abnormal findings    2. Primary hypertension    3. Prediabetes    4. Family history of diabetes mellitus    5. Screening for prostate cancer    6. Influenza vaccination declined    7. Overweight with body mass index (BMI) of 27 to 27.9 in adult    8. Tobacco use disorder    9. Heavy alcohol use    10. Plantar fasciitis    11. Screening for lung cancer    12. Erectile dysfunction, unspecified erectile dysfunction type          Plan:   Recent relevant labs results reviewed with patient.         1. Encounter for general adult medical examination with abnormal findings  -     Urinalysis, Reflex to Urine Culture Urine, Clean Catch; Future; Expected date: 12/07/2023  -     Hepatic Function Panel; Future; Expected date: 12/07/2023  -     Renal Function Panel; Future; Expected date: 12/07/2023  -     Lipid Panel; Future; Expected date: 12/07/2023  -     TSH; Future; Expected date: 12/07/2023  -     Hemoglobin A1C; Future; Expected date: 12/07/2023  -     CBC Auto Differential; Future; Expected date: 12/07/2023  - Risk and age appropriate anticipatory guidance.  reviewed and updated. Recommendations discussed with patient as appropriate.     2. Primary hypertension  -     losartan (COZAAR) 50 MG tablet; Take 1/2 tab po daily x 1 week for high blood pressure, then 1 tab po daily thereafter  Dispense: 90 tablet; Refill: 0  New dx. Likely has been chronic and ongoing.   Start on treatment. BP log and close follow up.     3. Prediabetes  4.  Family history of diabetes mellitus  -     Hemoglobin A1C; Future; Expected date: 12/07/2023    5. Screening for prostate cancer  -     PSA, Screening; Future; Expected date: 12/07/2023    6. Influenza vaccination declined    7. Overweight with body mass index (BMI) of 27 to 27.9 in adult    8. Tobacco use disorder  -     CT Chest Lung Screening Low Dose; Future; Expected date: 12/07/2023    9. Heavy alcohol use  - Encourage harm reduction    10. Plantar fasciitis  Home exercises, conservative treatment, supportive footwear.     11. Screening for lung cancer  -     CT Chest Lung Screening Low Dose; Future; Expected date: 12/07/2023    12. Erectile dysfunction  - declined sildenafil/Cialis  Discussed starting with BP mgmt and overall reduction in tob and etoh use.     Patient's questions answered. Plan reviewed with patient at the end of visit. Relevant precautions to chief complaint and reasons to seek further medical care or to contact the office sooner reviewed with patient.     Follow up in about 3 weeks (around 12/28/2023) for Hypertension Follow-up.

## 2023-12-11 ENCOUNTER — LAB VISIT (OUTPATIENT)
Dept: LAB | Facility: HOSPITAL | Age: 58
End: 2023-12-11
Attending: FAMILY MEDICINE
Payer: COMMERCIAL

## 2023-12-11 DIAGNOSIS — Z00.01 ENCOUNTER FOR GENERAL ADULT MEDICAL EXAMINATION WITH ABNORMAL FINDINGS: ICD-10-CM

## 2023-12-11 LAB
BILIRUB UR QL STRIP: NEGATIVE
CLARITY UR REFRACT.AUTO: CLEAR
COLOR UR AUTO: YELLOW
GLUCOSE UR QL STRIP: NEGATIVE
HGB UR QL STRIP: NEGATIVE
KETONES UR QL STRIP: NEGATIVE
LEUKOCYTE ESTERASE UR QL STRIP: NEGATIVE
NITRITE UR QL STRIP: NEGATIVE
PH UR STRIP: 6 [PH] (ref 5–8)
PROT UR QL STRIP: NEGATIVE
SP GR UR STRIP: 1.02 (ref 1–1.03)
URN SPEC COLLECT METH UR: NORMAL

## 2023-12-11 PROCEDURE — 81003 URINALYSIS AUTO W/O SCOPE: CPT | Performed by: FAMILY MEDICINE

## 2023-12-12 DIAGNOSIS — D72.19 OTHER EOSINOPHILIA: Primary | ICD-10-CM

## 2023-12-12 PROBLEM — D72.10 EOSINOPHILIA: Status: ACTIVE | Noted: 2023-12-12

## 2023-12-12 PROBLEM — E78.2 MIXED HYPERLIPIDEMIA: Status: ACTIVE | Noted: 2023-12-12

## 2023-12-14 ENCOUNTER — HOSPITAL ENCOUNTER (OUTPATIENT)
Dept: RADIOLOGY | Facility: HOSPITAL | Age: 58
Discharge: HOME | End: 2023-12-14
Attending: FAMILY MEDICINE
Payer: COMMERCIAL

## 2023-12-14 DIAGNOSIS — Z12.2 SCREENING FOR LUNG CANCER: ICD-10-CM

## 2023-12-14 DIAGNOSIS — F17.200 TOBACCO USE DISORDER: ICD-10-CM

## 2023-12-14 PROCEDURE — 71271 CT CHEST LUNG SCREENING LOW DOSE: ICD-10-PCS | Mod: 26,,, | Performed by: RADIOLOGY

## 2023-12-14 PROCEDURE — 71271 CT THORAX LUNG CANCER SCR C-: CPT | Mod: 26,,, | Performed by: RADIOLOGY

## 2023-12-14 PROCEDURE — 71271 CT THORAX LUNG CANCER SCR C-: CPT | Mod: TC

## 2023-12-20 ENCOUNTER — LAB VISIT (OUTPATIENT)
Dept: LAB | Facility: HOSPITAL | Age: 58
End: 2023-12-20
Attending: FAMILY MEDICINE
Payer: COMMERCIAL

## 2023-12-20 DIAGNOSIS — D72.19 OTHER EOSINOPHILIA: ICD-10-CM

## 2023-12-20 LAB
BASOPHILS # BLD AUTO: 0.05 K/UL (ref 0–0.2)
BASOPHILS NFR BLD: 0.7 % (ref 0–1.9)
CRP SERPL-MCNC: 10.7 MG/L (ref 0–8.2)
DIFFERENTIAL METHOD: ABNORMAL
EOSINOPHIL # BLD AUTO: 0.9 K/UL (ref 0–0.5)
EOSINOPHIL NFR BLD: 12.8 % (ref 0–8)
ERYTHROCYTE [DISTWIDTH] IN BLOOD BY AUTOMATED COUNT: 13.2 % (ref 11.5–14.5)
ERYTHROCYTE [SEDIMENTATION RATE] IN BLOOD BY PHOTOMETRIC METHOD: 18 MM/HR (ref 0–23)
HCT VFR BLD AUTO: 44.1 % (ref 40–54)
HGB BLD-MCNC: 14.7 G/DL (ref 14–18)
IMM GRANULOCYTES # BLD AUTO: 0.03 K/UL (ref 0–0.04)
IMM GRANULOCYTES NFR BLD AUTO: 0.4 % (ref 0–0.5)
LYMPHOCYTES # BLD AUTO: 2.2 K/UL (ref 1–4.8)
LYMPHOCYTES NFR BLD: 32.1 % (ref 18–48)
MCH RBC QN AUTO: 34.1 PG (ref 27–31)
MCHC RBC AUTO-ENTMCNC: 33.3 G/DL (ref 32–36)
MCV RBC AUTO: 102 FL (ref 82–98)
MONOCYTES # BLD AUTO: 0.6 K/UL (ref 0.3–1)
MONOCYTES NFR BLD: 8.5 % (ref 4–15)
NEUTROPHILS # BLD AUTO: 3.2 K/UL (ref 1.8–7.7)
NEUTROPHILS NFR BLD: 45.5 % (ref 38–73)
NRBC BLD-RTO: 0 /100 WBC
PATH REV BLD -IMP: NORMAL
PLATELET # BLD AUTO: 238 K/UL (ref 150–450)
PMV BLD AUTO: 11.6 FL (ref 9.2–12.9)
RBC # BLD AUTO: 4.31 M/UL (ref 4.6–6.2)
TROPONIN I SERPL DL<=0.01 NG/ML-MCNC: <0.006 NG/ML (ref 0–0.03)
VIT B12 SERPL-MCNC: 399 PG/ML (ref 210–950)
WBC # BLD AUTO: 6.94 K/UL (ref 3.9–12.7)

## 2023-12-20 PROCEDURE — 88271 CYTOGENETICS DNA PROBE: CPT | Mod: 59 | Performed by: FAMILY MEDICINE

## 2023-12-20 PROCEDURE — 85652 RBC SED RATE AUTOMATED: CPT | Performed by: FAMILY MEDICINE

## 2023-12-20 PROCEDURE — 85060 PATHOLOGIST REVIEW: ICD-10-PCS | Mod: ,,, | Performed by: PATHOLOGY

## 2023-12-20 PROCEDURE — 84484 ASSAY OF TROPONIN QUANT: CPT | Performed by: FAMILY MEDICINE

## 2023-12-20 PROCEDURE — 83520 IMMUNOASSAY QUANT NOS NONAB: CPT | Performed by: FAMILY MEDICINE

## 2023-12-20 PROCEDURE — 85025 COMPLETE CBC W/AUTO DIFF WBC: CPT | Performed by: FAMILY MEDICINE

## 2023-12-20 PROCEDURE — 82607 VITAMIN B-12: CPT | Performed by: FAMILY MEDICINE

## 2023-12-20 PROCEDURE — 86140 C-REACTIVE PROTEIN: CPT | Performed by: FAMILY MEDICINE

## 2023-12-20 PROCEDURE — 85060 BLOOD SMEAR INTERPRETATION: CPT | Mod: ,,, | Performed by: PATHOLOGY

## 2023-12-20 PROCEDURE — 36415 COLL VENOUS BLD VENIPUNCTURE: CPT | Mod: PO | Performed by: FAMILY MEDICINE

## 2023-12-21 LAB
PATH REV BLD -IMP: NORMAL
TRYPTASE LEVEL: 3.1 NG/ML

## 2023-12-28 ENCOUNTER — OFFICE VISIT (OUTPATIENT)
Dept: FAMILY MEDICINE | Facility: CLINIC | Age: 58
End: 2023-12-28
Payer: COMMERCIAL

## 2023-12-28 VITALS
DIASTOLIC BLOOD PRESSURE: 100 MMHG | SYSTOLIC BLOOD PRESSURE: 150 MMHG | OXYGEN SATURATION: 99 % | WEIGHT: 176.13 LBS | HEART RATE: 68 BPM | BODY MASS INDEX: 28.31 KG/M2 | HEIGHT: 66 IN

## 2023-12-28 DIAGNOSIS — R73.03 PREDIABETES: ICD-10-CM

## 2023-12-28 DIAGNOSIS — Z28.21 INFLUENZA VACCINATION DECLINED: ICD-10-CM

## 2023-12-28 DIAGNOSIS — F10.90 HEAVY ALCOHOL USE: ICD-10-CM

## 2023-12-28 DIAGNOSIS — E78.2 MIXED HYPERLIPIDEMIA: ICD-10-CM

## 2023-12-28 DIAGNOSIS — I10 PRIMARY HYPERTENSION: Primary | ICD-10-CM

## 2023-12-28 DIAGNOSIS — M79.672 LEFT FOOT PAIN: ICD-10-CM

## 2023-12-28 DIAGNOSIS — D72.19 OTHER EOSINOPHILIA: ICD-10-CM

## 2023-12-28 DIAGNOSIS — E53.8 B12 DEFICIENCY: ICD-10-CM

## 2023-12-28 LAB
ANNOTATION COMMENT IMP: NORMAL
CELLS W CYTOGENETIC ABNL BLD/T: NORMAL
CHIC2, 4Q12 DELETION RELEASED BY: NORMAL
CHIC2, 4Q12 DELETION, REASON FOR REFERRAL: NORMAL
CHROM ANALY RESULT (ISCN): NORMAL
CLINICAL CYTOGENETICIST REVIEW: NORMAL
LAB TEST METHOD: NORMAL
MOL DX INTERP BLD/T QL: NORMAL
SPECIMEN SOURCE: NORMAL
SPECIMEN SOURCE: NORMAL
TEST PERFORMANCE INFO SPEC: NORMAL

## 2023-12-28 PROCEDURE — 3080F DIAST BP >= 90 MM HG: CPT | Mod: CPTII,S$GLB,, | Performed by: FAMILY MEDICINE

## 2023-12-28 PROCEDURE — 3008F BODY MASS INDEX DOCD: CPT | Mod: CPTII,S$GLB,, | Performed by: FAMILY MEDICINE

## 2023-12-28 PROCEDURE — 3077F SYST BP >= 140 MM HG: CPT | Mod: CPTII,S$GLB,, | Performed by: FAMILY MEDICINE

## 2023-12-28 PROCEDURE — 99214 OFFICE O/P EST MOD 30 MIN: CPT | Mod: S$GLB,,, | Performed by: FAMILY MEDICINE

## 2023-12-28 PROCEDURE — 1160F RVW MEDS BY RX/DR IN RCRD: CPT | Mod: CPTII,S$GLB,, | Performed by: FAMILY MEDICINE

## 2023-12-28 PROCEDURE — 99999 PR PBB SHADOW E&M-EST. PATIENT-LVL III: CPT | Mod: PBBFAC,,, | Performed by: FAMILY MEDICINE

## 2023-12-28 PROCEDURE — 1159F MED LIST DOCD IN RCRD: CPT | Mod: CPTII,S$GLB,, | Performed by: FAMILY MEDICINE

## 2023-12-28 RX ORDER — LOSARTAN POTASSIUM 50 MG/1
TABLET ORAL
Qty: 90 TABLET | Refills: 0 | Status: SHIPPED | OUTPATIENT
Start: 2023-12-28 | End: 2023-12-28 | Stop reason: SDUPTHER

## 2023-12-28 RX ORDER — LANOLIN ALCOHOL/MO/W.PET/CERES
100 CREAM (GRAM) TOPICAL DAILY
Qty: 90 TABLET | Refills: 3 | Status: SHIPPED | OUTPATIENT
Start: 2023-12-28 | End: 2024-02-27 | Stop reason: SDUPTHER

## 2023-12-28 RX ORDER — FOLIC ACID 1 MG/1
1 TABLET ORAL DAILY
Qty: 90 TABLET | Refills: 3 | Status: SHIPPED | OUTPATIENT
Start: 2023-12-28 | End: 2024-02-27 | Stop reason: SDUPTHER

## 2023-12-28 RX ORDER — COLCHICINE 0.6 MG/1
0.6 TABLET ORAL 2 TIMES DAILY
Qty: 60 TABLET | Refills: 0 | Status: SHIPPED | OUTPATIENT
Start: 2023-12-28 | End: 2024-02-27 | Stop reason: SDUPTHER

## 2023-12-28 RX ORDER — FOLIC ACID 1 MG/1
1 TABLET ORAL DAILY
Qty: 90 TABLET | Refills: 3 | Status: SHIPPED | OUTPATIENT
Start: 2023-12-28 | End: 2023-12-28 | Stop reason: SDUPTHER

## 2023-12-28 RX ORDER — LANOLIN ALCOHOL/MO/W.PET/CERES
100 CREAM (GRAM) TOPICAL DAILY
Qty: 90 TABLET | Refills: 3 | Status: SHIPPED | OUTPATIENT
Start: 2023-12-28 | End: 2023-12-28 | Stop reason: SDUPTHER

## 2023-12-28 RX ORDER — LOSARTAN POTASSIUM 50 MG/1
TABLET ORAL
Qty: 90 TABLET | Refills: 0 | Status: SHIPPED | OUTPATIENT
Start: 2023-12-28 | End: 2024-02-27 | Stop reason: SDUPTHER

## 2023-12-28 NOTE — PROGRESS NOTES
"Subjective:         Patient ID: Lester Turner is a 58 y.o. male.    Chief Complaint: Hypertension and Follow-up    Patient Active Problem List   Diagnosis    Family history of diabetes mellitus    Psoriasis    Tobacco use disorder    Carpal tunnel syndrome, bilateral    Overweight with body mass index (BMI) of 27 to 27.9 in adult    Prediabetes    Primary hypertension    Heavy alcohol use    Mixed hyperlipidemia    Eosinophilia      Hypertension    Follow-up      Lester is a 58 y.o. male who presents today for HTN follow up and results review.    The 10-year ASCVD risk score (Kashmir VÁZQUEZ, et al., 2019) is: 20.7%    Values used to calculate the score:      Age: 58 years      Sex: Male      Is Non- : No      Diabetic: No      Tobacco smoker: Yes      Systolic Blood Pressure: 150 mmHg      Is BP treated: Yes      HDL Cholesterol: 61 mg/dL      Total Cholesterol: 237 mg/dL    Review of Systems   All other systems reviewed and are negative.         Objective:     Vitals:    12/28/23 0953   BP: (!) 150/100   BP Location: Left arm   Patient Position: Sitting   BP Method: Medium (Manual)   Pulse: 68   SpO2: 99%   Weight: 79.9 kg (176 lb 2.4 oz)   Height: 5' 6" (1.676 m)         Physical Exam  Vitals and nursing note reviewed.   Constitutional:       General: He is not in acute distress.     Appearance: Normal appearance. He is not ill-appearing, toxic-appearing or diaphoretic.   HENT:      Head: Normocephalic and atraumatic.   Eyes:      General: No scleral icterus.     Conjunctiva/sclera: Conjunctivae normal.   Cardiovascular:      Rate and Rhythm: Normal rate.   Pulmonary:      Effort: Pulmonary effort is normal. No respiratory distress.   Skin:     Coloration: Skin is not pale.   Neurological:      Mental Status: He is alert. Mental status is at baseline.   Psychiatric:         Attention and Perception: Attention and perception normal.         Mood and Affect: Mood and affect normal.         Speech: " Speech normal.         Behavior: Behavior normal.         Cognition and Memory: Cognition and memory normal.         Judgment: Judgment normal.           Assessment:       1. Primary hypertension    2. Left foot pain    3. Prediabetes    4. Mixed hyperlipidemia    5. Heavy alcohol use    6. Other eosinophilia    7. B12 deficiency    8. Influenza vaccination declined          Plan:   Recent relevant labs results reviewed with patient.         1. Primary hypertension  -     losartan (COZAAR) 50 MG tablet; Take 1/2 tab po daily x 1 week for high blood pressure, then 1 tab po daily thereafter  Dispense: 90 tablet; Refill: 0  Uncontrolled  Take meds, reduce alcohol and smoking    2. Left foot pain  -     colchicine (COLCRYS) 0.6 mg tablet; Take 1 tablet (0.6 mg total) by mouth 2 (two) times daily. For foot pain  Dispense: 60 tablet; Refill: 0    3. Prediabetes  4. Mixed hyperlipidemia  Lifestyle modifications. Discussed adding statin, will revisit.    5. Heavy alcohol use  -     folic acid (FOLVITE) 1 MG tablet; Take 1 tablet (1 mg total) by mouth once daily.  Dispense: 90 tablet; Refill: 3  -     thiamine 100 MG tablet; Take 1 tablet (100 mg total) by mouth once daily.  Dispense: 90 tablet; Refill: 3    6. Other eosinophilia  Workup unremarkable - from heavy alcohol use and some chronic allergy response? No clinical red flush or other symptoms.   Will trend. Hematology referral if needed.     7. B12 deficiency  OTC replacement    8. Influenza vaccination declined    Patient's questions answered. Plan reviewed with patient at the end of visit. Relevant precautions to chief complaint and reasons to seek further medical care or to contact the office sooner reviewed with patient.     Follow up in about 6 weeks (around 2/8/2024) for Hypertension Follow-up.

## 2024-01-09 ENCOUNTER — PATIENT MESSAGE (OUTPATIENT)
Dept: DERMATOLOGY | Facility: CLINIC | Age: 59
End: 2024-01-09
Payer: COMMERCIAL

## 2024-01-09 DIAGNOSIS — L40.9 PSORIASIS: Primary | ICD-10-CM

## 2024-01-09 RX ORDER — IXEKIZUMAB 80 MG/ML
INJECTION, SOLUTION SUBCUTANEOUS
Qty: 1 ML | Refills: 3 | Status: SHIPPED | OUTPATIENT
Start: 2024-01-09

## 2024-01-12 ENCOUNTER — PATIENT MESSAGE (OUTPATIENT)
Dept: DERMATOLOGY | Facility: CLINIC | Age: 59
End: 2024-01-12
Payer: COMMERCIAL

## 2024-02-02 PROBLEM — M79.672 LEFT FOOT PAIN: Status: ACTIVE | Noted: 2024-02-02

## 2024-02-02 PROBLEM — E53.8 B12 DEFICIENCY: Status: ACTIVE | Noted: 2024-02-02

## 2024-02-06 DIAGNOSIS — Z79.899 ENCOUNTER FOR LONG-TERM (CURRENT) USE OF HIGH-RISK MEDICATION: Primary | ICD-10-CM

## 2024-02-27 ENCOUNTER — OFFICE VISIT (OUTPATIENT)
Dept: FAMILY MEDICINE | Facility: CLINIC | Age: 59
End: 2024-02-27
Payer: COMMERCIAL

## 2024-02-27 VITALS
HEIGHT: 66 IN | SYSTOLIC BLOOD PRESSURE: 140 MMHG | DIASTOLIC BLOOD PRESSURE: 92 MMHG | HEART RATE: 75 BPM | BODY MASS INDEX: 28.63 KG/M2 | WEIGHT: 178.13 LBS | OXYGEN SATURATION: 98 %

## 2024-02-27 DIAGNOSIS — E78.2 MIXED HYPERLIPIDEMIA: ICD-10-CM

## 2024-02-27 DIAGNOSIS — F17.200 TOBACCO USE DISORDER: ICD-10-CM

## 2024-02-27 DIAGNOSIS — F10.90 HEAVY ALCOHOL USE: ICD-10-CM

## 2024-02-27 DIAGNOSIS — M1A.4790 OTHER SECONDARY CHRONIC GOUT OF FOOT WITHOUT TOPHUS, UNSPECIFIED LATERALITY: ICD-10-CM

## 2024-02-27 DIAGNOSIS — L40.9 PSORIASIS: ICD-10-CM

## 2024-02-27 DIAGNOSIS — Z28.21 INFLUENZA VACCINATION DECLINED: ICD-10-CM

## 2024-02-27 DIAGNOSIS — D72.19 OTHER EOSINOPHILIA: ICD-10-CM

## 2024-02-27 DIAGNOSIS — R73.03 PREDIABETES: ICD-10-CM

## 2024-02-27 DIAGNOSIS — I10 PRIMARY HYPERTENSION: Primary | ICD-10-CM

## 2024-02-27 DIAGNOSIS — E53.8 B12 DEFICIENCY: ICD-10-CM

## 2024-02-27 PROCEDURE — 4010F ACE/ARB THERAPY RXD/TAKEN: CPT | Mod: CPTII,S$GLB,, | Performed by: FAMILY MEDICINE

## 2024-02-27 PROCEDURE — 1160F RVW MEDS BY RX/DR IN RCRD: CPT | Mod: CPTII,S$GLB,, | Performed by: FAMILY MEDICINE

## 2024-02-27 PROCEDURE — 99214 OFFICE O/P EST MOD 30 MIN: CPT | Mod: S$GLB,,, | Performed by: FAMILY MEDICINE

## 2024-02-27 PROCEDURE — 3080F DIAST BP >= 90 MM HG: CPT | Mod: CPTII,S$GLB,, | Performed by: FAMILY MEDICINE

## 2024-02-27 PROCEDURE — 99999 PR PBB SHADOW E&M-EST. PATIENT-LVL III: CPT | Mod: PBBFAC,,, | Performed by: FAMILY MEDICINE

## 2024-02-27 PROCEDURE — 1159F MED LIST DOCD IN RCRD: CPT | Mod: CPTII,S$GLB,, | Performed by: FAMILY MEDICINE

## 2024-02-27 PROCEDURE — 3008F BODY MASS INDEX DOCD: CPT | Mod: CPTII,S$GLB,, | Performed by: FAMILY MEDICINE

## 2024-02-27 PROCEDURE — 3077F SYST BP >= 140 MM HG: CPT | Mod: CPTII,S$GLB,, | Performed by: FAMILY MEDICINE

## 2024-02-27 RX ORDER — FOLIC ACID 1 MG/1
1 TABLET ORAL DAILY
Qty: 90 TABLET | Refills: 3 | Status: SHIPPED | OUTPATIENT
Start: 2024-02-27 | End: 2025-02-26

## 2024-02-27 RX ORDER — LANOLIN ALCOHOL/MO/W.PET/CERES
100 CREAM (GRAM) TOPICAL DAILY
Qty: 90 TABLET | Refills: 3 | Status: SHIPPED | OUTPATIENT
Start: 2024-02-27

## 2024-02-27 RX ORDER — LOSARTAN POTASSIUM 50 MG/1
50 TABLET ORAL DAILY
Qty: 90 TABLET | Refills: 0 | Status: SHIPPED | OUTPATIENT
Start: 2024-02-27 | End: 2024-06-06 | Stop reason: SDUPTHER

## 2024-02-27 RX ORDER — COLCHICINE 0.6 MG/1
0.6 TABLET ORAL DAILY
Qty: 90 TABLET | Refills: 1 | Status: SHIPPED | OUTPATIENT
Start: 2024-02-27

## 2024-02-27 NOTE — PROGRESS NOTES
"Subjective:         Patient ID: Lester Turner is a 58 y.o. male.    Chief Complaint: Hypertension    Patient Active Problem List   Diagnosis    Family history of diabetes mellitus    Psoriasis    Tobacco use disorder    Carpal tunnel syndrome, bilateral    Overweight with body mass index (BMI) of 28 to 28.9 in adult    Prediabetes    Primary hypertension    Heavy alcohol use    Mixed hyperlipidemia    Eosinophilia    B12 deficiency    Left foot pain      HPI    Lester is a 58 y.o. male who presents today for follow up chronic medical problems - mainly to check on BP.   Reports that his home readings are much better with systolic 120s and 70s-80s for diastolic.     Reports has reduced drinking to 2-3 beers daily. Trying to reduce smoking overall, trigger includes being stressed or anxious. Weary about anxiety meds causing altered personality.     Review of Systems   All other systems reviewed and are negative.       Objective:     Vitals:    02/27/24 0857   BP: (!) 140/92   BP Location: Left arm   Patient Position: Sitting   BP Method: Medium (Manual)   Pulse: 75   SpO2: 98%   Weight: 80.8 kg (178 lb 2.1 oz)   Height: 5' 6" (1.676 m)         Physical Exam  Vitals and nursing note reviewed.   Constitutional:       General: He is not in acute distress.     Appearance: Normal appearance. He is not ill-appearing, toxic-appearing or diaphoretic.   HENT:      Head: Normocephalic and atraumatic.   Eyes:      General: No scleral icterus.     Conjunctiva/sclera: Conjunctivae normal.   Cardiovascular:      Rate and Rhythm: Normal rate.   Pulmonary:      Effort: Pulmonary effort is normal. No respiratory distress.   Skin:     Coloration: Skin is not pale.   Neurological:      Mental Status: He is alert. Mental status is at baseline.   Psychiatric:         Attention and Perception: Attention and perception normal.         Mood and Affect: Mood and affect normal.         Speech: Speech normal.         Behavior: Behavior normal.         " Cognition and Memory: Cognition and memory normal.         Judgment: Judgment normal.       Assessment:       1. Primary hypertension    2. Mixed hyperlipidemia    3. 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 Other eosinophilia    5. B12 deficiency    6. Tobacco use disorder    7. Heavy alcohol use    8. Influenza vaccination declined    9. Psoriasis    10. Prediabetes        Plan:   Recent relevant labs results reviewed with patient.         1. Primary hypertension  -     losartan (COZAAR) 50 MG tablet; Take 1 tablet (50 mg total) by mouth once daily.  Dispense: 90 tablet; Refill: 0  -     Hepatic Function Panel; Future; Expected date: 02/27/2024  -     Renal Function Panel; Future; Expected date: 02/27/2024  Uncontrolled based on today's numbers but controlled with home readings. Bring cuff to next visit. Continue current meds. Reports some orthostasis with meds, will not increase dose. Hydrate well.     2. Mixed hyperlipidemia  -     Hepatic Function Panel; Future; Expected date: 02/27/2024  -     Lipid Panel; Future; Expected date: 02/27/2024  Reports he believes he is on statin, not on med list.   Dietary changes, follow up lipid panel.     3. 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 For foot pain  Dispense: 90 tablet; Refill: 1  -     URIC ACID; Future; Expected date: 02/27/2024  Has chronic bilateral feet pain that moves from different areas of the feet with unremarkable Xrays per patient. ?OA  NSAIDs prn.  Colchicine helped prior flare, will check for hyperuricemia.     4. Other eosinophilia  ?Due to psoriasis, unremarkable workup but eosinophilia work up   ?Some possible allergic reaction to EtOH, does not have red flush per patient  Consider Econ to heme or allergy if increases or concerning clinical symptoms develop.     5. B12  deficiency  -     Vitamin B12; Future; Expected date: 02/27/2024  Oral supplement    6. Tobacco use disorder  Harm reduction.    7. Heavy alcohol use  Comments:  Reduced daily drinking to 1-2 beers. Harm reduction  Orders:  -     folic acid (FOLVITE) 1 MG tablet; Take 1 tablet (1 mg total) by mouth once daily.  Dispense: 90 tablet; Refill: 3  -     thiamine 100 MG tablet; Take 1 tablet (100 mg total) by mouth once daily.  Dispense: 90 tablet; Refill: 3    8. Influenza vaccination declined    9. Psoriasis  Per dermatology  Improved on injection    10. Prediabetes  -     Hemoglobin A1C; Future; Expected date: 02/27/2024    Patient's questions answered. Plan reviewed with patient at the end of visit. Relevant precautions to chief complaint and reasons to seek further medical care or to contact the office sooner reviewed with patient.     Follow up in about 3 months (around 6/1/2024) for Hypertension Follow-up.

## 2024-03-05 ENCOUNTER — PATIENT MESSAGE (OUTPATIENT)
Dept: ADMINISTRATIVE | Facility: HOSPITAL | Age: 59
End: 2024-03-05
Payer: COMMERCIAL

## 2024-04-03 DIAGNOSIS — L40.9 PSORIASIS: ICD-10-CM

## 2024-04-03 RX ORDER — IXEKIZUMAB 80 MG/ML
INJECTION, SOLUTION SUBCUTANEOUS
Qty: 1 ML | Refills: 1 | Status: SHIPPED | OUTPATIENT
Start: 2024-04-03 | End: 2024-06-04

## 2024-05-09 ENCOUNTER — LAB VISIT (OUTPATIENT)
Dept: LAB | Facility: HOSPITAL | Age: 59
End: 2024-05-09
Attending: DERMATOLOGY
Payer: COMMERCIAL

## 2024-05-09 DIAGNOSIS — Z79.899 ENCOUNTER FOR LONG-TERM (CURRENT) USE OF HIGH-RISK MEDICATION: ICD-10-CM

## 2024-05-09 DIAGNOSIS — R73.03 PREDIABETES: ICD-10-CM

## 2024-05-09 DIAGNOSIS — E78.2 MIXED HYPERLIPIDEMIA: ICD-10-CM

## 2024-05-09 DIAGNOSIS — M1A.4790 OTHER SECONDARY CHRONIC GOUT OF FOOT WITHOUT TOPHUS, UNSPECIFIED LATERALITY: ICD-10-CM

## 2024-05-09 DIAGNOSIS — I10 PRIMARY HYPERTENSION: ICD-10-CM

## 2024-05-09 DIAGNOSIS — E53.8 B12 DEFICIENCY: ICD-10-CM

## 2024-05-09 LAB
ALBUMIN SERPL BCP-MCNC: 4.1 G/DL (ref 3.5–5.2)
ALBUMIN SERPL BCP-MCNC: 4.1 G/DL (ref 3.5–5.2)
ALP SERPL-CCNC: 64 U/L (ref 55–135)
ALT SERPL W/O P-5'-P-CCNC: 25 U/L (ref 10–44)
ANION GAP SERPL CALC-SCNC: 8 MMOL/L (ref 8–16)
AST SERPL-CCNC: 26 U/L (ref 10–40)
BASOPHILS # BLD AUTO: 0.05 K/UL (ref 0–0.2)
BASOPHILS NFR BLD: 0.9 % (ref 0–1.9)
BILIRUB DIRECT SERPL-MCNC: 0.1 MG/DL (ref 0.1–0.3)
BILIRUB SERPL-MCNC: 0.3 MG/DL (ref 0.1–1)
BUN SERPL-MCNC: 17 MG/DL (ref 6–20)
CALCIUM SERPL-MCNC: 9.5 MG/DL (ref 8.7–10.5)
CHLORIDE SERPL-SCNC: 108 MMOL/L (ref 95–110)
CHOLEST SERPL-MCNC: 233 MG/DL (ref 120–199)
CHOLEST/HDLC SERPL: 3.6 {RATIO} (ref 2–5)
CO2 SERPL-SCNC: 24 MMOL/L (ref 23–29)
CREAT SERPL-MCNC: 0.9 MG/DL (ref 0.5–1.4)
DIFFERENTIAL METHOD BLD: ABNORMAL
EOSINOPHIL # BLD AUTO: 0.5 K/UL (ref 0–0.5)
EOSINOPHIL NFR BLD: 8.5 % (ref 0–8)
ERYTHROCYTE [DISTWIDTH] IN BLOOD BY AUTOMATED COUNT: 13.8 % (ref 11.5–14.5)
EST. GFR  (NO RACE VARIABLE): >60 ML/MIN/1.73 M^2
ESTIMATED AVG GLUCOSE: 120 MG/DL (ref 68–131)
GLUCOSE SERPL-MCNC: 124 MG/DL (ref 70–110)
HBA1C MFR BLD: 5.8 % (ref 4–5.6)
HCT VFR BLD AUTO: 43.2 % (ref 40–54)
HDLC SERPL-MCNC: 64 MG/DL (ref 40–75)
HDLC SERPL: 27.5 % (ref 20–50)
HGB BLD-MCNC: 14.5 G/DL (ref 14–18)
IMM GRANULOCYTES # BLD AUTO: 0.01 K/UL (ref 0–0.04)
IMM GRANULOCYTES NFR BLD AUTO: 0.2 % (ref 0–0.5)
LDLC SERPL CALC-MCNC: 145.6 MG/DL (ref 63–159)
LYMPHOCYTES # BLD AUTO: 2.3 K/UL (ref 1–4.8)
LYMPHOCYTES NFR BLD: 40.7 % (ref 18–48)
MCH RBC QN AUTO: 33.8 PG (ref 27–31)
MCHC RBC AUTO-ENTMCNC: 33.6 G/DL (ref 32–36)
MCV RBC AUTO: 101 FL (ref 82–98)
MONOCYTES # BLD AUTO: 0.5 K/UL (ref 0.3–1)
MONOCYTES NFR BLD: 8.5 % (ref 4–15)
NEUTROPHILS # BLD AUTO: 2.4 K/UL (ref 1.8–7.7)
NEUTROPHILS NFR BLD: 41.2 % (ref 38–73)
NONHDLC SERPL-MCNC: 169 MG/DL
NRBC BLD-RTO: 0 /100 WBC
PHOSPHATE SERPL-MCNC: 3.3 MG/DL (ref 2.7–4.5)
PLATELET # BLD AUTO: 180 K/UL (ref 150–450)
PMV BLD AUTO: 11.2 FL (ref 9.2–12.9)
POTASSIUM SERPL-SCNC: 4 MMOL/L (ref 3.5–5.1)
PROT SERPL-MCNC: 7.3 G/DL (ref 6–8.4)
RBC # BLD AUTO: 4.29 M/UL (ref 4.6–6.2)
SODIUM SERPL-SCNC: 140 MMOL/L (ref 136–145)
TRIGL SERPL-MCNC: 117 MG/DL (ref 30–150)
URATE SERPL-MCNC: 8.5 MG/DL (ref 3.4–7)
VIT B12 SERPL-MCNC: 487 PG/ML (ref 210–950)
WBC # BLD AUTO: 5.75 K/UL (ref 3.9–12.7)

## 2024-05-09 PROCEDURE — 80061 LIPID PANEL: CPT | Performed by: FAMILY MEDICINE

## 2024-05-09 PROCEDURE — 36415 COLL VENOUS BLD VENIPUNCTURE: CPT | Performed by: FAMILY MEDICINE

## 2024-05-09 PROCEDURE — 84075 ASSAY ALKALINE PHOSPHATASE: CPT | Performed by: FAMILY MEDICINE

## 2024-05-09 PROCEDURE — 82607 VITAMIN B-12: CPT | Performed by: FAMILY MEDICINE

## 2024-05-09 PROCEDURE — 80069 RENAL FUNCTION PANEL: CPT | Performed by: FAMILY MEDICINE

## 2024-05-09 PROCEDURE — 84550 ASSAY OF BLOOD/URIC ACID: CPT | Performed by: FAMILY MEDICINE

## 2024-05-09 PROCEDURE — 83036 HEMOGLOBIN GLYCOSYLATED A1C: CPT | Performed by: FAMILY MEDICINE

## 2024-05-09 PROCEDURE — 85025 COMPLETE CBC W/AUTO DIFF WBC: CPT | Performed by: DERMATOLOGY

## 2024-05-13 ENCOUNTER — OFFICE VISIT (OUTPATIENT)
Dept: DERMATOLOGY | Facility: CLINIC | Age: 59
End: 2024-05-13
Payer: COMMERCIAL

## 2024-05-13 ENCOUNTER — HOSPITAL ENCOUNTER (OUTPATIENT)
Dept: RADIOLOGY | Facility: HOSPITAL | Age: 59
Discharge: HOME | End: 2024-05-13
Attending: DERMATOLOGY
Payer: COMMERCIAL

## 2024-05-13 DIAGNOSIS — L40.9 PSORIASIS: Primary | ICD-10-CM

## 2024-05-13 DIAGNOSIS — Z79.899 ENCOUNTER FOR LONG-TERM (CURRENT) USE OF HIGH-RISK MEDICATION: ICD-10-CM

## 2024-05-13 PROCEDURE — 99213 OFFICE O/P EST LOW 20 MIN: CPT | Mod: S$GLB,,, | Performed by: DERMATOLOGY

## 2024-05-13 PROCEDURE — 71046 X-RAY EXAM CHEST 2 VIEWS: CPT | Mod: 26,,, | Performed by: RADIOLOGY

## 2024-05-13 PROCEDURE — 3044F HG A1C LEVEL LT 7.0%: CPT | Mod: CPTII,S$GLB,, | Performed by: DERMATOLOGY

## 2024-05-13 PROCEDURE — 99999 PR PBB SHADOW E&M-EST. PATIENT-LVL III: CPT | Mod: PBBFAC,,, | Performed by: DERMATOLOGY

## 2024-05-13 PROCEDURE — 1159F MED LIST DOCD IN RCRD: CPT | Mod: CPTII,S$GLB,, | Performed by: DERMATOLOGY

## 2024-05-13 PROCEDURE — G2211 COMPLEX E/M VISIT ADD ON: HCPCS | Mod: S$GLB,,, | Performed by: DERMATOLOGY

## 2024-05-13 PROCEDURE — 71046 X-RAY EXAM CHEST 2 VIEWS: CPT | Mod: TC

## 2024-05-13 PROCEDURE — 4010F ACE/ARB THERAPY RXD/TAKEN: CPT | Mod: CPTII,S$GLB,, | Performed by: DERMATOLOGY

## 2024-05-13 PROCEDURE — 1160F RVW MEDS BY RX/DR IN RCRD: CPT | Mod: CPTII,S$GLB,, | Performed by: DERMATOLOGY

## 2024-05-13 NOTE — ASSESSMENT & PLAN NOTE
Today's Plan:      Cont taltz at 80mg SQ q month. Does not use topicals.  H/o treatment for TB (+ quant gold 20219). Needs CXR.    F/u 6 months with cbc

## 2024-05-13 NOTE — PROGRESS NOTES
Subjective:      Patient ID:  Lester Turner is a 58 y.o. male who presents for   Chief Complaint   Patient presents with    Psoriasis     Follow up     Psoriasis - Follow-up - last seen 11/21/23        Psoriasis - Follow-up  Symptom course: stable (small flare left forearm few days prior to Taltz)  Currently using: Taltz 80mg SQ q month. does not use topicals.  Affected locations: left arm  Signs / symptoms: asymptomatic      Review of Systems   Constitutional:  Negative for fever, weight loss and weight gain.   HENT:  Negative for mouth sores (no tongue whiteness).    Respiratory:  Negative for cough and shortness of breath.         + smokes 1/2 ppd   Gastrointestinal:  Negative for nausea, vomiting, abdominal pain and diarrhea.   Musculoskeletal:  Positive for arthralgias (ankles and h/o gout).   Skin:  Positive for itching (elbows when shot is due) and activity-related sunscreen use (only at beach). Negative for rash, daily sunscreen use, recent sunburn and wears hat.        no ISR   Hematologic/Lymphatic: Does not bruise/bleed easily.       Objective:   Physical Exam   Constitutional: He appears well-developed and well-nourished. No distress.   Neurological: He is alert and oriented to person, place, and time. He is not disoriented.   Psychiatric: He has a normal mood and affect.   Skin:   Areas Examined (abnormalities noted in diagram):   Scalp / Hair Palpated and Inspected  Head / Face Inspection Performed  Neck Inspection Performed  Chest / Axilla Inspection Performed  Abdomen Inspection Performed  Genitals / Buttocks / Groin Inspection Performed  Back Inspection Performed  RUE Inspected  LUE Inspection Performed  RLE Inspected  LLE Inspection Performed  Nails and Digits Inspection Performed            Diagram Legend     Erythematous scaling macule/papule c/w actinic keratosis       Vascular papule c/w angioma      Pigmented verrucoid papule/plaque c/w seborrheic keratosis      Yellow umbilicated papule c/w  sebaceous hyperplasia      Irregularly shaped tan macule c/w lentigo     1-2 mm smooth white papules consistent with Milia      Movable subcutaneous cyst with punctum c/w epidermal inclusion cyst      Subcutaneous movable cyst c/w pilar cyst      Firm pink to brown papule c/w dermatofibroma      Pedunculated fleshy papule(s) c/w skin tag(s)      Evenly pigmented macule c/w junctional nevus     Mildly variegated pigmented, slightly irregular-bordered macule c/w mildly atypical nevus      Flesh colored to evenly pigmented papule c/w intradermal nevus       Pink pearly papule/plaque c/w basal cell carcinoma      Erythematous hyperkeratotic cursted plaque c/w SCC      Surgical scar with no sign of skin cancer recurrence      Open and closed comedones      Inflammatory papules and pustules      Verrucoid papule consistent consistent with wart     Erythematous eczematous patches and plaques     Dystrophic onycholytic nail with subungual debris c/w onychomycosis     Umbilicated papule    Erythematous-base heme-crusted tan verrucoid plaque consistent with inflamed seborrheic keratosis     Erythematous Silvery Scaling Plaque c/w Psoriasis     See annotation    Lab Results   Component Value Date    WBC 5.75 05/09/2024    HGB 14.5 05/09/2024    HCT 43.2 05/09/2024     (H) 05/09/2024     05/09/2024         Lab Results   Component Value Date    TBGOLDPLUS Positive (A) 02/28/2019       Assessment / Plan:        Psoriasis      Psoriasis  Today's Plan:      Cont taltz at 80mg SQ q month. Does not use topicals.  H/o treatment for TB (+ quant gold 20219). Needs CXR.    F/u 6 months with cbc      No follow-ups on file.

## 2024-05-22 DIAGNOSIS — L40.9 PSORIASIS: ICD-10-CM

## 2024-06-03 ENCOUNTER — HOSPITAL ENCOUNTER (EMERGENCY)
Facility: HOSPITAL | Age: 59
Discharge: HOME | End: 2024-06-03
Attending: EMERGENCY MEDICINE
Payer: COMMERCIAL

## 2024-06-03 VITALS
TEMPERATURE: 99 F | OXYGEN SATURATION: 97 % | RESPIRATION RATE: 18 BRPM | HEART RATE: 75 BPM | DIASTOLIC BLOOD PRESSURE: 92 MMHG | SYSTOLIC BLOOD PRESSURE: 137 MMHG

## 2024-06-03 DIAGNOSIS — K57.92 DIVERTICULITIS: Primary | ICD-10-CM

## 2024-06-03 LAB
ALBUMIN SERPL BCP-MCNC: 4.3 G/DL (ref 3.5–5.2)
ALP SERPL-CCNC: 55 U/L (ref 55–135)
ALT SERPL W/O P-5'-P-CCNC: 20 U/L (ref 10–44)
ANION GAP SERPL CALC-SCNC: 11 MMOL/L (ref 8–16)
AST SERPL-CCNC: 24 U/L (ref 10–40)
BASOPHILS # BLD AUTO: 0.04 K/UL (ref 0–0.2)
BASOPHILS NFR BLD: 0.3 % (ref 0–1.9)
BILIRUB SERPL-MCNC: 1.2 MG/DL (ref 0.1–1)
BILIRUB UR QL STRIP: NEGATIVE
BUN SERPL-MCNC: 14 MG/DL (ref 6–20)
CALCIUM SERPL-MCNC: 9.8 MG/DL (ref 8.7–10.5)
CHLORIDE SERPL-SCNC: 100 MMOL/L (ref 95–110)
CLARITY UR: CLEAR
CO2 SERPL-SCNC: 27 MMOL/L (ref 23–29)
COLOR UR: YELLOW
CREAT SERPL-MCNC: 0.8 MG/DL (ref 0.5–1.4)
DIFFERENTIAL METHOD BLD: ABNORMAL
EOSINOPHIL # BLD AUTO: 0.1 K/UL (ref 0–0.5)
EOSINOPHIL NFR BLD: 1.2 % (ref 0–8)
ERYTHROCYTE [DISTWIDTH] IN BLOOD BY AUTOMATED COUNT: 13.6 % (ref 11.5–14.5)
EST. GFR  (NO RACE VARIABLE): >60 ML/MIN/1.73 M^2
GLUCOSE SERPL-MCNC: 102 MG/DL (ref 70–110)
GLUCOSE UR QL STRIP: NEGATIVE
HCT VFR BLD AUTO: 43.1 % (ref 40–54)
HGB BLD-MCNC: 14.8 G/DL (ref 14–18)
HGB UR QL STRIP: NEGATIVE
IMM GRANULOCYTES # BLD AUTO: 0.04 K/UL (ref 0–0.04)
IMM GRANULOCYTES NFR BLD AUTO: 0.3 % (ref 0–0.5)
KETONES UR QL STRIP: NEGATIVE
LEUKOCYTE ESTERASE UR QL STRIP: NEGATIVE
LIPASE SERPL-CCNC: 21 U/L (ref 4–60)
LYMPHOCYTES # BLD AUTO: 2 K/UL (ref 1–4.8)
LYMPHOCYTES NFR BLD: 17.2 % (ref 18–48)
MCH RBC QN AUTO: 34 PG (ref 27–31)
MCHC RBC AUTO-ENTMCNC: 34.3 G/DL (ref 32–36)
MCV RBC AUTO: 99 FL (ref 82–98)
MONOCYTES # BLD AUTO: 1.1 K/UL (ref 0.3–1)
MONOCYTES NFR BLD: 9.5 % (ref 4–15)
NEUTROPHILS # BLD AUTO: 8.3 K/UL (ref 1.8–7.7)
NEUTROPHILS NFR BLD: 71.5 % (ref 38–73)
NITRITE UR QL STRIP: NEGATIVE
NRBC BLD-RTO: 0 /100 WBC
PH UR STRIP: 7 [PH] (ref 5–8)
PLATELET # BLD AUTO: 190 K/UL (ref 150–450)
PMV BLD AUTO: 11 FL (ref 9.2–12.9)
POTASSIUM SERPL-SCNC: 4.4 MMOL/L (ref 3.5–5.1)
PROT SERPL-MCNC: 7.9 G/DL (ref 6–8.4)
PROT UR QL STRIP: NEGATIVE
RBC # BLD AUTO: 4.35 M/UL (ref 4.6–6.2)
SODIUM SERPL-SCNC: 138 MMOL/L (ref 136–145)
SP GR UR STRIP: 1.02 (ref 1–1.03)
URN SPEC COLLECT METH UR: NORMAL
UROBILINOGEN UR STRIP-ACNC: NEGATIVE EU/DL
WBC # BLD AUTO: 11.68 K/UL (ref 3.9–12.7)

## 2024-06-03 PROCEDURE — 83690 ASSAY OF LIPASE: CPT

## 2024-06-03 PROCEDURE — 81003 URINALYSIS AUTO W/O SCOPE: CPT

## 2024-06-03 PROCEDURE — 80053 COMPREHEN METABOLIC PANEL: CPT

## 2024-06-03 PROCEDURE — 25500020 PHARM REV CODE 255: Performed by: EMERGENCY MEDICINE

## 2024-06-03 PROCEDURE — 85025 COMPLETE CBC W/AUTO DIFF WBC: CPT

## 2024-06-03 PROCEDURE — 99285 EMERGENCY DEPT VISIT HI MDM: CPT | Mod: 25

## 2024-06-03 PROCEDURE — 25000003 PHARM REV CODE 250

## 2024-06-03 RX ORDER — AMOXICILLIN AND CLAVULANATE POTASSIUM 875; 125 MG/1; MG/1
1 TABLET, FILM COATED ORAL
Status: COMPLETED | OUTPATIENT
Start: 2024-06-03 | End: 2024-06-03

## 2024-06-03 RX ORDER — AMOXICILLIN AND CLAVULANATE POTASSIUM 875; 125 MG/1; MG/1
1 TABLET, FILM COATED ORAL 2 TIMES DAILY
Qty: 20 TABLET | Refills: 0 | Status: SHIPPED | OUTPATIENT
Start: 2024-06-03 | End: 2024-06-13

## 2024-06-03 RX ADMIN — IOHEXOL 75 ML: 350 INJECTION, SOLUTION INTRAVENOUS at 11:06

## 2024-06-03 RX ADMIN — AMOXICILLIN AND CLAVULANATE POTASSIUM 1 TABLET: 875; 125 TABLET, FILM COATED ORAL at 12:06

## 2024-06-03 NOTE — ED PROVIDER NOTES
Encounter Date: 6/3/2024       History     Chief Complaint   Patient presents with    Abdominal Pain     Patient presents to ED from home with c/o LLQ pain that began x3 days. LBM this morning, patient reports stool black in color. Denies dysuria/hematuria or N/V/D. Patient reports taking Advil @ 2 am for pain relief. Denies blood thinner use.      Patient is a 58-year-old male with past medical history of hypertension that presents to emergency department with abdominal pain.  States that he has had 2 days of abdominal pain that gradually progressed.  States that he has pain with bowel movements, pain in left lower quadrant.  Denies fevers or chills.  Denies nausea or vomiting.  In is controlled after taking Advil at home.  Admits colonoscopy 2 years ago was told he had a polyp which was removed.  Having regular bowel movements daily, denies hematochezia or melena.  Is tolerating diet.        The history is provided by medical records and the patient.     Review of patient's allergies indicates:  No Known Allergies  Past Medical History:   Diagnosis Date    Psoriasis      Past Surgical History:   Procedure Laterality Date    CARPAL TUNNEL RELEASE Right 5/23/2023    Procedure: RELEASE, CARPAL TUNNEL;  Surgeon: Joyce Hilario MD;  Location: [facility];  Service: Orthopedics;  Laterality: Right;  Local injection prior to prep    COLONOSCOPY N/A 8/19/2022    Procedure: COLONOSCOPY;  Surgeon: Ning Sam MD;  Location: [address]);  Service: Endoscopy;  Laterality: N/A;  Pt. is vaccinated. will bring card and show to lab day of proc.EC    Dental graft      INJECTION OF STEROID Left 5/23/2023    Procedure: INJECTION, STEROID;  Surgeon: Joyce Hilario MD;  Location: [facility];  Service: Orthopedics;  Laterality: Left;    SPINE SURGERY       Family History   Problem Relation Name Age of Onset    Diabetes Mother      Cancer Mother          Breast Cancer    Eczema Neg Hx      Psoriasis Neg Hx       Social History      Tobacco Use    Smoking status: Every Day     Current packs/day: 1.00     Average packs/day: 1 pack/day for 46.4 years (46.4 ttl pk-yrs)     Types: Cigarettes     Start date: 1/1/1978    Smokeless tobacco: Never    Tobacco comments:     Varied smoking depending on stress   Substance Use Topics    Alcohol use: Yes     Alcohol/week: 28.0 standard drinks of alcohol     Types: 28 Cans of beer per week     Comment: daily - 12oz 3-4 budlight    Drug use: Yes     Types: Marijuana     Review of Systems  ROS negative except as noted in HPI     Physical Exam     Initial Vitals [06/03/24 0822]   BP Pulse Resp Temp SpO2   (!) 166/94 86 18 98.2 °F (36.8 °C) 97 %      MAP       --         Physical Exam    Nursing note and vitals reviewed.  Constitutional: He appears well-developed and well-nourished.   HENT:   Head: Normocephalic.   Right Ear: External ear normal.   Left Ear: External ear normal.   Nose: Nose normal.   Mouth/Throat: Oropharynx is clear and moist.   Eyes: Conjunctivae are normal.   Cardiovascular:  Normal rate, regular rhythm, normal heart sounds and intact distal pulses.           Pulmonary/Chest: Breath sounds normal.   Abdominal: He exhibits distension. There is abdominal tenderness (LLQ).   Musculoskeletal:         General: Normal range of motion.     Neurological: He is alert and oriented to person, place, and time. He has normal strength.   Skin: Skin is warm. Capillary refill takes less than 2 seconds.   Psychiatric: He has a normal mood and affect.         ED Course   Procedures  Labs Reviewed   CBC W/ AUTO DIFFERENTIAL - Abnormal; Notable for the following components:       Result Value    RBC 4.35 (*)     MCV 99 (*)     MCH 34.0 (*)     Gran # (ANC) 8.3 (*)     Mono # 1.1 (*)     Lymph % 17.2 (*)     All other components within normal limits   COMPREHENSIVE METABOLIC PANEL - Abnormal; Notable for the following components:    Total Bilirubin 1.2 (*)     All other components within normal limits    LIPASE   URINALYSIS, REFLEX TO URINE CULTURE    Narrative:     Specimen Source->Urine          Imaging Results              CT Abdomen Pelvis With IV Contrast NO Oral Contrast (Final result)  Result time 06/03/24 11:43:38      Final result by Ronna Hill MD (06/03/24 11:43:38)                   Impression:      Acute diverticulitis, significant burden of descending colon and sigmoid diverticulosis.  Tiny air-filled diverticulum or few air bubbles/extraluminal air adjacent to the diseased sigmoid segment.  Contain micro perforation not excluded.  There is certainly no scattered free air or abdominal/pelvic abscess.  Follow-up colonoscopy recommended after treatment completed to rule out underlying malignancy.    Hepatic steatosis.      Electronically signed by: Ronna Hill MD  Date:    06/03/2024  Time:    11:43               Narrative:    EXAMINATION:  CT ABDOMEN PELVIS WITH IV CONTRAST    CLINICAL HISTORY:  LLQ abdominal pain;    TECHNIQUE:  Low dose axial images, sagittal and coronal reformations were obtained from the lung bases to the pubic symphysis following the IV administration of 75 mL of Omnipaque 350 .  Oral contrast was not given. Axial and coronal images reformatted.    COMPARISON:  None    FINDINGS:  The lung bases are clear.  No pleural effusion.  No pericardial effusion.    Diffuse hypoattenuation of the liver suggestive of hepatic steatosis with areas of fatty sparing adjacent to the gallbladder.  No liver lesions.  The biliary ducts are not dilated.    The gallbladder is present, no definite radiopaque stones seen within.    The distal esophagus and stomach appear normal.    The pancreas, spleen and bilateral adrenal glands appear normal.    The right kidney and right ureter appear normal.  The left kidney and left ureter appear normal.  The bladder is nondistended.  The prostate and seminal vesicles appear normal.    The abdominal aorta tapers normally, there is mild calcified  atherosclerotic plaque, no para-aortic lymphadenopathy.    There is a small segment sigmoid colon demonstrating circumferential wall thickening, numerous diverticula with adjacent significant fat stranding suggestive of acute diverticulitis.  No significant proximal stool retention.  No proximal bowel dilation.  No mesenteric adenopathy.  Few tiny air bubble adjacent to the disease segment could represent air containing diverticula or micro perforation.  The appendix is normal.  No free fluid.  No intra-abdominal or intrapelvic access.    The abdominal wall is intact.  Small fat containing left inguinal hernia.    The osseous structures demonstrate advanced degenerative disc disease with adjacent endplate sclerosis and severe disc space narrowing at L5-S1.                                       Medications   iohexoL (OMNIPAQUE 350) injection 75 mL (75 mLs Intravenous Given 6/3/24 1111)   amoxicillin-clavulanate 875-125mg per tablet 1 tablet (1 tablet Oral Given 6/3/24 1212)     Medical Decision Making  Patient is a 58-year-old male with past medical history of hypertension that presents to emergency department with abdominal pain.     On initial evaluation patient does have hypertension otherwise vitals are within normal limits.  He was alert and oriented speaking full sentences.  He was cooperative with history and physical examination    Differential for patient's presentation is highly concerning for cellulitis.  Patient had colonoscopy low service at that time.  Additionally had large polyp that was removed and recommend were colonoscopy in 3 years.  Also considering nephrolithiasis as pain initially migraine to left lower quadrant.  Low suspicion for GI bleed no history of hematochezia or melena.  Low suspicion for UTI, no urinary symptoms with history taking.    See ED course.  CT scan consistent with diverticulitis.  Patient given dose of amoxicillin as well as prescribed ten-day course of amoxicillin  outpatient.  Provided ER return precautions.  Patient expressed understanding and had no further questions.    Amount and/or Complexity of Data Reviewed  External Data Reviewed: radiology.     Details: Most recent colonoscopy in 2024:     Impression: - Perianal skin tags found on perianal exam.  - Hemorrhoids found on perianal exam.  - Diverticulosis in the sigmoid colon and in the  descending colon.  - One 11 mm polyp in the sigmoid colon, removed  with a hot snare. Resected and retrieved. Clip was  placed.  - Internal hemorrhoids.     Labs:  Decision-making details documented in ED Course.    Risk  Prescription drug management.              Attending Attestation:   Physician Attestation Statement for Resident:  As the supervising MD   Physician Attestation Statement: I have personally seen and examined this patient.   I agree with the above history.  -:   As the supervising MD I agree with the above PE.     As the supervising MD I agree with the above treatment, course, plan, and disposition.   -: The patient is a 58-year-old male with left lower quadrant abdominal pain for the past 2-3 days.  He is afebrile in his white count is normal.  The patient had a colonoscopy 2 years ago which showed colonic diverticuli and today his CT scan shows diverticulitis with no obvious abscess formation.  No free air.  He will be discharged with antibiotics and he will be instructed to return if his pain worsens or he starts running a fever.   I have reviewed and agree with the residents interpretation of the following: lab data and CT scans.                 ED Course as of 06/03/24 1453   Mon Jun 03, 2024   1040 Comprehensive metabolic panel(!)  No kidney dysfunction, there was an elevated bilirubin at 1.2, patient's symptoms LR left lower quadrant.  Low suspicion for cholecystitis clinically.  We will re-evaluate after CT scan [TK]   1040 CBC auto differential(!)  CBC is reassuring, no anemia, leukocytosis or thrombocytopenia.  [TK]      ED Course User Index  [TK] Lance Brasher DO                           Clinical Impression:  Final diagnoses:  [K57.92] Diverticulitis (Primary)          ED Disposition Condition    Discharge Stable          ED Prescriptions       Medication Sig Dispense Start Date End Date Auth. Provider    amoxicillin-clavulanate 875-125mg (AUGMENTIN) 875-125 mg per tablet Take 1 tablet by mouth 2 (two) times daily. for 10 days 20 tablet 6/3/2024 6/13/2024 Lance Brasher DO          Follow-up Information       Follow up With Specialties Details Why Contact Info    [place] - Emergency Dept Emergency Medicine  If symptoms worsen [address] [facility] [address] [phone]    Karen Gustafson MD Family Medicine  As needed for symptom follow up. [address] [facility] [id number] [phone]               Lance Brasher DO  Resident  06/03/24 1607

## 2024-06-03 NOTE — ED NOTES
Pt refused  when asked. Pt presents to ED with C/O L lower abd pain with onset x3 days ago. Pt states LBM was this am. Pt denies any pain or burning with urination, denies nausea or vomiting. Pt states ( it just really hunt). Pt states pain is 4/10 at rest and 10/10 with movement.

## 2024-06-03 NOTE — DISCHARGE INSTRUCTIONS
"We will prescribe you 10 day course of antibiotics. The antibiotic name is "Augmentin". You will need to take twice a day for 10 days.     If you get severe abdominal pain, fevers that don't improve with tylenol  please return to the ER as you may need more Antibiotics or repeat CT scan.   "

## 2024-06-04 RX ORDER — IXEKIZUMAB 80 MG/ML
INJECTION, SOLUTION SUBCUTANEOUS
Qty: 1 ML | Refills: 4 | Status: SHIPPED | OUTPATIENT
Start: 2024-06-04

## 2024-06-06 DIAGNOSIS — I10 PRIMARY HYPERTENSION: ICD-10-CM

## 2024-06-06 RX ORDER — LOSARTAN POTASSIUM 50 MG/1
50 TABLET ORAL DAILY
Qty: 90 TABLET | Refills: 0 | Status: SHIPPED | OUTPATIENT
Start: 2024-06-06

## 2024-06-06 NOTE — TELEPHONE ENCOUNTER
No care due was identified.  [facility] Embedded Care Due Messages. Reference number: [id number].   6/06/2024 8:34:56 AM CDT

## 2024-06-06 NOTE — TELEPHONE ENCOUNTER
Refill Routing Note   Medication(s) are not appropriate for processing by [facility] for the following reason(s):        Required vitals abnormal    [facility] action(s):  Defer      Medication Therapy Plan: 6/3/24 ed vist reason: Diverticulitis      Appointments  past 12m or future 3m with PCP    Date Provider   Last Visit   2/27/2024 Karen Gustafson MD   Next Visit   6/27/2024 Karen Gustafson MD   ED visits in past 90 days: 1        Note composed:9:37 AM 06/06/2024

## 2024-06-25 ENCOUNTER — PATIENT MESSAGE (OUTPATIENT)
Dept: ADMINISTRATIVE | Facility: HOSPITAL | Age: 59
End: 2024-06-25
Payer: COMMERCIAL

## 2024-06-27 ENCOUNTER — OFFICE VISIT (OUTPATIENT)
Dept: FAMILY MEDICINE | Facility: CLINIC | Age: 59
End: 2024-06-27
Payer: COMMERCIAL

## 2024-06-27 VITALS
BODY MASS INDEX: 27.97 KG/M2 | DIASTOLIC BLOOD PRESSURE: 80 MMHG | SYSTOLIC BLOOD PRESSURE: 120 MMHG | OXYGEN SATURATION: 97 % | WEIGHT: 173.31 LBS | HEART RATE: 102 BPM

## 2024-06-27 DIAGNOSIS — K76.0 HEPATIC STEATOSIS: ICD-10-CM

## 2024-06-27 DIAGNOSIS — D72.19 OTHER EOSINOPHILIA: ICD-10-CM

## 2024-06-27 DIAGNOSIS — E78.2 MIXED HYPERLIPIDEMIA: ICD-10-CM

## 2024-06-27 DIAGNOSIS — Z83.3 FAMILY HISTORY OF DIABETES MELLITUS: ICD-10-CM

## 2024-06-27 DIAGNOSIS — E53.8 B12 DEFICIENCY: ICD-10-CM

## 2024-06-27 DIAGNOSIS — F10.90 HEAVY ALCOHOL USE: ICD-10-CM

## 2024-06-27 DIAGNOSIS — I10 PRIMARY HYPERTENSION: Primary | ICD-10-CM

## 2024-06-27 DIAGNOSIS — M79.671 CHRONIC PAIN OF BOTH FEET: ICD-10-CM

## 2024-06-27 DIAGNOSIS — G89.29 CHRONIC PAIN OF BOTH FEET: ICD-10-CM

## 2024-06-27 DIAGNOSIS — F17.200 TOBACCO USE DISORDER: ICD-10-CM

## 2024-06-27 DIAGNOSIS — K40.90 LEFT INGUINAL HERNIA: ICD-10-CM

## 2024-06-27 DIAGNOSIS — M79.672 CHRONIC PAIN OF BOTH FEET: ICD-10-CM

## 2024-06-27 DIAGNOSIS — N52.9 ERECTILE DYSFUNCTION, UNSPECIFIED ERECTILE DYSFUNCTION TYPE: ICD-10-CM

## 2024-06-27 DIAGNOSIS — M25.541 ARTHRALGIA OF BOTH HANDS: ICD-10-CM

## 2024-06-27 DIAGNOSIS — R73.03 PREDIABETES: ICD-10-CM

## 2024-06-27 DIAGNOSIS — M25.542 ARTHRALGIA OF BOTH HANDS: ICD-10-CM

## 2024-06-27 DIAGNOSIS — M1A.4790 OTHER SECONDARY CHRONIC GOUT OF FOOT WITHOUT TOPHUS, UNSPECIFIED LATERALITY: ICD-10-CM

## 2024-06-27 PROBLEM — M1A.0790 CHRONIC GOUT OF FOOT: Status: ACTIVE | Noted: 2024-02-02

## 2024-06-27 PROBLEM — E66.3 OVERWEIGHT WITH BODY MASS INDEX (BMI) OF 28 TO 28.9 IN ADULT: Status: RESOLVED | Noted: 2023-12-07 | Resolved: 2024-06-27

## 2024-06-27 PROCEDURE — 3074F SYST BP LT 130 MM HG: CPT | Mod: CPTII,S$GLB,, | Performed by: FAMILY MEDICINE

## 2024-06-27 PROCEDURE — 4010F ACE/ARB THERAPY RXD/TAKEN: CPT | Mod: CPTII,S$GLB,, | Performed by: FAMILY MEDICINE

## 2024-06-27 PROCEDURE — 1159F MED LIST DOCD IN RCRD: CPT | Mod: CPTII,S$GLB,, | Performed by: FAMILY MEDICINE

## 2024-06-27 PROCEDURE — 3008F BODY MASS INDEX DOCD: CPT | Mod: CPTII,S$GLB,, | Performed by: FAMILY MEDICINE

## 2024-06-27 PROCEDURE — 3079F DIAST BP 80-89 MM HG: CPT | Mod: CPTII,S$GLB,, | Performed by: FAMILY MEDICINE

## 2024-06-27 PROCEDURE — 1160F RVW MEDS BY RX/DR IN RCRD: CPT | Mod: CPTII,S$GLB,, | Performed by: FAMILY MEDICINE

## 2024-06-27 PROCEDURE — 99999 PR PBB SHADOW E&M-EST. PATIENT-LVL IV: CPT | Mod: PBBFAC,,, | Performed by: FAMILY MEDICINE

## 2024-06-27 PROCEDURE — 99214 OFFICE O/P EST MOD 30 MIN: CPT | Mod: S$GLB,,, | Performed by: FAMILY MEDICINE

## 2024-06-27 PROCEDURE — 3044F HG A1C LEVEL LT 7.0%: CPT | Mod: CPTII,S$GLB,, | Performed by: FAMILY MEDICINE

## 2024-06-27 RX ORDER — LOSARTAN POTASSIUM 50 MG/1
50 TABLET ORAL DAILY
Qty: 90 TABLET | Refills: 3 | Status: SHIPPED | OUTPATIENT
Start: 2024-06-27

## 2024-06-27 RX ORDER — LANOLIN ALCOHOL/MO/W.PET/CERES
100 CREAM (GRAM) TOPICAL DAILY
Qty: 90 TABLET | Refills: 3 | Status: SHIPPED | OUTPATIENT
Start: 2024-06-27

## 2024-06-27 RX ORDER — SILDENAFIL 100 MG/1
100 TABLET, FILM COATED ORAL NIGHTLY PRN
Qty: 30 TABLET | Refills: 2 | Status: SHIPPED | OUTPATIENT
Start: 2024-06-27

## 2024-06-27 RX ORDER — FOLIC ACID 1 MG/1
1 TABLET ORAL DAILY
Qty: 90 TABLET | Refills: 3 | Status: SHIPPED | OUTPATIENT
Start: 2024-06-27 | End: 2025-06-27

## 2024-06-27 RX ORDER — ALLOPURINOL 100 MG/1
100 TABLET ORAL DAILY
Qty: 90 TABLET | Refills: 3 | Status: SHIPPED | OUTPATIENT
Start: 2024-06-27

## 2024-06-27 RX ORDER — COLCHICINE 0.6 MG/1
0.6 TABLET ORAL DAILY
Qty: 90 TABLET | Refills: 1 | Status: SHIPPED | OUTPATIENT
Start: 2024-06-27

## 2024-06-27 NOTE — PROGRESS NOTES
Subjective:         Patient ID: Lester Turner is a 58 y.o. male.    Chief Complaint: Hypertension    Patient Active Problem List   Diagnosis    Family history of diabetes mellitus    Psoriasis    Tobacco use disorder    Carpal tunnel syndrome, bilateral    Prediabetes    Primary hypertension    Heavy alcohol use    Mixed hyperlipidemia    Eosinophilia    B12 deficiency    Chronic gout of foot    Hepatic steatosis    Left inguinal hernia      Hypertension      Lester is a 58 y.o. male who presents today for follow up HTN.  Needs refill on colchicine.   Drinks 2-3 beers per day, better.   Taking meds. Smoking 1/2 ppd.     The 10-year ASCVD risk score (Kashmir VÁZQUEZ, et al., 2019) is: 13.7%    Values used to calculate the score:      Age: 58 years      Sex: Male      Is Non- : No      Diabetic: No      Tobacco smoker: Yes      Systolic Blood Pressure: 120 mmHg      Is BP treated: Yes      HDL Cholesterol: 64 mg/dL      Total Cholesterol: 233 mg/dL    Review of Systems   All other systems reviewed and are negative.       Objective:     Vitals:    06/27/24 1317   BP: 120/80   BP Location: Left arm   Patient Position: Sitting   BP Method: Large (Manual)   Pulse: 102   SpO2: 97%   Weight: 78.6 kg (173 lb 4.5 oz)         Physical Exam  Vitals and nursing note reviewed.   Constitutional:       General: He is not in acute distress.     Appearance: Normal appearance. He is not ill-appearing, toxic-appearing or diaphoretic.   HENT:      Head: Normocephalic and atraumatic.   Eyes:      General: No scleral icterus.     Conjunctiva/sclera: Conjunctivae normal.   Cardiovascular:      Rate and Rhythm: Normal rate.      Heart sounds: Normal heart sounds. No murmur heard.  Pulmonary:      Effort: Pulmonary effort is normal. No respiratory distress.      Breath sounds: Normal breath sounds.   Skin:     Coloration: Skin is not pale.   Neurological:      Mental Status: He is alert. Mental status is at baseline.    Psychiatric:         Attention and Perception: Attention and perception normal.         Mood and Affect: Mood and affect normal.         Speech: Speech normal.         Behavior: Behavior normal.         Cognition and Memory: Cognition and memory normal.         Judgment: Judgment normal.       Assessment:       1. Primary hypertension    2. 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 Mixed hyperlipidemia    4. Prediabetes    5. Family history of diabetes mellitus    6. Other eosinophilia    7. B12 deficiency    8. Heavy alcohol use    9. Tobacco use disorder    10. Heavy alcohol use    11. Erectile dysfunction, unspecified erectile dysfunction type    12. Arthralgia of both hands    13. Chronic pain of both feet    14. Hepatic steatosis    15. Left inguinal hernia        Plan:   Recent relevant labs results reviewed with patient.         1. Primary hypertension  -     losartan (COZAAR) 50 MG tablet; Take 1 tablet (50 mg total) by mouth once daily.  Dispense: 90 tablet; Refill: 3  -     Renal Function Panel; Future; Expected date: 06/27/2024  -     Lipid Panel; Future; Expected date: 06/27/2024  -     TSH; Future; Expected date: 06/27/2024  -     Hemoglobin A1C; Future; Expected date: 06/27/2024  -     CBC Auto Differential; Future; Expected date: 06/27/2024  - Chronic health condition is stable and controlled. Continue current medication regimen and relevant lifestyle modifications. Necessary medication refills addressed. Routine ongoing surveillance monitoring.     2. 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 For foot pain  Dispense: 90 tablet; Refill: 1  -     allopurinoL (ZYLOPRIM) 100 MG tablet; Take 1 tablet (100 mg total) by mouth once daily.  Dispense: 90 tablet; Refill: 3    3. Mixed hyperlipidemia  -     Lipid Panel; Future; Expected date:  06/27/2024    4. Prediabetes  5. Family history of diabetes mellitus  -     Hemoglobin A1C; Future; Expected date: 06/27/2024    6. Other eosinophilia  -     CBC Auto Differential; Future; Expected date: 06/27/2024    7. B12 deficiency  -     Vitamin B12; Future; Expected date: 06/27/2024    8. Heavy alcohol use  -     folic acid (FOLVITE) 1 MG tablet; Take 1 tablet (1 mg total) by mouth once daily.  Dispense: 90 tablet; Refill: 3  -     thiamine 100 MG tablet; Take 1 tablet (100 mg total) by mouth once daily.  Dispense: 90 tablet; Refill: 3    9. Tobacco use disorder  Harm reduction. Pre-contemplation.     10. Heavy alcohol use  Comments:  Reduced daily drinking to 1-2 beers  Orders:  -     folic acid (FOLVITE) 1 MG tablet; Take 1 tablet (1 mg total) by mouth once daily.  Dispense: 90 tablet; Refill: 3  -     thiamine 100 MG tablet; Take 1 tablet (100 mg total) by mouth once daily.  Dispense: 90 tablet; Refill: 3        11. Erectile dysfunction, unspecified erectile dysfunction type  -     sildenafiL (VIAGRA) 100 MG tablet; Take 1 tablet (100 mg total) by mouth nightly as needed for Erectile Dysfunction.  Dispense: 30 tablet; Refill: 2    12. Arthralgia of both hands  -     SEYMOUR Screen w/Reflex; Future; Expected date: 06/27/2024  -     Rheumatoid Factor; Future; Expected date: 06/27/2024  -     ANTI-NEUTROPHILIC CYTOPLASMIC ANTIBODY; Future; Expected date: 06/27/2024  -     C-Reactive Protein; Future; Expected date: 06/27/2024  -     Sedimentation rate; Future; Expected date: 06/27/2024  -     X-Ray Hand 3 View Bilateral; Future; Expected date: 06/27/2024    13. Chronic pain of both feet  -     X-Ray Foot Complete 3 view Bilateral; Future; Expected date: 06/27/2024    14. Hepatic steatosis  15. Left inguinal hernia  On imaging risk reduction.     Patient's questions answered. Plan reviewed with patient at the end of visit. Relevant precautions to chief complaint and reasons to seek further medical care or to contact  the office sooner reviewed with patient.     Follow up in about 6 months (around 12/27/2024) for Hypertension Follow-up (prelabs), Annual Exam.

## 2024-07-01 DIAGNOSIS — L40.9 PSORIASIS: ICD-10-CM

## 2024-07-01 RX ORDER — IXEKIZUMAB 80 MG/ML
INJECTION, SOLUTION SUBCUTANEOUS
Qty: 1 ML | Refills: 4 | Status: SHIPPED | OUTPATIENT
Start: 2024-07-01

## 2024-09-09 ENCOUNTER — PATIENT MESSAGE (OUTPATIENT)
Dept: DERMATOLOGY | Facility: CLINIC | Age: 59
End: 2024-09-09
Payer: COMMERCIAL

## 2024-09-09 ENCOUNTER — DOCUMENTATION ONLY (OUTPATIENT)
Dept: DERMATOLOGY | Facility: CLINIC | Age: 59
End: 2024-09-09
Payer: COMMERCIAL

## 2024-12-12 DIAGNOSIS — L40.9 PSORIASIS: ICD-10-CM

## 2024-12-13 RX ORDER — IXEKIZUMAB 80 MG/ML
INJECTION, SOLUTION SUBCUTANEOUS
Qty: 1 ML | Refills: 0 | Status: SHIPPED | OUTPATIENT
Start: 2024-12-13

## 2024-12-16 ENCOUNTER — PATIENT MESSAGE (OUTPATIENT)
Dept: DERMATOLOGY | Facility: CLINIC | Age: 59
End: 2024-12-16
Payer: COMMERCIAL

## 2024-12-16 DIAGNOSIS — Z79.899 ENCOUNTER FOR LONG-TERM (CURRENT) USE OF HIGH-RISK MEDICATION: Primary | ICD-10-CM

## 2024-12-23 ENCOUNTER — LAB VISIT (OUTPATIENT)
Dept: LAB | Facility: HOSPITAL | Age: 59
End: 2024-12-23
Attending: FAMILY MEDICINE
Payer: COMMERCIAL

## 2024-12-23 DIAGNOSIS — E53.8 B12 DEFICIENCY: ICD-10-CM

## 2024-12-23 DIAGNOSIS — D72.19 OTHER EOSINOPHILIA: ICD-10-CM

## 2024-12-23 DIAGNOSIS — M25.542 ARTHRALGIA OF BOTH HANDS: ICD-10-CM

## 2024-12-23 DIAGNOSIS — M25.541 ARTHRALGIA OF BOTH HANDS: ICD-10-CM

## 2024-12-23 DIAGNOSIS — E78.2 MIXED HYPERLIPIDEMIA: ICD-10-CM

## 2024-12-23 DIAGNOSIS — I10 PRIMARY HYPERTENSION: ICD-10-CM

## 2024-12-23 DIAGNOSIS — M1A.4790 OTHER SECONDARY CHRONIC GOUT OF FOOT WITHOUT TOPHUS, UNSPECIFIED LATERALITY: ICD-10-CM

## 2024-12-23 DIAGNOSIS — R73.03 PREDIABETES: ICD-10-CM

## 2024-12-23 LAB
ALBUMIN SERPL BCP-MCNC: 3.8 G/DL (ref 3.5–5.2)
ANION GAP SERPL CALC-SCNC: 9 MMOL/L (ref 8–16)
BASOPHILS # BLD AUTO: 0.03 K/UL (ref 0–0.2)
BASOPHILS NFR BLD: 0.5 % (ref 0–1.9)
BUN SERPL-MCNC: 13 MG/DL (ref 8–23)
CALCIUM SERPL-MCNC: 9.1 MG/DL (ref 8.7–10.5)
CHLORIDE SERPL-SCNC: 108 MMOL/L (ref 95–110)
CHOLEST SERPL-MCNC: 267 MG/DL (ref 120–199)
CHOLEST/HDLC SERPL: 4.3 {RATIO} (ref 2–5)
CO2 SERPL-SCNC: 22 MMOL/L (ref 23–29)
CREAT SERPL-MCNC: 0.8 MG/DL (ref 0.5–1.4)
CRP SERPL-MCNC: 2.5 MG/L (ref 0–8.2)
DIFFERENTIAL METHOD BLD: ABNORMAL
EOSINOPHIL # BLD AUTO: 0.6 K/UL (ref 0–0.5)
EOSINOPHIL NFR BLD: 9 % (ref 0–8)
ERYTHROCYTE [DISTWIDTH] IN BLOOD BY AUTOMATED COUNT: 13.4 % (ref 11.5–14.5)
ERYTHROCYTE [SEDIMENTATION RATE] IN BLOOD BY PHOTOMETRIC METHOD: 18 MM/HR (ref 0–23)
EST. GFR  (NO RACE VARIABLE): >60 ML/MIN/1.73 M^2
ESTIMATED AVG GLUCOSE: 117 MG/DL (ref 68–131)
GLUCOSE SERPL-MCNC: 114 MG/DL (ref 70–110)
HBA1C MFR BLD: 5.7 % (ref 4–5.6)
HCT VFR BLD AUTO: 44.5 % (ref 40–54)
HDLC SERPL-MCNC: 62 MG/DL (ref 40–75)
HDLC SERPL: 23.2 % (ref 20–50)
HGB BLD-MCNC: 15.1 G/DL (ref 14–18)
IMM GRANULOCYTES # BLD AUTO: 0.01 K/UL (ref 0–0.04)
IMM GRANULOCYTES NFR BLD AUTO: 0.2 % (ref 0–0.5)
LDLC SERPL CALC-MCNC: 171.8 MG/DL (ref 63–159)
LYMPHOCYTES # BLD AUTO: 2.8 K/UL (ref 1–4.8)
LYMPHOCYTES NFR BLD: 44.1 % (ref 18–48)
MCH RBC QN AUTO: 34.2 PG (ref 27–31)
MCHC RBC AUTO-ENTMCNC: 33.9 G/DL (ref 32–36)
MCV RBC AUTO: 101 FL (ref 82–98)
MONOCYTES # BLD AUTO: 0.5 K/UL (ref 0.3–1)
MONOCYTES NFR BLD: 7.5 % (ref 4–15)
NEUTROPHILS # BLD AUTO: 2.4 K/UL (ref 1.8–7.7)
NEUTROPHILS NFR BLD: 38.7 % (ref 38–73)
NONHDLC SERPL-MCNC: 205 MG/DL
NRBC BLD-RTO: 0 /100 WBC
PHOSPHATE SERPL-MCNC: 3.2 MG/DL (ref 2.7–4.5)
PLATELET # BLD AUTO: 184 K/UL (ref 150–450)
PMV BLD AUTO: 11.2 FL (ref 9.2–12.9)
POTASSIUM SERPL-SCNC: 4.3 MMOL/L (ref 3.5–5.1)
RBC # BLD AUTO: 4.42 M/UL (ref 4.6–6.2)
RHEUMATOID FACT SERPL-ACNC: <13 IU/ML (ref 0–15)
SODIUM SERPL-SCNC: 139 MMOL/L (ref 136–145)
TRIGL SERPL-MCNC: 166 MG/DL (ref 30–150)
TSH SERPL DL<=0.005 MIU/L-ACNC: 2.37 UIU/ML (ref 0.4–4)
URATE SERPL-MCNC: 7.4 MG/DL (ref 3.4–7)
VIT B12 SERPL-MCNC: 385 PG/ML (ref 210–950)
WBC # BLD AUTO: 6.24 K/UL (ref 3.9–12.7)

## 2024-12-23 PROCEDURE — 84550 ASSAY OF BLOOD/URIC ACID: CPT | Performed by: FAMILY MEDICINE

## 2024-12-23 PROCEDURE — 86038 ANTINUCLEAR ANTIBODIES: CPT | Performed by: FAMILY MEDICINE

## 2024-12-23 PROCEDURE — 85652 RBC SED RATE AUTOMATED: CPT | Performed by: FAMILY MEDICINE

## 2024-12-23 PROCEDURE — 82607 VITAMIN B-12: CPT | Performed by: FAMILY MEDICINE

## 2024-12-23 PROCEDURE — 36415 COLL VENOUS BLD VENIPUNCTURE: CPT | Mod: PO | Performed by: FAMILY MEDICINE

## 2024-12-23 PROCEDURE — 84443 ASSAY THYROID STIM HORMONE: CPT | Performed by: FAMILY MEDICINE

## 2024-12-23 PROCEDURE — 85025 COMPLETE CBC W/AUTO DIFF WBC: CPT | Performed by: FAMILY MEDICINE

## 2024-12-23 PROCEDURE — 80069 RENAL FUNCTION PANEL: CPT | Performed by: FAMILY MEDICINE

## 2024-12-23 PROCEDURE — 86140 C-REACTIVE PROTEIN: CPT | Performed by: FAMILY MEDICINE

## 2024-12-23 PROCEDURE — 86036 ANCA SCREEN EACH ANTIBODY: CPT | Performed by: FAMILY MEDICINE

## 2024-12-23 PROCEDURE — 83036 HEMOGLOBIN GLYCOSYLATED A1C: CPT | Performed by: FAMILY MEDICINE

## 2024-12-23 PROCEDURE — 86431 RHEUMATOID FACTOR QUANT: CPT | Performed by: FAMILY MEDICINE

## 2024-12-23 PROCEDURE — 80061 LIPID PANEL: CPT | Performed by: FAMILY MEDICINE

## 2024-12-24 LAB — ANA SER QL IF: NORMAL

## 2024-12-31 ENCOUNTER — OFFICE VISIT (OUTPATIENT)
Dept: FAMILY MEDICINE | Facility: CLINIC | Age: 59
End: 2024-12-31
Payer: COMMERCIAL

## 2024-12-31 VITALS
BODY MASS INDEX: 28.23 KG/M2 | WEIGHT: 175.69 LBS | OXYGEN SATURATION: 98 % | HEART RATE: 71 BPM | SYSTOLIC BLOOD PRESSURE: 138 MMHG | DIASTOLIC BLOOD PRESSURE: 86 MMHG | HEIGHT: 66 IN

## 2024-12-31 DIAGNOSIS — M19.041 PRIMARY OSTEOARTHRITIS OF BOTH HANDS: ICD-10-CM

## 2024-12-31 DIAGNOSIS — Z12.5 SCREENING FOR PROSTATE CANCER: ICD-10-CM

## 2024-12-31 DIAGNOSIS — M1A.4790 OTHER SECONDARY CHRONIC GOUT OF FOOT WITHOUT TOPHUS, UNSPECIFIED LATERALITY: ICD-10-CM

## 2024-12-31 DIAGNOSIS — R73.03 PREDIABETES: ICD-10-CM

## 2024-12-31 DIAGNOSIS — Z83.3 FAMILY HISTORY OF DIABETES MELLITUS: ICD-10-CM

## 2024-12-31 DIAGNOSIS — Z12.2 SCREENING FOR LUNG CANCER: ICD-10-CM

## 2024-12-31 DIAGNOSIS — D72.19 OTHER EOSINOPHILIA: ICD-10-CM

## 2024-12-31 DIAGNOSIS — F10.90 HEAVY ALCOHOL USE: ICD-10-CM

## 2024-12-31 DIAGNOSIS — K76.0 HEPATIC STEATOSIS: ICD-10-CM

## 2024-12-31 DIAGNOSIS — E53.8 B12 DEFICIENCY: ICD-10-CM

## 2024-12-31 DIAGNOSIS — F17.200 TOBACCO USE DISORDER: ICD-10-CM

## 2024-12-31 DIAGNOSIS — E78.2 MIXED HYPERLIPIDEMIA: ICD-10-CM

## 2024-12-31 DIAGNOSIS — M19.042 PRIMARY OSTEOARTHRITIS OF BOTH HANDS: ICD-10-CM

## 2024-12-31 DIAGNOSIS — Z00.01 ENCOUNTER FOR GENERAL ADULT MEDICAL EXAMINATION WITH ABNORMAL FINDINGS: Primary | ICD-10-CM

## 2024-12-31 DIAGNOSIS — I10 PRIMARY HYPERTENSION: ICD-10-CM

## 2024-12-31 PROCEDURE — 3044F HG A1C LEVEL LT 7.0%: CPT | Mod: CPTII,S$GLB,, | Performed by: FAMILY MEDICINE

## 2024-12-31 PROCEDURE — 1160F RVW MEDS BY RX/DR IN RCRD: CPT | Mod: CPTII,S$GLB,, | Performed by: FAMILY MEDICINE

## 2024-12-31 PROCEDURE — 4010F ACE/ARB THERAPY RXD/TAKEN: CPT | Mod: CPTII,S$GLB,, | Performed by: FAMILY MEDICINE

## 2024-12-31 PROCEDURE — 99999 PR PBB SHADOW E&M-EST. PATIENT-LVL III: CPT | Mod: PBBFAC,,, | Performed by: FAMILY MEDICINE

## 2024-12-31 PROCEDURE — 3008F BODY MASS INDEX DOCD: CPT | Mod: CPTII,S$GLB,, | Performed by: FAMILY MEDICINE

## 2024-12-31 PROCEDURE — 3075F SYST BP GE 130 - 139MM HG: CPT | Mod: CPTII,S$GLB,, | Performed by: FAMILY MEDICINE

## 2024-12-31 PROCEDURE — 1159F MED LIST DOCD IN RCRD: CPT | Mod: CPTII,S$GLB,, | Performed by: FAMILY MEDICINE

## 2024-12-31 PROCEDURE — 3079F DIAST BP 80-89 MM HG: CPT | Mod: CPTII,S$GLB,, | Performed by: FAMILY MEDICINE

## 2024-12-31 PROCEDURE — 99396 PREV VISIT EST AGE 40-64: CPT | Mod: S$GLB,,, | Performed by: FAMILY MEDICINE

## 2024-12-31 RX ORDER — FOLIC ACID 1 MG/1
1 TABLET ORAL DAILY
Qty: 90 TABLET | Refills: 3 | Status: SHIPPED | OUTPATIENT
Start: 2024-12-31 | End: 2025-12-31

## 2024-12-31 RX ORDER — DICLOFENAC SODIUM 10 MG/G
2 GEL TOPICAL 3 TIMES DAILY
Qty: 200 G | Refills: 5 | Status: SHIPPED | OUTPATIENT
Start: 2024-12-31

## 2024-12-31 RX ORDER — LANOLIN ALCOHOL/MO/W.PET/CERES
1000 CREAM (GRAM) TOPICAL DAILY
Qty: 90 TABLET | Refills: 3 | Status: SHIPPED | OUTPATIENT
Start: 2024-12-31

## 2024-12-31 RX ORDER — COLCHICINE 0.6 MG/1
0.6 TABLET ORAL DAILY
Qty: 90 TABLET | Refills: 3 | Status: SHIPPED | OUTPATIENT
Start: 2024-12-31

## 2024-12-31 RX ORDER — LANOLIN ALCOHOL/MO/W.PET/CERES
100 CREAM (GRAM) TOPICAL DAILY
Qty: 90 TABLET | Refills: 3 | Status: SHIPPED | OUTPATIENT
Start: 2024-12-31

## 2024-12-31 RX ORDER — ALLOPURINOL 100 MG/1
100 TABLET ORAL DAILY
Qty: 90 TABLET | Refills: 3 | Status: SHIPPED | OUTPATIENT
Start: 2024-12-31

## 2024-12-31 RX ORDER — ROSUVASTATIN CALCIUM 20 MG/1
20 TABLET, COATED ORAL DAILY
Qty: 90 TABLET | Refills: 3 | Status: SHIPPED | OUTPATIENT
Start: 2024-12-31 | End: 2025-12-31

## 2024-12-31 RX ORDER — LOSARTAN POTASSIUM 50 MG/1
50 TABLET ORAL DAILY
Qty: 90 TABLET | Refills: 3 | Status: SHIPPED | OUTPATIENT
Start: 2024-12-31

## 2024-12-31 NOTE — PROGRESS NOTES
"Subjective:         Patient ID: Lester Turner is a 59 y.o. male.    Chief Complaint: Annual Exam    Patient Active Problem List   Diagnosis    Family history of diabetes mellitus    Psoriasis    Tobacco use disorder    Carpal tunnel syndrome, bilateral    Prediabetes    Primary hypertension    Heavy alcohol use    Mixed hyperlipidemia    Eosinophilia    B12 deficiency    Chronic gout of foot    Hepatic steatosis    Left inguinal hernia      HPI    Lester is a 59 y.o. male    Here for annual exam.   Reports that after work he reports exhausted and fatigue, feeling tired. Did have one time with possible sweats and hypoglycemia.    Reports that the fatigue has been some weeks.  Out of blood pressure meds x 3-4 weeks.   Elevated blood pressure today - was normal when on medications.     Objective:     Vitals:    12/31/24 0733   BP: 138/86   BP Location: Left arm   Patient Position: Sitting   Pulse: 71   SpO2: 98%   Weight: 79.7 kg (175 lb 11.3 oz)   Height: 5' 6" (1.676 m)         Physical Exam  Vitals and nursing note reviewed.   Constitutional:       General: He is not in acute distress.     Appearance: Normal appearance. He is not ill-appearing, toxic-appearing or diaphoretic.   HENT:      Head: Normocephalic and atraumatic.   Eyes:      General: No scleral icterus.     Conjunctiva/sclera: Conjunctivae normal.   Cardiovascular:      Rate and Rhythm: Normal rate.      Heart sounds: Normal heart sounds. No murmur heard.  Pulmonary:      Effort: Pulmonary effort is normal. No respiratory distress.      Breath sounds: Normal breath sounds.   Skin:     Coloration: Skin is not pale.   Neurological:      Mental Status: He is alert. Mental status is at baseline.   Psychiatric:         Attention and Perception: Attention and perception normal.         Mood and Affect: Mood and affect normal.         Speech: Speech normal.         Behavior: Behavior normal.         Cognition and Memory: Cognition and memory normal.         Judgment: " Judgment normal.       The 10-year ASCVD risk score (Kashmir VÁZQUEZ, et al., 2019) is: 20.7%    Values used to calculate the score:      Age: 59 years      Sex: Male      Is Non- : No      Diabetic: No      Tobacco smoker: Yes      Systolic Blood Pressure: 138 mmHg      Is BP treated: Yes      HDL Cholesterol: 62 mg/dL      Total Cholesterol: 267 mg/dL    Assessment:       1. Encounter for general adult medical examination with abnormal findings    2. Primary hypertension    3. Mixed hyperlipidemia    4. Prediabetes    5. Family history of diabetes mellitus    6. Other eosinophilia    7. B12 deficiency    8. Heavy alcohol use    9. Hepatic steatosis    10. Screening for prostate cancer    11. Tobacco use disorder    12. Screening for lung cancer    13. Heavy alcohol use    14. 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 Primary osteoarthritis of both hands          Plan:   Recent relevant labs results reviewed with patient.         1. Encounter for general adult medical examination with abnormal findings  - Risk and age appropriate anticipatory guidance. HM reviewed and updated. Recommendations discussed with patient as appropriate.     2. Primary hypertension  -     Hepatic Function Panel; Future; Expected date: 12/31/2024  -     Renal Function Panel; Future; Expected date: 12/31/2024  -     Lipid Panel; Future; Expected date: 12/31/2024  -     Hemoglobin A1C; Future; Expected date: 12/31/2024  -     CBC Auto Differential; Future; Expected date: 12/31/2024  -     losartan (COZAAR) 50 MG tablet; Take 1 tablet (50 mg total) by mouth once daily.  Dispense: 90 tablet; Refill: 3  Take meds consistently. Likely improved when on meds. Prior values controlled    3. Mixed hyperlipidemia  -     Lipid Panel; Future; Expected date: 12/31/2024  Add statin    4. Prediabetes  -     Hemoglobin A1C; Future; Expected date: 12/31/2024  Stable. Lifestyle modifications.     5.  Family history of diabetes mellitus  -     Hemoglobin A1C; Future; Expected date: 12/31/2024    6. Other eosinophilia  Stable, work up unremarkable.     7. B12 deficiency  -     Vitamin B12; Future; Expected date: 12/31/2024  -     cyanocobalamin (VITAMIN B-12) 1000 MCG tablet; Take 1 tablet (1,000 mcg total) by mouth once daily.  Dispense: 90 tablet; Refill: 3    8. Heavy alcohol use  -     folic acid (FOLVITE) 1 MG tablet; Take 1 tablet (1 mg total) by mouth once daily.  Dispense: 90 tablet; Refill: 3  -     thiamine 100 MG tablet; Take 1 tablet (100 mg total) by mouth once daily.  Dispense: 90 tablet; Refill: 3    9. Hepatic steatosis  Lifestyle modifications  S/p CT abd/pelvis    10. Screening for prostate cancer  -     PSA, Screening; Future; Expected date: 12/31/2024    11. Tobacco use disorder  -     CT Chest Lung Screening Low Dose; Future; Expected date: 12/31/2024    12. Screening for lung cancer  -     CT Chest Lung Screening Low Dose; Future; Expected date: 12/31/2024    13. Heavy alcohol use  Comments:  Reduced daily drinking to 1-2 beers. Still drinks daily.   Orders:  -     folic acid (FOLVITE) 1 MG tablet; Take 1 tablet (1 mg total) by mouth once daily.  Dispense: 90 tablet; Refill: 3  -     thiamine 100 MG tablet; Take 1 tablet (100 mg total) by mouth once daily.  Dispense: 90 tablet; Refill: 3    14. 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  Dispense: 90 tablet; Refill: 3  -     colchicine (COLCRYS) 0.6 mg tablet; Take 1 tablet (0.6 mg total) by mouth once daily. For foot pain  Dispense: 90 tablet; Refill: 3  -     URIC ACID; Future; Expected date: 12/31/2024    15. Primary osteoarthritis of both hands  RA screening neg.   OTC med management. Careful with EtOH and acetaminophen/NSAIDs.  Xrays, Voltaren gel    Patient's questions answered. Plan reviewed with patient at  the end of visit. Relevant precautions to chief complaint and reasons to seek further medical care or to contact the office sooner reviewed with patient.     Follow up in about 6 months (around 6/30/2025) for Annual Exam, (prelabs).        Part of this note was dictated using voice recognition software. Please excuse any typographical errors.     This note was generated with the assistance of ambient listening technology. Verbal consent was obtained by the patient and accompanying visitor(s) for the recording of patient appointment to facilitate this note. I attest to having reviewed and edited the generated note for accuracy, though some syntax or spelling errors may persist. Please contact the author of this note for any clarification.

## 2025-01-06 ENCOUNTER — HOSPITAL ENCOUNTER (OUTPATIENT)
Dept: RADIOLOGY | Facility: HOSPITAL | Age: 60
Discharge: HOME | End: 2025-01-06
Attending: FAMILY MEDICINE
Payer: COMMERCIAL

## 2025-01-06 DIAGNOSIS — M25.541 ARTHRALGIA OF BOTH HANDS: ICD-10-CM

## 2025-01-06 DIAGNOSIS — M25.542 ARTHRALGIA OF BOTH HANDS: ICD-10-CM

## 2025-01-06 PROCEDURE — 73130 X-RAY EXAM OF HAND: CPT | Mod: 26,,, | Performed by: STUDENT IN AN ORGANIZED HEALTH CARE EDUCATION/TRAINING PROGRAM

## 2025-01-06 PROCEDURE — 73130 X-RAY EXAM OF HAND: CPT | Mod: TC,50,FY

## 2025-01-09 DIAGNOSIS — L40.9 PSORIASIS: ICD-10-CM

## 2025-01-09 RX ORDER — IXEKIZUMAB 80 MG/ML
INJECTION, SOLUTION SUBCUTANEOUS
Qty: 1 ML | Refills: 1 | Status: SHIPPED | OUTPATIENT
Start: 2025-01-09

## 2025-01-11 ENCOUNTER — CLINICAL SUPPORT (OUTPATIENT)
Dept: OTHER | Facility: CLINIC | Age: 60
End: 2025-01-11

## 2025-01-11 DIAGNOSIS — Z00.8 ENCOUNTER FOR OTHER GENERAL EXAMINATION: ICD-10-CM

## 2025-01-14 ENCOUNTER — PATIENT MESSAGE (OUTPATIENT)
Dept: DERMATOLOGY | Facility: CLINIC | Age: 60
End: 2025-01-14
Payer: COMMERCIAL

## 2025-01-14 LAB
HDLC SERPL-MCNC: 56 MG/DL
POC CHOLESTEROL, LDL (DOCK): 88 MG/DL
POC CHOLESTEROL, TOTAL: 162 MG/DL
POC GLUCOSE, FASTING: 102 MG/DL (ref 60–110)
TRIGL SERPL-MCNC: 98 MG/DL

## 2025-01-28 ENCOUNTER — HOSPITAL ENCOUNTER (OUTPATIENT)
Dept: RADIOLOGY | Facility: HOSPITAL | Age: 60
Discharge: HOME | End: 2025-01-28
Attending: FAMILY MEDICINE
Payer: COMMERCIAL

## 2025-01-28 DIAGNOSIS — Z12.2 SCREENING FOR LUNG CANCER: ICD-10-CM

## 2025-01-28 DIAGNOSIS — F17.200 TOBACCO USE DISORDER: ICD-10-CM

## 2025-01-28 PROCEDURE — 71271 CT THORAX LUNG CANCER SCR C-: CPT | Mod: TC

## 2025-01-28 PROCEDURE — 71271 CT THORAX LUNG CANCER SCR C-: CPT | Mod: 26,,, | Performed by: RADIOLOGY

## 2025-02-04 ENCOUNTER — E-CONSULT (OUTPATIENT)
Dept: RHEUMATOLOGY | Facility: CLINIC | Age: 60
End: 2025-02-04
Payer: COMMERCIAL

## 2025-02-04 DIAGNOSIS — M1A.0790 CHRONIC GOUT OF FOOT, UNSPECIFIED CAUSE, UNSPECIFIED LATERALITY: ICD-10-CM

## 2025-02-04 DIAGNOSIS — M25.80 PERIARTICULAR CALCIFICATION: Primary | ICD-10-CM

## 2025-02-04 DIAGNOSIS — M1A.09X0 IDIOPATHIC CHRONIC GOUT OF MULTIPLE SITES WITHOUT TOPHUS: Primary | ICD-10-CM

## 2025-02-04 PROCEDURE — 99451 NTRPROF PH1/NTRNET/EHR 5/>: CPT | Mod: S$GLB,,, | Performed by: INTERNAL MEDICINE

## 2025-03-03 DIAGNOSIS — L40.9 PSORIASIS: ICD-10-CM

## 2025-03-05 ENCOUNTER — PATIENT MESSAGE (OUTPATIENT)
Dept: DERMATOLOGY | Facility: CLINIC | Age: 60
End: 2025-03-05
Payer: COMMERCIAL

## 2025-03-05 RX ORDER — IXEKIZUMAB 80 MG/ML
INJECTION, SOLUTION SUBCUTANEOUS
Qty: 1 ML | Refills: 12 | OUTPATIENT
Start: 2025-03-05

## 2025-03-10 ENCOUNTER — PATIENT MESSAGE (OUTPATIENT)
Dept: FAMILY MEDICINE | Facility: CLINIC | Age: 60
End: 2025-03-10
Payer: COMMERCIAL

## 2025-06-27 ENCOUNTER — LAB VISIT (OUTPATIENT)
Dept: LAB | Facility: HOSPITAL | Age: 60
End: 2025-06-27
Attending: FAMILY MEDICINE
Payer: COMMERCIAL

## 2025-06-27 DIAGNOSIS — M25.80 PERIARTICULAR CALCIFICATION: ICD-10-CM

## 2025-06-27 DIAGNOSIS — Z79.899 ENCOUNTER FOR LONG-TERM (CURRENT) USE OF HIGH-RISK MEDICATION: ICD-10-CM

## 2025-06-27 DIAGNOSIS — R73.03 PREDIABETES: ICD-10-CM

## 2025-06-27 DIAGNOSIS — E53.8 B12 DEFICIENCY: ICD-10-CM

## 2025-06-27 DIAGNOSIS — E78.2 MIXED HYPERLIPIDEMIA: ICD-10-CM

## 2025-06-27 DIAGNOSIS — M1A.4790 OTHER SECONDARY CHRONIC GOUT OF FOOT WITHOUT TOPHUS, UNSPECIFIED LATERALITY: ICD-10-CM

## 2025-06-27 DIAGNOSIS — Z83.3 FAMILY HISTORY OF DIABETES MELLITUS: ICD-10-CM

## 2025-06-27 DIAGNOSIS — Z12.5 SCREENING FOR PROSTATE CANCER: ICD-10-CM

## 2025-06-27 DIAGNOSIS — I10 PRIMARY HYPERTENSION: ICD-10-CM

## 2025-06-27 LAB
25(OH)D3+25(OH)D2 SERPL-MCNC: 23 NG/ML (ref 30–96)
ABSOLUTE EOSINOPHIL (OHS): 0.49 K/UL
ABSOLUTE MONOCYTE (OHS): 0.49 K/UL (ref 0.3–1)
ABSOLUTE NEUTROPHIL COUNT (OHS): 2.09 K/UL (ref 1.8–7.7)
ALBUMIN SERPL BCP-MCNC: 4.3 G/DL (ref 3.5–5.2)
ALP SERPL-CCNC: 56 UNIT/L (ref 40–150)
ALT SERPL W/O P-5'-P-CCNC: 38 UNIT/L (ref 10–44)
ANION GAP (OHS): 7 MMOL/L (ref 8–16)
AST SERPL-CCNC: 31 UNIT/L (ref 11–45)
BASOPHILS # BLD AUTO: 0.06 K/UL
BASOPHILS NFR BLD AUTO: 1 %
BILIRUB DIRECT SERPL-MCNC: 0.2 MG/DL (ref 0.1–0.3)
BILIRUB SERPL-MCNC: 0.5 MG/DL (ref 0.1–1)
BUN SERPL-MCNC: 15 MG/DL (ref 8–23)
CALCIUM SERPL-MCNC: 9 MG/DL (ref 8.7–10.5)
CHLORIDE SERPL-SCNC: 106 MMOL/L (ref 95–110)
CHOLEST SERPL-MCNC: 131 MG/DL (ref 120–199)
CHOLEST/HDLC SERPL: 2.1 {RATIO} (ref 2–5)
CO2 SERPL-SCNC: 25 MMOL/L (ref 23–29)
CREAT SERPL-MCNC: 0.9 MG/DL (ref 0.5–1.4)
EAG (OHS): 126 MG/DL (ref 68–131)
ERYTHROCYTE [DISTWIDTH] IN BLOOD BY AUTOMATED COUNT: 13.6 % (ref 11.5–14.5)
GFR SERPLBLD CREATININE-BSD FMLA CKD-EPI: >60 ML/MIN/1.73/M2
GLUCOSE SERPL-MCNC: 106 MG/DL (ref 70–110)
HBA1C MFR BLD: 6 % (ref 4–5.6)
HCT VFR BLD AUTO: 42.6 % (ref 40–54)
HDLC SERPL-MCNC: 61 MG/DL (ref 40–75)
HDLC SERPL: 46.6 % (ref 20–50)
HGB BLD-MCNC: 14.3 GM/DL (ref 14–18)
IMM GRANULOCYTES # BLD AUTO: 0.01 K/UL (ref 0–0.04)
IMM GRANULOCYTES NFR BLD AUTO: 0.2 % (ref 0–0.5)
LDLC SERPL CALC-MCNC: 51 MG/DL (ref 63–159)
LYMPHOCYTES # BLD AUTO: 2.9 K/UL (ref 1–4.8)
MCH RBC QN AUTO: 34 PG (ref 27–31)
MCHC RBC AUTO-ENTMCNC: 33.6 G/DL (ref 32–36)
MCV RBC AUTO: 101 FL (ref 82–98)
NONHDLC SERPL-MCNC: 70 MG/DL
NUCLEATED RBC (/100WBC) (OHS): 0 /100 WBC
PHOSPHATE SERPL-MCNC: 3.7 MG/DL (ref 2.7–4.5)
PLATELET # BLD AUTO: 188 K/UL (ref 150–450)
PMV BLD AUTO: 11.5 FL (ref 9.2–12.9)
POTASSIUM SERPL-SCNC: 4.6 MMOL/L (ref 3.5–5.1)
PROT SERPL-MCNC: 7.1 GM/DL (ref 6–8.4)
PSA SERPL-MCNC: 0.73 NG/ML
PTH-INTACT SERPL-MCNC: 42.8 PG/ML (ref 9–77)
RBC # BLD AUTO: 4.2 M/UL (ref 4.6–6.2)
RELATIVE EOSINOPHIL (OHS): 8.1 %
RELATIVE LYMPHOCYTE (OHS): 48 % (ref 18–48)
RELATIVE MONOCYTE (OHS): 8.1 % (ref 4–15)
RELATIVE NEUTROPHIL (OHS): 34.6 % (ref 38–73)
SODIUM SERPL-SCNC: 138 MMOL/L (ref 136–145)
TRIGL SERPL-MCNC: 95 MG/DL (ref 30–150)
URATE SERPL-MCNC: 8.1 MG/DL (ref 3.4–7)
VIT B12 SERPL-MCNC: 774 PG/ML (ref 210–950)
WBC # BLD AUTO: 6.04 K/UL (ref 3.9–12.7)

## 2025-06-27 PROCEDURE — 85025 COMPLETE CBC W/AUTO DIFF WBC: CPT

## 2025-06-27 PROCEDURE — 82607 VITAMIN B-12: CPT

## 2025-06-27 PROCEDURE — 84153 ASSAY OF PSA TOTAL: CPT

## 2025-06-27 PROCEDURE — 84550 ASSAY OF BLOOD/URIC ACID: CPT

## 2025-06-27 PROCEDURE — 82465 ASSAY BLD/SERUM CHOLESTEROL: CPT

## 2025-06-27 PROCEDURE — 80053 COMPREHEN METABOLIC PANEL: CPT

## 2025-06-27 PROCEDURE — 83036 HEMOGLOBIN GLYCOSYLATED A1C: CPT

## 2025-06-27 PROCEDURE — 82306 VITAMIN D 25 HYDROXY: CPT

## 2025-06-27 PROCEDURE — 84100 ASSAY OF PHOSPHORUS: CPT

## 2025-06-27 PROCEDURE — 83970 ASSAY OF PARATHORMONE: CPT

## 2025-06-27 PROCEDURE — 82248 BILIRUBIN DIRECT: CPT

## 2025-06-27 PROCEDURE — 36415 COLL VENOUS BLD VENIPUNCTURE: CPT | Mod: PO

## 2025-07-15 ENCOUNTER — OFFICE VISIT (OUTPATIENT)
Dept: FAMILY MEDICINE | Facility: CLINIC | Age: 60
End: 2025-07-15
Payer: COMMERCIAL

## 2025-07-15 VITALS
WEIGHT: 176.38 LBS | DIASTOLIC BLOOD PRESSURE: 82 MMHG | HEIGHT: 67 IN | OXYGEN SATURATION: 97 % | SYSTOLIC BLOOD PRESSURE: 132 MMHG | BODY MASS INDEX: 27.68 KG/M2 | HEART RATE: 64 BPM

## 2025-07-15 DIAGNOSIS — R73.03 PREDIABETES: ICD-10-CM

## 2025-07-15 DIAGNOSIS — K76.0 HEPATIC STEATOSIS: ICD-10-CM

## 2025-07-15 DIAGNOSIS — F17.200 TOBACCO USE DISORDER: ICD-10-CM

## 2025-07-15 DIAGNOSIS — F10.90 HEAVY ALCOHOL USE: ICD-10-CM

## 2025-07-15 DIAGNOSIS — N52.9 ERECTILE DYSFUNCTION, UNSPECIFIED ERECTILE DYSFUNCTION TYPE: ICD-10-CM

## 2025-07-15 DIAGNOSIS — Z83.3 FAMILY HISTORY OF DIABETES MELLITUS: ICD-10-CM

## 2025-07-15 DIAGNOSIS — E55.9 VITAMIN D DEFICIENCY: ICD-10-CM

## 2025-07-15 DIAGNOSIS — M1A.4790 OTHER SECONDARY CHRONIC GOUT OF FOOT WITHOUT TOPHUS, UNSPECIFIED LATERALITY: ICD-10-CM

## 2025-07-15 DIAGNOSIS — Z86.0100 HISTORY OF COLON POLYPS: ICD-10-CM

## 2025-07-15 DIAGNOSIS — Z12.11 COLON CANCER SCREENING: ICD-10-CM

## 2025-07-15 DIAGNOSIS — L40.9 PSORIASIS: ICD-10-CM

## 2025-07-15 DIAGNOSIS — M79.643 CHRONIC HAND PAIN, UNSPECIFIED LATERALITY: ICD-10-CM

## 2025-07-15 DIAGNOSIS — H53.8 BLURRY VISION, BILATERAL: ICD-10-CM

## 2025-07-15 DIAGNOSIS — E78.2 MIXED HYPERLIPIDEMIA: ICD-10-CM

## 2025-07-15 DIAGNOSIS — E53.8 B12 DEFICIENCY: ICD-10-CM

## 2025-07-15 DIAGNOSIS — I10 PRIMARY HYPERTENSION: Primary | ICD-10-CM

## 2025-07-15 DIAGNOSIS — G89.29 CHRONIC HAND PAIN, UNSPECIFIED LATERALITY: ICD-10-CM

## 2025-07-15 PROCEDURE — 99999 PR PBB SHADOW E&M-EST. PATIENT-LVL V: CPT | Mod: PBBFAC,,, | Performed by: FAMILY MEDICINE

## 2025-07-15 RX ORDER — LANOLIN ALCOHOL/MO/W.PET/CERES
1000 CREAM (GRAM) TOPICAL DAILY
Qty: 90 TABLET | Refills: 3 | Status: SHIPPED | OUTPATIENT
Start: 2025-07-15

## 2025-07-15 RX ORDER — LANOLIN ALCOHOL/MO/W.PET/CERES
100 CREAM (GRAM) TOPICAL DAILY
Qty: 90 TABLET | Refills: 3 | Status: SHIPPED | OUTPATIENT
Start: 2025-07-15

## 2025-07-15 RX ORDER — COLCHICINE 0.6 MG/1
0.6 TABLET ORAL DAILY
Qty: 90 TABLET | Refills: 3 | Status: SHIPPED | OUTPATIENT
Start: 2025-07-15

## 2025-07-15 RX ORDER — ALLOPURINOL 100 MG/1
200 TABLET ORAL DAILY
Qty: 180 TABLET | Refills: 3 | Status: SHIPPED | OUTPATIENT
Start: 2025-07-15

## 2025-07-15 RX ORDER — SILDENAFIL 100 MG/1
100 TABLET, FILM COATED ORAL NIGHTLY PRN
Qty: 30 TABLET | Refills: 5 | Status: SHIPPED | OUTPATIENT
Start: 2025-07-15

## 2025-07-15 RX ORDER — FOLIC ACID 1 MG/1
1 TABLET ORAL DAILY
Qty: 90 TABLET | Refills: 3 | Status: SHIPPED | OUTPATIENT
Start: 2025-07-15 | End: 2026-07-15

## 2025-07-15 RX ORDER — ROSUVASTATIN CALCIUM 20 MG/1
20 TABLET, COATED ORAL DAILY
Qty: 90 TABLET | Refills: 3 | Status: SHIPPED | OUTPATIENT
Start: 2025-07-15 | End: 2026-07-15

## 2025-07-15 RX ORDER — LOSARTAN POTASSIUM 50 MG/1
50 TABLET ORAL DAILY
Qty: 90 TABLET | Refills: 3 | Status: SHIPPED | OUTPATIENT
Start: 2025-07-15

## 2025-07-15 RX ORDER — SILDENAFIL 100 MG/1
100 TABLET, FILM COATED ORAL NIGHTLY PRN
Qty: 30 TABLET | Refills: 2 | Status: SHIPPED | OUTPATIENT
Start: 2025-07-15 | End: 2025-07-15 | Stop reason: SDUPTHER

## 2025-07-15 NOTE — Clinical Note
Please schedule dermatology. I messaged his dermatologist that he was seeing. Tell him, she wants to see him in office since it has been a while to continue refill of his meds.

## 2025-07-15 NOTE — PROGRESS NOTES
"Subjective:         Patient ID: Lester Turner is a 59 y.o. male.    Chief Complaint: Hypertension    Patient Active Problem List   Diagnosis    Family history of diabetes mellitus    Psoriasis    Tobacco use disorder    Carpal tunnel syndrome, bilateral    Prediabetes    Primary hypertension    Heavy alcohol use    Mixed hyperlipidemia    Eosinophilia    B12 deficiency    Chronic gout of foot    Hepatic steatosis    Left inguinal hernia      HPI    Lester is a 59 y.o. male    History of Present Illness          Here for follow up of chronic medical problems including HTN.   Overall stable.   No significant improvement in his alcohol habits. Continues to use beer as hydration and relaxation aid at the end of the day, but declines eye opener, physical withdrawal symptoms. Reviewed meds and labs. Continued harm reduction discussion with his lifestyle modifications.        Objective:     Vitals:    07/15/25 1034   BP: 132/82   BP Location: Left arm   Patient Position: Sitting   Pulse: 64   SpO2: 97%   Weight: 80 kg (176 lb 5.9 oz)   Height: 5' 6.5" (1.689 m)         Physical Exam  Vitals and nursing note reviewed.   Constitutional:       General: He is not in acute distress.     Appearance: Normal appearance. He is not ill-appearing, toxic-appearing or diaphoretic.   HENT:      Head: Normocephalic and atraumatic.   Eyes:      General: No scleral icterus.     Conjunctiva/sclera: Conjunctivae normal.   Cardiovascular:      Rate and Rhythm: Normal rate.      Heart sounds: Normal heart sounds. No murmur heard.  Pulmonary:      Effort: Pulmonary effort is normal. No respiratory distress.   Skin:     Coloration: Skin is not pale.   Neurological:      Mental Status: He is alert. Mental status is at baseline.   Psychiatric:         Attention and Perception: Attention and perception normal.         Mood and Affect: Mood and affect normal.         Speech: Speech normal.         Behavior: Behavior normal.         Cognition and Memory: " Cognition and memory normal.         Judgment: Judgment normal.       The 10-year ASCVD risk score (Kashmir VÁZQUEZ, et al., 2019) is: 11.3%    Values used to calculate the score:      Age: 59 years      Sex: Male      Is Non- : No      Diabetic: No      Tobacco smoker: Yes      Systolic Blood Pressure: 136 mmHg      Is BP treated: Yes      HDL Cholesterol: 61 mg/dL      Total Cholesterol: 131 mg/dL    FIB-4 Calculation: 1.63 at 6/27/2025  7:07 AM  Calculated from:  SGOT/AST: 31 unit/L at 6/27/2025  7:07 AM  SGPT/ALT: 38 unit/L at 6/27/2025  7:07 AM  Platelets: 188 K/uL at 6/27/2025  7:07 AM  Age: 59 years    FIB-4 below 1.30 is considered as low-risk for advanced fibrosis  FIB-4 over 2.67 is considered as high-risk for advanced fibrosis  FIB-4 values between 1.30 and 2.67 are considered as intermediate-risk of advanced fibrosis for ages 36-64.     For ages > 64 the cut-off for low-risk goes to < 2.  This is a screening tool and clinical judgement should be used in the interpretation of these results.    Assessment:       1. Primary hypertension    2. Mixed hyperlipidemia    3. Prediabetes    4. Family history of diabetes mellitus    5. B12 deficiency    6. Hepatic steatosis    7. Tobacco use disorder    8. Heavy alcohol use    9. History of colon polyps    10. Colon cancer screening    11. Vitamin D deficiency    12. 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 Heavy alcohol use    14. Psoriasis    15. Erectile dysfunction, unspecified erectile dysfunction type    16. Blurry vision, bilateral    17. Chronic hand pain, unspecified laterality          Plan:   Recent relevant labs results reviewed with patient.         Assessment & Plan              1. Primary hypertension  -     losartan (COZAAR) 50 MG tablet; Take 1 tablet (50 mg total) by mouth once daily. (Patient not taking: Reported on 7/22/2025)  Dispense: 90 tablet; Refill: 3  - Chronic health condition  is stable and controlled. Continue current medication regimen and relevant lifestyle modifications. Necessary medication refills addressed. Routine ongoing surveillance monitoring.     2. Mixed hyperlipidemia  -     rosuvastatin (CRESTOR) 20 MG tablet; Take 1 tablet (20 mg total) by mouth once daily. For cholesterol (Patient not taking: Reported on 7/22/2025)  Dispense: 90 tablet; Refill: 3  -     Comprehensive Metabolic Panel; Future; Expected date: 07/15/2025  -     CBC Auto Differential; Future; Expected date: 07/15/2025  -     Hemoglobin A1C; Future; Expected date: 07/15/2025  -     TSH; Future; Expected date: 07/15/2025  - Chronic health condition is stable and controlled. Continue current medication regimen and relevant lifestyle modifications. Necessary medication refills addressed. Routine ongoing surveillance monitoring.   Improved with med    3. Prediabetes  -     Hemoglobin A1C; Future; Expected date: 07/15/2025  Worsened    4. Family history of diabetes mellitus  Weight management. Discussed medication med now vs aggressive lifestyle modifications    5. B12 deficiency  -     cyanocobalamin (VITAMIN B-12) 1000 MCG tablet; Take 1 tablet (1,000 mcg total) by mouth once daily. (Patient not taking: Reported on 7/22/2025)  Dispense: 90 tablet; Refill: 3    6. Hepatic steatosis  -     Ambulatory referral/consult to Hepatology; Future; Expected date: 07/22/2025  FIB4 score high  Alcohol cessation encouraged    7. Tobacco use disorder  8. Heavy alcohol use  -     folic acid (FOLVITE) 1 MG tablet; Take 1 tablet (1 mg total) by mouth once daily. (Patient not taking: Reported on 7/22/2025)  Dispense: 90 tablet; Refill: 3  -     thiamine 100 MG tablet; Take 1 tablet (100 mg total) by mouth once daily. (Patient not taking: Reported on 7/22/2025)  Dispense: 90 tablet; Refill: 3  Harm reduction  Pre-contemplative    9. History of colon polyps  10. Colon cancer screening  -     Ambulatory referral/consult to Endo  Procedure ; Future; Expected date: 07/16/2025    11. Vitamin D deficiency  Over-the-counter supplement    12. 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 (Patient not taking: Reported on 7/22/2025)  Dispense: 180 tablet; Refill: 3  -     colchicine (COLCRYS) 0.6 mg tablet; Take 1 tablet (0.6 mg total) by mouth once daily. For foot pain (Patient not taking: Reported on 7/22/2025)  Dispense: 90 tablet; Refill: 3  He stopped taking meds consistently - recheck levels, take meds and recheck levels.    13. Heavy alcohol use  Comments:  Reduced daily drinking to 1-2 beers but continues to drink  Orders:  -     folic acid (FOLVITE) 1 MG tablet; Take 1 tablet (1 mg total) by mouth once daily. (Patient not taking: Reported on 7/22/2025)  Dispense: 90 tablet; Refill: 3  -     thiamine 100 MG tablet; Take 1 tablet (100 mg total) by mouth once daily. (Patient not taking: Reported on 7/22/2025)  Dispense: 90 tablet; Refill: 3    14. Psoriasis  Comments:  Re-establish  Taltz    15. Erectile dysfunction, unspecified erectile dysfunction type  -     Discontinue: sildenafiL (VIAGRA) 100 MG tablet; Take 1 tablet (100 mg total) by mouth nightly as needed for Erectile Dysfunction.  Dispense: 30 tablet; Refill: 2  -     sildenafiL (VIAGRA) 100 MG tablet; Take 1 tablet (100 mg total) by mouth nightly as needed for Erectile Dysfunction. (Patient not taking: Reported on 7/22/2025)  Dispense: 30 tablet; Refill: 5    16. Blurry vision, bilateral  -     Ambulatory referral/consult to Optometry; Future; Expected date: 07/22/2025    17. Chronic hand pain, unspecified laterality  -     Ambulatory referral/consult to Rheumatology; Future; Expected date: 07/22/2025      Patient's questions answered. Plan reviewed with patient at the end of visit. Relevant precautions to chief complaint and reasons to seek further  medical care or to contact the office sooner reviewed with patient.     Follow up in about 6 months (around 1/15/2026) for Annual Exam, (prelabs).        Part of this note was dictated using voice recognition software. Please excuse any typographical errors.     This note was generated with the assistance of ambient listening technology. Verbal consent was obtained by the patient and accompanying visitor(s) for the recording of patient appointment to facilitate this note. I attest to having reviewed and edited the generated note for accuracy, though some syntax or spelling errors may persist. Please contact the author of this note for any clarification.

## 2025-07-15 NOTE — Clinical Note
Good morning!  Thank you for seeing Mr. Lester Turner. I saw him today, and he hasn't received his Taltz for the past 2-3 months. He states that you have been refilling, but that the mail order pharmacy says there is a hold up. Would you mind seeing if there's a PA needed or some other issue? Thank you for your time on the matter!

## 2025-07-16 ENCOUNTER — PATIENT MESSAGE (OUTPATIENT)
Dept: DERMATOLOGY | Facility: CLINIC | Age: 60
End: 2025-07-16
Payer: COMMERCIAL

## 2025-07-22 ENCOUNTER — OFFICE VISIT (OUTPATIENT)
Dept: RHEUMATOLOGY | Facility: CLINIC | Age: 60
End: 2025-07-22
Payer: COMMERCIAL

## 2025-07-22 VITALS
DIASTOLIC BLOOD PRESSURE: 82 MMHG | HEART RATE: 85 BPM | WEIGHT: 177.94 LBS | BODY MASS INDEX: 28.29 KG/M2 | SYSTOLIC BLOOD PRESSURE: 136 MMHG

## 2025-07-22 DIAGNOSIS — G89.29 CHRONIC HAND PAIN, UNSPECIFIED LATERALITY: ICD-10-CM

## 2025-07-22 DIAGNOSIS — M79.643 CHRONIC HAND PAIN, UNSPECIFIED LATERALITY: ICD-10-CM

## 2025-07-22 DIAGNOSIS — M79.2 PERIPHERAL NEURALGIA: Primary | ICD-10-CM

## 2025-07-22 DIAGNOSIS — M19.049 LOCALIZED, PRIMARY OSTEOARTHRITIS OF HAND, UNSPECIFIED LATERALITY: ICD-10-CM

## 2025-07-22 DIAGNOSIS — F10.90 HEAVY ALCOHOL USE: ICD-10-CM

## 2025-07-22 PROCEDURE — 4010F ACE/ARB THERAPY RXD/TAKEN: CPT | Mod: CPTII,S$GLB,, | Performed by: INTERNAL MEDICINE

## 2025-07-22 PROCEDURE — 99999 PR PBB SHADOW E&M-EST. PATIENT-LVL IV: CPT | Mod: PBBFAC,,, | Performed by: INTERNAL MEDICINE

## 2025-07-22 PROCEDURE — 3075F SYST BP GE 130 - 139MM HG: CPT | Mod: CPTII,S$GLB,, | Performed by: INTERNAL MEDICINE

## 2025-07-22 PROCEDURE — 3008F BODY MASS INDEX DOCD: CPT | Mod: CPTII,S$GLB,, | Performed by: INTERNAL MEDICINE

## 2025-07-22 PROCEDURE — 1160F RVW MEDS BY RX/DR IN RCRD: CPT | Mod: CPTII,S$GLB,, | Performed by: INTERNAL MEDICINE

## 2025-07-22 PROCEDURE — 99204 OFFICE O/P NEW MOD 45 MIN: CPT | Mod: S$GLB,,, | Performed by: INTERNAL MEDICINE

## 2025-07-22 PROCEDURE — 3044F HG A1C LEVEL LT 7.0%: CPT | Mod: CPTII,S$GLB,, | Performed by: INTERNAL MEDICINE

## 2025-07-22 PROCEDURE — 1159F MED LIST DOCD IN RCRD: CPT | Mod: CPTII,S$GLB,, | Performed by: INTERNAL MEDICINE

## 2025-07-22 PROCEDURE — 3079F DIAST BP 80-89 MM HG: CPT | Mod: CPTII,S$GLB,, | Performed by: INTERNAL MEDICINE

## 2025-07-22 RX ORDER — GABAPENTIN 100 MG/1
100 CAPSULE ORAL 3 TIMES DAILY
Qty: 90 CAPSULE | Refills: 11 | Status: SHIPPED | OUTPATIENT
Start: 2025-07-22 | End: 2026-07-22

## 2025-07-25 NOTE — PROGRESS NOTES
History of present illness:  65-year-old male with psoriasis, on Taltz.  He comes in for a several year history of numbness primarily in his hands.  He has no similar numbness in his feet.  He had carpal tunnel surgery 1 year ago which helped to some extent.  He had an EMG which showed both medial and ulnar compression neuropathy.  He also had evidence of a early demyelinating peripheral polyneuropathy.  The symptoms can occur at any time.  He has been taking Tylenol with some relief.  He denies any joint pain or swelling.      He has had no unexplained fevers.  He denies any headache, conjunctivitis, oral ulcers dryness of his eyes or mouth, Raynaud's phenomena, pleurisy, chronic or bloody diarrhea, urethral discharge or ulcers.  He has no thrombophlebitis.  He has no family history of arthritis or neurologic disease.      Systems review:  Weight has been stable.  He smokes a half a pack cigarettes daily.  He drinks 3-4 beers daily.  GI: No abdominal pain or peptic ulcer disease.  No liver problems.  : No kidney or bladder problems   Physical examination:   Skin:  No active psoriasis at this time   ENT:  Adequate tears in saliva.  No conjunctivitis or oral ulcers.    Chest: Clear to auscultation   Cardiac: No murmurs, gallops, rubs   Abdomen: No organomegaly or masses.  No tenderness to palpation   Extremities:  No pedal edema   Musculoskeletal:  He has osteoarthritic changes of the right D IP.  He has no synovitis.  He has no tenderness to palpation.  Neurologic: Normal Tinel sign   Laboratory: Normal sed rate and CRP.  Negative SEYMOUR, rheumatoid factor, ANCA.    Assessment:  1.  Peripheral neuropathy.  It may be related to his alcohol use  2.  Psoriasis.  He has no evidence of psoriatic arthritis     Plans:  1.  I placed him on gabapentin 100 mg 3 times daily.  I discussed his alcohol usage   2. If he does not improve, he may need to see Neurology   3.  Return in 6 months

## 2025-07-29 ENCOUNTER — CLINICAL SUPPORT (OUTPATIENT)
Dept: FAMILY MEDICINE | Facility: CLINIC | Age: 60
End: 2025-07-29
Payer: COMMERCIAL

## 2025-07-29 VITALS — DIASTOLIC BLOOD PRESSURE: 92 MMHG | SYSTOLIC BLOOD PRESSURE: 132 MMHG

## 2025-07-29 DIAGNOSIS — Z01.30 BP CHECK: Primary | ICD-10-CM

## 2025-07-29 PROCEDURE — 99999 PR PBB SHADOW E&M-EST. PATIENT-LVL II: CPT | Mod: PBBFAC,,,

## 2025-07-29 NOTE — PROGRESS NOTES
patient came to the clinic for a blood pressure check. He mentioned that he had taken his blood pressure medication in the morning. The patients blood pressure was measured four times. The first reading was 132/92. The patient was asked to rest for 10 minutes, after 10 minutes the blood pressure was checked again and the reading was 130/92. Then, the blood pressure was checked on the right arm, and the reading was 132/98. It was checked again on the left arm, and the reading was 130/92. The patient was confirmed to be feeling well. The patient said he felt completely fine and was not experiencing any symptoms such as headache or chest pain. The patient was advised to continue taking his medication and to keep monitoring his blood pressure.

## 2025-07-29 NOTE — Clinical Note
patient came to the clinic for a blood pressure check. He mentioned that he had taken his blood pressure medication in the morning. The patient's blood pressure was measured four times. The first reading was 132/92. The patient was asked to rest for 10 minutes, after 10 minutes the blood pressure was checked again and the reading was 130/92. Then, the blood pressure was checked on the right arm, and the reading was 132/98. It was checked again on the left arm, and the reading was 130/92. The patient was confirmed to be feeling well. The patient said he felt completely fine and was not experiencing any symptoms such as headache or chest pain. The patient was advised to continue taking his medication and to keep monitoring his blood pressure.

## 2025-08-07 ENCOUNTER — OFFICE VISIT (OUTPATIENT)
Dept: DERMATOLOGY | Facility: CLINIC | Age: 60
End: 2025-08-07
Payer: COMMERCIAL

## 2025-08-07 DIAGNOSIS — L72.0 MILIA: ICD-10-CM

## 2025-08-07 DIAGNOSIS — L40.9 PSORIASIS: Primary | ICD-10-CM

## 2025-08-07 PROCEDURE — 3044F HG A1C LEVEL LT 7.0%: CPT | Mod: CPTII,S$GLB,, | Performed by: DERMATOLOGY

## 2025-08-07 PROCEDURE — 1159F MED LIST DOCD IN RCRD: CPT | Mod: CPTII,S$GLB,, | Performed by: DERMATOLOGY

## 2025-08-07 PROCEDURE — 99213 OFFICE O/P EST LOW 20 MIN: CPT | Mod: S$GLB,,, | Performed by: DERMATOLOGY

## 2025-08-07 PROCEDURE — 4010F ACE/ARB THERAPY RXD/TAKEN: CPT | Mod: CPTII,S$GLB,, | Performed by: DERMATOLOGY

## 2025-08-07 PROCEDURE — 1160F RVW MEDS BY RX/DR IN RCRD: CPT | Mod: CPTII,S$GLB,, | Performed by: DERMATOLOGY

## 2025-08-07 PROCEDURE — 99999 PR PBB SHADOW E&M-EST. PATIENT-LVL III: CPT | Mod: PBBFAC,,, | Performed by: DERMATOLOGY

## 2025-08-07 NOTE — PROGRESS NOTES
Subjective:      Patient ID:  Lester Turner is a 59 y.o. male who presents for   Chief Complaint   Patient presents with    Psoriasis     F/u      Psoriasis - Follow-up - last seen 5/13/24    Taltz 80mg SQ q month - started 5/2019 (pt last injection 4 months ago) - has not flared    Psoriasis - Follow-up  Symptom course: resolved  Currently using: taltz 80mg SQ q month (pt last injection 4 motnhs ago), not using any topicals.  Affected locations: currently clear  Signs / symptoms: asymptomatic      Review of Systems   Constitutional:  Negative for fever, weight loss and weight gain.   HENT:  Negative for mouth sores (no tongue whiteness).    Respiratory:  Negative for cough and shortness of breath.         + smokes 1/2 ppd   Gastrointestinal:  Negative for nausea, vomiting, abdominal pain and diarrhea.   Musculoskeletal:  Positive for arthralgias (ankles and h/o gout).   Skin:  Positive for activity-related sunscreen use (only at beach). Negative for itching, rash, daily sunscreen use, recent sunburn and wears hat.        no ISR   Hematologic/Lymphatic: Does not bruise/bleed easily.       Objective:   Physical Exam   Constitutional: He appears well-developed and well-nourished. No distress.   Neurological: He is alert and oriented to person, place, and time. He is not disoriented.   Psychiatric: He has a normal mood and affect.   Skin:   Areas Examined (abnormalities noted in diagram):   Scalp / Hair Palpated and Inspected  Head / Face Inspection Performed  Neck Inspection Performed  Chest / Axilla Inspection Performed  Abdomen Inspection Performed  Genitals / Buttocks / Groin Inspection Performed  Back Inspection Performed  RUE Inspected  LUE Inspection Performed  RLE Inspected  LLE Inspection Performed  Nails and Digits Inspection Performed            Diagram Legend     Erythematous scaling macule/papule c/w actinic keratosis       Vascular papule c/w angioma      Pigmented verrucoid papule/plaque c/w seborrheic  keratosis      Yellow umbilicated papule c/w sebaceous hyperplasia      Irregularly shaped tan macule c/w lentigo     1-2 mm smooth white papules consistent with Milia      Movable subcutaneous cyst with punctum c/w epidermal inclusion cyst      Subcutaneous movable cyst c/w pilar cyst      Firm pink to brown papule c/w dermatofibroma      Pedunculated fleshy papule(s) c/w skin tag(s)      Evenly pigmented macule c/w junctional nevus     Mildly variegated pigmented, slightly irregular-bordered macule c/w mildly atypical nevus      Flesh colored to evenly pigmented papule c/w intradermal nevus       Pink pearly papule/plaque c/w basal cell carcinoma      Erythematous hyperkeratotic cursted plaque c/w SCC      Surgical scar with no sign of skin cancer recurrence      Open and closed comedones      Inflammatory papules and pustules      Verrucoid papule consistent consistent with wart     Erythematous eczematous patches and plaques     Dystrophic onycholytic nail with subungual debris c/w onychomycosis     Umbilicated papule    Erythematous-base heme-crusted tan verrucoid plaque consistent with inflamed seborrheic keratosis     Erythematous Silvery Scaling Plaque c/w Psoriasis     See annotation    Lab Results   Component Value Date    WBC 6.04 06/27/2025    HGB 14.3 06/27/2025    HCT 42.6 06/27/2025     (H) 06/27/2025     06/27/2025       Lab Results   Component Value Date    TBGOLDPLUS Positive (A) 02/28/2019 12/24 lung CT nl  Assessment / Plan:        Psoriasis    Milia - right ear   - minor problem and chronic.   Reassurance given to patient. No treatment necessary.         Psoriasis  Today's Plan:      Skin clear. Last Taltz March or April 2025. Will not restart Taltz at this time.   Pt to reach out via portal if his skin flares    F/u prn      No follow-ups on file.

## 2025-08-07 NOTE — ASSESSMENT & PLAN NOTE
Today's Plan:      Skin clear. Last Taltz March or April 2025. Will not restart Taltz at this time.   Pt to reach out via portal if his skin flares    F/u prn

## 2025-08-22 ENCOUNTER — TELEPHONE (OUTPATIENT)
Dept: ENDOSCOPY | Facility: HOSPITAL | Age: 60
End: 2025-08-22

## 2025-08-22 ENCOUNTER — CLINICAL SUPPORT (OUTPATIENT)
Dept: ENDOSCOPY | Facility: HOSPITAL | Age: 60
End: 2025-08-22
Attending: FAMILY MEDICINE
Payer: COMMERCIAL

## 2025-08-22 VITALS — BODY MASS INDEX: 34.75 KG/M2 | WEIGHT: 177 LBS | HEIGHT: 60 IN

## 2025-08-22 DIAGNOSIS — Z12.11 COLON CANCER SCREENING: ICD-10-CM

## 2025-08-22 DIAGNOSIS — Z12.11 ENCOUNTER FOR SCREENING COLONOSCOPY: Primary | ICD-10-CM

## 2025-08-22 DIAGNOSIS — Z86.0100 HX OF COLONIC POLYPS: Primary | ICD-10-CM

## 2025-08-22 RX ORDER — POLYETHYLENE GLYCOL 3350, SODIUM SULFATE ANHYDROUS, SODIUM BICARBONATE, SODIUM CHLORIDE, POTASSIUM CHLORIDE 236; 22.74; 6.74; 5.86; 2.97 G/4L; G/4L; G/4L; G/4L; G/4L
4 POWDER, FOR SOLUTION ORAL ONCE
Qty: 4000 ML | Refills: 0 | Status: SHIPPED | OUTPATIENT
Start: 2025-08-22 | End: 2025-08-22

## (undated) DEVICE — GAUZE SPONGE 4X4 12PLY

## (undated) DEVICE — SUT PROLENE 3-0 FS-2 18

## (undated) DEVICE — DRESSING N ADH OIL EMUL 3X3

## (undated) DEVICE — TOURNIQUET SB QC DP 18X4IN

## (undated) DEVICE — BNDG COFLEX FOAM LF2 ST 3X5YD

## (undated) DEVICE — GLOVE BIOGEL PI MICRO SZ 7

## (undated) DEVICE — NDL SAFETY 22G X 1.5 ECLIPSE

## (undated) DEVICE — SYR ONLY LUER LOCK 20CC

## (undated) DEVICE — NDL 18GA X1 1/2 REG BEVEL

## (undated) DEVICE — PAD CAST 2 IN X 4YDS STERILE

## (undated) DEVICE — SOL SALINE STER BOTTLE 500ML

## (undated) DEVICE — Device

## (undated) DEVICE — COVER CAMERA OPERATING ROOM

## (undated) DEVICE — KNIFE CARPAL TUNNEL

## (undated) DEVICE — STOCKINETTE DBL PLY ST 4X